# Patient Record
Sex: MALE | Race: WHITE | NOT HISPANIC OR LATINO | Employment: FULL TIME | URBAN - METROPOLITAN AREA
[De-identification: names, ages, dates, MRNs, and addresses within clinical notes are randomized per-mention and may not be internally consistent; named-entity substitution may affect disease eponyms.]

---

## 2023-11-19 ENCOUNTER — APPOINTMENT (OUTPATIENT)
Dept: ULTRASOUND IMAGING | Facility: HOSPITAL | Age: 43
DRG: 439 | End: 2023-11-19
Payer: COMMERCIAL

## 2023-11-19 ENCOUNTER — APPOINTMENT (OUTPATIENT)
Dept: CT IMAGING | Facility: HOSPITAL | Age: 43
DRG: 439 | End: 2023-11-19
Payer: COMMERCIAL

## 2023-11-19 ENCOUNTER — HOSPITAL ENCOUNTER (INPATIENT)
Facility: HOSPITAL | Age: 43
LOS: 5 days | Discharge: HOME | DRG: 439 | End: 2023-11-24
Attending: EMERGENCY MEDICINE | Admitting: INTERNAL MEDICINE
Payer: COMMERCIAL

## 2023-11-19 DIAGNOSIS — E87.20 LACTIC ACIDOSIS: ICD-10-CM

## 2023-11-19 DIAGNOSIS — K85.90 ACUTE PANCREATITIS, UNSPECIFIED COMPLICATION STATUS, UNSPECIFIED PANCREATITIS TYPE: Primary | ICD-10-CM

## 2023-11-19 DIAGNOSIS — N17.9 ACUTE KIDNEY INJURY: ICD-10-CM

## 2023-11-19 LAB
ALBUMIN SERPL-MCNC: 6.9 G/DL (ref 3.5–5.2)
ALBUMIN/GLOB SERPL: 2.1 G/DL
ALP SERPL-CCNC: 591 U/L (ref 39–117)
ALT SERPL W P-5'-P-CCNC: 591 U/L (ref 1–41)
AMPHET+METHAMPHET UR QL: NEGATIVE
ANION GAP SERPL CALCULATED.3IONS-SCNC: 23 MMOL/L (ref 5–15)
AST SERPL-CCNC: 198 U/L (ref 1–40)
BACTERIA UR QL AUTO: NORMAL /HPF
BARBITURATES UR QL SCN: NEGATIVE
BENZODIAZ UR QL SCN: NEGATIVE
BILIRUB SERPL-MCNC: 2.1 MG/DL (ref 0–1.2)
BILIRUB UR QL STRIP: NEGATIVE
BUN SERPL-MCNC: 21 MG/DL (ref 6–20)
BUN/CREAT SERPL: 8.3 (ref 7–25)
CALCIUM SPEC-SCNC: 12.4 MG/DL (ref 8.6–10.5)
CANNABINOIDS SERPL QL: NEGATIVE
CHLORIDE SERPL-SCNC: 97 MMOL/L (ref 98–107)
CLARITY UR: CLEAR
CO2 SERPL-SCNC: 24 MMOL/L (ref 22–29)
COCAINE UR QL: POSITIVE
COLOR UR: YELLOW
CREAT SERPL-MCNC: 2.53 MG/DL (ref 0.76–1.27)
D-LACTATE SERPL-SCNC: 1.4 MMOL/L (ref 0.5–2)
D-LACTATE SERPL-SCNC: 5.2 MMOL/L (ref 0.3–2)
DEPRECATED RDW RBC AUTO: 41.6 FL (ref 37–54)
EGFRCR SERPLBLD CKD-EPI 2021: 31.6 ML/MIN/1.73
ERYTHROCYTE [DISTWIDTH] IN BLOOD BY AUTOMATED COUNT: 13.4 % (ref 12.3–15.4)
ETHANOL UR QL: <0.01 %
GLOBULIN UR ELPH-MCNC: 3.3 GM/DL
GLUCOSE SERPL-MCNC: 192 MG/DL (ref 65–99)
GLUCOSE UR STRIP-MCNC: NEGATIVE MG/DL
HCT VFR BLD AUTO: 60.7 % (ref 37.5–51)
HGB BLD-MCNC: 20.4 G/DL (ref 13–17.7)
HGB UR QL STRIP.AUTO: NEGATIVE
HYALINE CASTS UR QL AUTO: NORMAL /LPF
KETONES UR QL STRIP: ABNORMAL
LARGE PLATELETS: ABNORMAL
LEUKOCYTE ESTERASE UR QL STRIP.AUTO: NEGATIVE
LIPASE SERPL-CCNC: 1786 U/L (ref 13–60)
LYMPHOCYTES # BLD MANUAL: 0.64 10*3/MM3 (ref 0.7–3.1)
LYMPHOCYTES NFR BLD MANUAL: 5 % (ref 5–12)
MCH RBC QN AUTO: 28.1 PG (ref 26.6–33)
MCHC RBC AUTO-ENTMCNC: 33.6 G/DL (ref 31.5–35.7)
MCV RBC AUTO: 83.7 FL (ref 79–97)
METHADONE UR QL SCN: NEGATIVE
MONOCYTES # BLD: 1.07 10*3/MM3 (ref 0.1–0.9)
NEUTROPHILS # BLD AUTO: 19.69 10*3/MM3 (ref 1.7–7)
NEUTROPHILS NFR BLD MANUAL: 83 % (ref 42.7–76)
NEUTS BAND NFR BLD MANUAL: 9 % (ref 0–5)
NEUTS VAC BLD QL SMEAR: ABNORMAL
NITRITE UR QL STRIP: NEGATIVE
OPIATES UR QL: POSITIVE
OXYCODONE UR QL SCN: NEGATIVE
PH UR STRIP.AUTO: <=5 [PH] (ref 5–8)
PLATELET # BLD AUTO: 415 10*3/MM3 (ref 140–450)
PMV BLD AUTO: 9.4 FL (ref 6–12)
POTASSIUM SERPL-SCNC: 4.3 MMOL/L (ref 3.5–5.2)
PROT SERPL-MCNC: 10.2 G/DL (ref 6–8.5)
PROT UR QL STRIP: ABNORMAL
RBC # BLD AUTO: 7.25 10*6/MM3 (ref 4.14–5.8)
RBC # UR STRIP: NORMAL /HPF
RBC MORPH BLD: NORMAL
REF LAB TEST METHOD: NORMAL
SCAN SLIDE: NORMAL
SODIUM SERPL-SCNC: 144 MMOL/L (ref 136–145)
SP GR UR STRIP: 1.07 (ref 1–1.03)
SQUAMOUS #/AREA URNS HPF: NORMAL /HPF
UROBILINOGEN UR QL STRIP: ABNORMAL
VARIANT LYMPHS NFR BLD MANUAL: 3 % (ref 19.6–45.3)
WBC # UR STRIP: NORMAL /HPF
WBC NRBC COR # BLD AUTO: 21.4 10*3/MM3 (ref 3.4–10.8)

## 2023-11-19 PROCEDURE — 74177 CT ABD & PELVIS W/CONTRAST: CPT

## 2023-11-19 PROCEDURE — 85025 COMPLETE CBC W/AUTO DIFF WBC: CPT | Performed by: EMERGENCY MEDICINE

## 2023-11-19 PROCEDURE — 83605 ASSAY OF LACTIC ACID: CPT

## 2023-11-19 PROCEDURE — 36415 COLL VENOUS BLD VENIPUNCTURE: CPT

## 2023-11-19 PROCEDURE — 83690 ASSAY OF LIPASE: CPT | Performed by: EMERGENCY MEDICINE

## 2023-11-19 PROCEDURE — 80307 DRUG TEST PRSMV CHEM ANLYZR: CPT | Performed by: EMERGENCY MEDICINE

## 2023-11-19 PROCEDURE — 25810000003 LACTATED RINGERS SOLUTION: Performed by: EMERGENCY MEDICINE

## 2023-11-19 PROCEDURE — 99285 EMERGENCY DEPT VISIT HI MDM: CPT

## 2023-11-19 PROCEDURE — 80053 COMPREHEN METABOLIC PANEL: CPT | Performed by: EMERGENCY MEDICINE

## 2023-11-19 PROCEDURE — 76705 ECHO EXAM OF ABDOMEN: CPT

## 2023-11-19 PROCEDURE — 25010000002 MORPHINE PER 10 MG: Performed by: EMERGENCY MEDICINE

## 2023-11-19 PROCEDURE — 25010000002 HYDROMORPHONE 1 MG/ML SOLUTION: Performed by: INTERNAL MEDICINE

## 2023-11-19 PROCEDURE — 25810000003 SODIUM CHLORIDE 0.9 % SOLUTION: Performed by: EMERGENCY MEDICINE

## 2023-11-19 PROCEDURE — 25510000001 IOPAMIDOL PER 1 ML: Performed by: EMERGENCY MEDICINE

## 2023-11-19 PROCEDURE — 82077 ASSAY SPEC XCP UR&BREATH IA: CPT | Performed by: EMERGENCY MEDICINE

## 2023-11-19 PROCEDURE — 81001 URINALYSIS AUTO W/SCOPE: CPT | Performed by: EMERGENCY MEDICINE

## 2023-11-19 PROCEDURE — 25010000002 ENOXAPARIN PER 10 MG: Performed by: INTERNAL MEDICINE

## 2023-11-19 PROCEDURE — 25810000003 SODIUM CHLORIDE 0.9 % SOLUTION: Performed by: INTERNAL MEDICINE

## 2023-11-19 PROCEDURE — 25810000003 LACTATED RINGERS PER 1000 ML: Performed by: INTERNAL MEDICINE

## 2023-11-19 PROCEDURE — 25010000002 PIPERACILLIN SOD-TAZOBACTAM PER 1 G: Performed by: EMERGENCY MEDICINE

## 2023-11-19 PROCEDURE — 25010000002 ONDANSETRON PER 1 MG: Performed by: EMERGENCY MEDICINE

## 2023-11-19 PROCEDURE — 25010000002 HYDROMORPHONE 1 MG/ML SOLUTION: Performed by: EMERGENCY MEDICINE

## 2023-11-19 PROCEDURE — 85007 BL SMEAR W/DIFF WBC COUNT: CPT | Performed by: EMERGENCY MEDICINE

## 2023-11-19 PROCEDURE — 87040 BLOOD CULTURE FOR BACTERIA: CPT | Performed by: EMERGENCY MEDICINE

## 2023-11-19 PROCEDURE — 25010000002 ONDANSETRON PER 1 MG: Performed by: INTERNAL MEDICINE

## 2023-11-19 RX ORDER — ACETAMINOPHEN 650 MG/1
650 SUPPOSITORY RECTAL EVERY 4 HOURS PRN
Status: DISCONTINUED | OUTPATIENT
Start: 2023-11-19 | End: 2023-11-24 | Stop reason: HOSPADM

## 2023-11-19 RX ORDER — POLYETHYLENE GLYCOL 3350 17 G/17G
17 POWDER, FOR SOLUTION ORAL DAILY PRN
Status: DISCONTINUED | OUTPATIENT
Start: 2023-11-19 | End: 2023-11-24 | Stop reason: HOSPADM

## 2023-11-19 RX ORDER — ONDANSETRON 2 MG/ML
4 INJECTION INTRAMUSCULAR; INTRAVENOUS ONCE
Status: COMPLETED | OUTPATIENT
Start: 2023-11-19 | End: 2023-11-19

## 2023-11-19 RX ORDER — ONDANSETRON 4 MG/1
4 TABLET, FILM COATED ORAL EVERY 6 HOURS PRN
Status: DISCONTINUED | OUTPATIENT
Start: 2023-11-19 | End: 2023-11-24 | Stop reason: HOSPADM

## 2023-11-19 RX ORDER — PANTOPRAZOLE SODIUM 40 MG/10ML
40 INJECTION, POWDER, LYOPHILIZED, FOR SOLUTION INTRAVENOUS
Status: DISCONTINUED | OUTPATIENT
Start: 2023-11-19 | End: 2023-11-22

## 2023-11-19 RX ORDER — BISACODYL 5 MG/1
5 TABLET, DELAYED RELEASE ORAL DAILY PRN
Status: DISCONTINUED | OUTPATIENT
Start: 2023-11-19 | End: 2023-11-24 | Stop reason: HOSPADM

## 2023-11-19 RX ORDER — SODIUM CHLORIDE 9 MG/ML
100 INJECTION, SOLUTION INTRAVENOUS CONTINUOUS
Status: DISCONTINUED | OUTPATIENT
Start: 2023-11-19 | End: 2023-11-19

## 2023-11-19 RX ORDER — ACETAMINOPHEN 325 MG/1
650 TABLET ORAL EVERY 4 HOURS PRN
Status: DISCONTINUED | OUTPATIENT
Start: 2023-11-19 | End: 2023-11-24 | Stop reason: HOSPADM

## 2023-11-19 RX ORDER — AMOXICILLIN 250 MG
2 CAPSULE ORAL 2 TIMES DAILY
Status: DISCONTINUED | OUTPATIENT
Start: 2023-11-19 | End: 2023-11-24 | Stop reason: HOSPADM

## 2023-11-19 RX ORDER — SODIUM CHLORIDE, SODIUM LACTATE, POTASSIUM CHLORIDE, CALCIUM CHLORIDE 600; 310; 30; 20 MG/100ML; MG/100ML; MG/100ML; MG/100ML
100 INJECTION, SOLUTION INTRAVENOUS CONTINUOUS
Status: DISCONTINUED | OUTPATIENT
Start: 2023-11-19 | End: 2023-11-24 | Stop reason: HOSPADM

## 2023-11-19 RX ORDER — SODIUM CHLORIDE 0.9 % (FLUSH) 0.9 %
10 SYRINGE (ML) INJECTION AS NEEDED
Status: DISCONTINUED | OUTPATIENT
Start: 2023-11-19 | End: 2023-11-24 | Stop reason: HOSPADM

## 2023-11-19 RX ORDER — ALUMINA, MAGNESIA, AND SIMETHICONE 2400; 2400; 240 MG/30ML; MG/30ML; MG/30ML
15 SUSPENSION ORAL EVERY 6 HOURS PRN
Status: DISCONTINUED | OUTPATIENT
Start: 2023-11-19 | End: 2023-11-24 | Stop reason: HOSPADM

## 2023-11-19 RX ORDER — ACETAMINOPHEN 160 MG/5ML
650 SOLUTION ORAL EVERY 4 HOURS PRN
Status: DISCONTINUED | OUTPATIENT
Start: 2023-11-19 | End: 2023-11-24 | Stop reason: HOSPADM

## 2023-11-19 RX ORDER — CHOLECALCIFEROL (VITAMIN D3) 125 MCG
5 CAPSULE ORAL NIGHTLY PRN
Status: DISCONTINUED | OUTPATIENT
Start: 2023-11-19 | End: 2023-11-24 | Stop reason: HOSPADM

## 2023-11-19 RX ORDER — CHLORPROMAZINE HYDROCHLORIDE 25 MG/ML
25 INJECTION INTRAMUSCULAR ONCE
Status: COMPLETED | OUTPATIENT
Start: 2023-11-19 | End: 2023-11-20

## 2023-11-19 RX ORDER — OXYCODONE HYDROCHLORIDE 5 MG/1
10 TABLET ORAL EVERY 4 HOURS PRN
Status: DISCONTINUED | OUTPATIENT
Start: 2023-11-19 | End: 2023-11-24 | Stop reason: HOSPADM

## 2023-11-19 RX ORDER — ONDANSETRON 2 MG/ML
4 INJECTION INTRAMUSCULAR; INTRAVENOUS EVERY 6 HOURS PRN
Status: DISCONTINUED | OUTPATIENT
Start: 2023-11-19 | End: 2023-11-24 | Stop reason: HOSPADM

## 2023-11-19 RX ORDER — ENOXAPARIN SODIUM 100 MG/ML
40 INJECTION SUBCUTANEOUS DAILY
Status: DISCONTINUED | OUTPATIENT
Start: 2023-11-19 | End: 2023-11-24 | Stop reason: HOSPADM

## 2023-11-19 RX ORDER — BISACODYL 10 MG
10 SUPPOSITORY, RECTAL RECTAL DAILY PRN
Status: DISCONTINUED | OUTPATIENT
Start: 2023-11-19 | End: 2023-11-24 | Stop reason: HOSPADM

## 2023-11-19 RX ADMIN — HYDROMORPHONE HYDROCHLORIDE 1 MG: 1 INJECTION, SOLUTION INTRAMUSCULAR; INTRAVENOUS; SUBCUTANEOUS at 15:55

## 2023-11-19 RX ADMIN — PIPERACILLIN AND TAZOBACTAM 3.38 G: 3; .375 INJECTION, POWDER, FOR SOLUTION INTRAVENOUS at 10:34

## 2023-11-19 RX ADMIN — MORPHINE SULFATE 4 MG: 4 INJECTION, SOLUTION INTRAMUSCULAR; INTRAVENOUS at 08:47

## 2023-11-19 RX ADMIN — SODIUM CHLORIDE 100 ML/HR: 9 INJECTION, SOLUTION INTRAVENOUS at 15:48

## 2023-11-19 RX ADMIN — HYDROMORPHONE HYDROCHLORIDE 1 MG: 1 INJECTION, SOLUTION INTRAMUSCULAR; INTRAVENOUS; SUBCUTANEOUS at 20:29

## 2023-11-19 RX ADMIN — SODIUM CHLORIDE 2040 ML: 9 INJECTION, SOLUTION INTRAVENOUS at 09:31

## 2023-11-19 RX ADMIN — ONDANSETRON 4 MG: 2 INJECTION INTRAMUSCULAR; INTRAVENOUS at 08:47

## 2023-11-19 RX ADMIN — PANTOPRAZOLE SODIUM 40 MG: 40 INJECTION, POWDER, FOR SOLUTION INTRAVENOUS at 18:39

## 2023-11-19 RX ADMIN — SODIUM CHLORIDE, POTASSIUM CHLORIDE, SODIUM LACTATE AND CALCIUM CHLORIDE 200 ML/HR: 600; 310; 30; 20 INJECTION, SOLUTION INTRAVENOUS at 21:21

## 2023-11-19 RX ADMIN — HYDROMORPHONE HYDROCHLORIDE 1 MG: 1 INJECTION, SOLUTION INTRAMUSCULAR; INTRAVENOUS; SUBCUTANEOUS at 12:36

## 2023-11-19 RX ADMIN — ACETAMINOPHEN 650 MG: 325 TABLET, FILM COATED ORAL at 20:29

## 2023-11-19 RX ADMIN — SODIUM CHLORIDE, POTASSIUM CHLORIDE, SODIUM LACTATE AND CALCIUM CHLORIDE 1000 ML: 600; 310; 30; 20 INJECTION, SOLUTION INTRAVENOUS at 08:47

## 2023-11-19 RX ADMIN — IOPAMIDOL 100 ML: 755 INJECTION, SOLUTION INTRAVENOUS at 09:43

## 2023-11-19 RX ADMIN — ENOXAPARIN SODIUM 40 MG: 100 INJECTION SUBCUTANEOUS at 18:39

## 2023-11-19 RX ADMIN — ONDANSETRON 4 MG: 2 INJECTION INTRAMUSCULAR; INTRAVENOUS at 15:48

## 2023-11-19 RX ADMIN — HYDROMORPHONE HYDROCHLORIDE 1 MG: 1 INJECTION, SOLUTION INTRAMUSCULAR; INTRAVENOUS; SUBCUTANEOUS at 09:53

## 2023-11-19 NOTE — ED PROVIDER NOTES
"Subjective   History of Present Illness  43-year-old male describes severe epigastric pain and frequent vomiting since last night.  He states he has had this problem on and off for a couple of months.  He reports no unusual ingestions and denies alcohol use.  He states he had felt feverish.  He reports no diarrhea or constipation or melena.  He reports no relieving or exacerbating factors.  He states he had not taken any medications over-the-counter or prescription.  Review of Systems    No past medical history on file.   reports no past medical history  No Known Allergies    No past surgical history on file.    No family history on file.    Social History     Socioeconomic History    Marital status: Single     Prior to Admission medications    Not on File     /87   Pulse 90   Temp 97.7 °F (36.5 °C) (Axillary)   Resp 28   Ht 182.9 cm (72\")   Wt 68 kg (150 lb)   SpO2 96%   BMI 20.34 kg/m²         Objective   Physical Exam  General: Thin male, acute distress secondary to pain, awake and alert  Eyes: sclera nonicteric  HEENT: Mucous membranes moist, no mucosal swelling  Neck: Supple, no nuchal rigidity, no JVD  Respirations: Respirations nonlabored, equal breath sounds bilaterally, clear lungs  Heart regular rate and rhythm, no murmurs rubs or gallops,   Abdomen soft, tender palpation epigastrium, no rebound or guarding, nondistended, no hepatosplenomegaly, no hernia, no mass, normal bowel sounds, no CVA tenderness  Extremities no clubbing cyanosis or edema,   Neuro cranial nerves grossly intact, no focal limb deficits  Psych oriented, pleasant affect  Skin no rash, brisk cap refill  Procedures           ED Course      Results for orders placed or performed during the hospital encounter of 11/19/23   Comprehensive Metabolic Panel    Specimen: Blood   Result Value Ref Range    Glucose 192 (H) 65 - 99 mg/dL    BUN 21 (H) 6 - 20 mg/dL    Creatinine 2.53 (H) 0.76 - 1.27 mg/dL    Sodium 144 136 - 145 mmol/L    " Potassium 4.3 3.5 - 5.2 mmol/L    Chloride 97 (L) 98 - 107 mmol/L    CO2 24.0 22.0 - 29.0 mmol/L    Calcium 12.4 (H) 8.6 - 10.5 mg/dL    Total Protein 10.2 (H) 6.0 - 8.5 g/dL    Albumin 6.9 (H) 3.5 - 5.2 g/dL    ALT (SGPT) 591 (H) 1 - 41 U/L    AST (SGOT) 198 (H) 1 - 40 U/L    Alkaline Phosphatase 591 (H) 39 - 117 U/L    Total Bilirubin 2.1 (H) 0.0 - 1.2 mg/dL    Globulin 3.3 gm/dL    A/G Ratio 2.1 g/dL    BUN/Creatinine Ratio 8.3 7.0 - 25.0    Anion Gap 23.0 (H) 5.0 - 15.0 mmol/L    eGFR 31.6 (L) >60.0 mL/min/1.73   Lipase    Specimen: Blood   Result Value Ref Range    Lipase 1,786 (H) 13 - 60 U/L   CBC Auto Differential    Specimen: Blood   Result Value Ref Range    WBC 21.40 (H) 3.40 - 10.80 10*3/mm3    RBC 7.25 (H) 4.14 - 5.80 10*6/mm3    Hemoglobin 20.4 (H) 13.0 - 17.7 g/dL    Hematocrit 60.7 (H) 37.5 - 51.0 %    MCV 83.7 79.0 - 97.0 fL    MCH 28.1 26.6 - 33.0 pg    MCHC 33.6 31.5 - 35.7 g/dL    RDW 13.4 12.3 - 15.4 %    RDW-SD 41.6 37.0 - 54.0 fl    MPV 9.4 6.0 - 12.0 fL    Platelets 415 140 - 450 10*3/mm3   Scan Slide    Specimen: Blood   Result Value Ref Range    Scan Slide     Manual Differential    Specimen: Blood   Result Value Ref Range    Neutrophil % 83.0 (H) 42.7 - 76.0 %    Lymphocyte % 3.0 (L) 19.6 - 45.3 %    Monocyte % 5.0 5.0 - 12.0 %    Bands %  9.0 (H) 0.0 - 5.0 %    Neutrophils Absolute 19.69 (H) 1.70 - 7.00 10*3/mm3    Lymphocytes Absolute 0.64 (L) 0.70 - 3.10 10*3/mm3    Monocytes Absolute 1.07 (H) 0.10 - 0.90 10*3/mm3    RBC Morphology Normal Normal    Vacuolated Neutrophils Slight/1+ None Seen    Large Platelets Slight/1+ None Seen   POC Lactate    Specimen: Blood   Result Value Ref Range    Lactate 5.2 (C) 0.3 - 2.0 mmol/L     CT Abdomen Pelvis With Contrast    Result Date: 11/19/2023  Impression: Acute uncomplicated pancreatitis. Electronically Signed: Aristeo Byers MD  11/19/2023 10:00 AM EST  Workstation ID: [id number]                                        Medical Decision  Making  Patient presented with upper abdominal pain differential diagnosis including perforation, peritonitis, pancreatitis, cholecystitis, bowel obstruction, ischemic bowel    Patient was ordered IV fluids 30 cc/kg as he does have some lactic acidosis.  Notably he is afebrile.  Blood cultures ordered and pending.  He was ordered IV Zosyn.  CT scan showing pancreatitis.  Laboratory evaluation showing elevated lipase as well as some acute kidney injury and CBC suggestive of hemoconcentration likely secondary to his dehydration.  Patient was ordered morphine and then Dilaudid for acute pain management.  He had some marginal improvement in discomfort.  He was advised of findings and agreeable plan of admission for further treatment and evaluation.  Hospitalist service was paged for admission.  Case and findings discussed with Dr. Gutierrez with the hospitalist service for admission    Problems Addressed:  Acute kidney injury: complicated acute illness or injury  Acute pancreatitis, unspecified complication status, unspecified pancreatitis type: complicated acute illness or injury  Lactic acidosis: complicated acute illness or injury    Amount and/or Complexity of Data Reviewed  Labs: ordered. Decision-making details documented in ED Course.  Radiology: ordered and independent interpretation performed.     Details: My independent interpretation of CT abdomen image inflammatory changes around the pancreas, no apparent bowel obstruction or free air    Risk  Prescription drug management.  Decision regarding hospitalization.        Final diagnoses:   Acute pancreatitis, unspecified complication status, unspecified pancreatitis type   Acute kidney injury   Lactic acidosis       ED Disposition  ED Disposition       ED Disposition   Decision to Admit    Condition   --    Comment   Level of Care: Progressive Care [20]   Admitting Physician: MONAE FERRER [[id number]]   Attending Physician: MONAE FERRER [[id number]]                 No  follow-up provider specified.       Medication List      No changes were made to your prescriptions during this visit.            Murali Belle MD  11/19/23 1031       Murali Belle MD  11/19/23 3821

## 2023-11-19 NOTE — H&P
[facility] Medicine Services  History & Physical    Patient Name: Dayron Manley  : 1980  MRN: [id number]  Primary Care Physician:  Provider, No Known  Date of admission: 2023  Date of Service: 23    Subjective      Chief Complaint: Abdominal pain    History of Present Illness: Dayron Manley is a 43 y.o. male who presented to [facility] on 2023 complaining of abdominal pain nausea and vomiting.  Patient gives history of abdominal pain starting yesterday at 6 PM.  Moderate to severe abdominal pain.  Pain in the epigastric region.  Associated with nausea and vomiting.  He was unable to keep food.  No diarrhea.  No rectal bleeding.  No melena.  No hematemesis.  Patient drinks alcohol however his last drink was approximately 2 weeks back.  Patient is history abdominal pain for many months.  No dysuria.  No flank pain.  Patient denies any significant past medical history.  He does not see a primary care physician on a regular basis.  Patient called his mother who brought him to [facility] emergency room.  Currently patient has epigastric pain.  No anterior chest pain.  No shortness of breath.  No dyspnea on exertion.  No weakness or numbness of the limbs.      ROS A 12 point review of system was done and was negative except as mentioned above    Personal History     No past medical history on file.    No past surgical history on file.    Family History: family history is not on file. Otherwise pertinent FHx was reviewed and not pertinent to current issue.    Social History:      Home Medications:  Prior to Admission Medications       None              Allergies:  No Known Allergies    Objective      Vitals:   Temp:  [97.7 °F (36.5 °C)] 97.7 °F (36.5 °C)  Heart Rate:  [] 73  Resp:  [28] 28  BP: (125-162)/() 161/83    Physical Exam  Constitutional:       Appearance: Normal appearance.   HENT:      Head: Normocephalic and atraumatic.      Nose: Nose  normal.   Cardiovascular:      Rate and Rhythm: Normal rate.      Heart sounds: Normal heart sounds.   Pulmonary:      Effort: Pulmonary effort is normal. No respiratory distress.      Breath sounds: Normal breath sounds. No stridor. No wheezing, rhonchi or rales.   Chest:      Chest wall: No tenderness.   Abdominal:      General: Abdomen is flat. There is no distension.      Palpations: There is no mass.      Tenderness: There is abdominal tenderness. There is no right CVA tenderness, left CVA tenderness, guarding or rebound.      Comments: Epigastrium and is present.  No rebound or guarding bowel sounds are present.   Musculoskeletal:      Cervical back: Normal range of motion.   Skin:     General: Skin is warm and dry.   Neurological:      General: No focal deficit present.      Mental Status: He is alert.   Psychiatric:         Mood and Affect: Mood normal.         Behavior: Behavior normal.          Result Review    Result Review:  I have personally reviewed the results from the time of this admission to 11/19/2023 13:10 EST and agree with these findings:  [x]  Laboratory  []  Microbiology  [x]  Radiology  []  EKG/Telemetry   []  Cardiology/Vascular   []  Pathology  []  Old records  []  Other:        Assessment & Plan        Active Hospital Problems:  There are no active hospital problems to display for this patient.    Plan:   Abdominal pain  Nausea and vomiting  Acute pancreatitis  Acute kidney injury  Dehydration  Erythrocytosis.    Is a 41-year-old male with no significant past medical history came with abdominal pain.  He gets intermittent episodes of abdominal pain for the last few months.  Abdominal pain became worse yesterday evening.  Was found to have elevated lipase.  CT scan report was reviewed which showed acute uncomplicated pancreatitis.  We will follow-up his electrolytes.  He has elevated creatinine.  We will continue IV fluids.  Zofran as needed.  IV analgesics.  GI to see.  We will do an  ultrasound of the gallbladder.  Abstinence from alcohol.  Patient has elevated hemoglobin.  Questionable secondary to hemoconcentration.  IV fluids.  Follow-up CBC.  Follow clinically.      CODE STATUS: Full code     Admission Status:  I believe this patient meets inpatient status    I discussed the patient's findings and my recommendations with the patient and mother.        Signature: Electronically signed by Dianne Yoo MD, 11/19/23, 13:10 EST.  [facility] Hospitalist Team

## 2023-11-19 NOTE — Clinical Note
Level of Care: Progressive Care [20]   Admitting Physician: MONAE FERRER [[id number]]   Attending Physician: MONAE FERRER [[id number]]

## 2023-11-20 ENCOUNTER — INPATIENT HOSPITAL (OUTPATIENT)
Dept: URBAN - METROPOLITAN AREA HOSPITAL 84 | Facility: HOSPITAL | Age: 43
End: 2023-11-20

## 2023-11-20 DIAGNOSIS — F10.20 ALCOHOL DEPENDENCE, UNCOMPLICATED: ICD-10-CM

## 2023-11-20 DIAGNOSIS — R89.2: ICD-10-CM

## 2023-11-20 DIAGNOSIS — K85.90 ACUTE PANCREATITIS WITHOUT NECROSIS OR INFECTION, UNSPECIFIE: ICD-10-CM

## 2023-11-20 DIAGNOSIS — R94.5 ABNORMAL RESULTS OF LIVER FUNCTION STUDIES: ICD-10-CM

## 2023-11-20 LAB
ALBUMIN SERPL-MCNC: 3.9 G/DL (ref 3.5–5.2)
ALBUMIN/GLOB SERPL: 1.6 G/DL
ALP SERPL-CCNC: 302 U/L (ref 39–117)
ALT SERPL W P-5'-P-CCNC: 268 U/L (ref 1–41)
ANION GAP SERPL CALCULATED.3IONS-SCNC: 11 MMOL/L (ref 5–15)
AST SERPL-CCNC: 72 U/L (ref 1–40)
BASOPHILS # BLD AUTO: 0.1 10*3/MM3 (ref 0–0.2)
BASOPHILS NFR BLD AUTO: 0.3 % (ref 0–1.5)
BILIRUB SERPL-MCNC: 1.3 MG/DL (ref 0–1.2)
BUN SERPL-MCNC: 18 MG/DL (ref 6–20)
BUN/CREAT SERPL: 18.2 (ref 7–25)
CALCIUM SPEC-SCNC: 9.4 MG/DL (ref 8.6–10.5)
CHLORIDE SERPL-SCNC: 109 MMOL/L (ref 98–107)
CHOLEST SERPL-MCNC: 137 MG/DL (ref 0–200)
CO2 SERPL-SCNC: 24 MMOL/L (ref 22–29)
CREAT SERPL-MCNC: 0.99 MG/DL (ref 0.76–1.27)
DEPRECATED RDW RBC AUTO: 41.6 FL (ref 37–54)
EGFRCR SERPLBLD CKD-EPI 2021: 97.5 ML/MIN/1.73
EOSINOPHIL # BLD AUTO: 0 10*3/MM3 (ref 0–0.4)
EOSINOPHIL NFR BLD AUTO: 0 % (ref 0.3–6.2)
ERYTHROCYTE [DISTWIDTH] IN BLOOD BY AUTOMATED COUNT: 13.6 % (ref 12.3–15.4)
GLOBULIN UR ELPH-MCNC: 2.4 GM/DL
GLUCOSE SERPL-MCNC: 112 MG/DL (ref 65–99)
HAV IGM SERPL QL IA: NORMAL
HBV CORE IGM SERPL QL IA: NORMAL
HBV SURFACE AG SERPL QL IA: NORMAL
HCT VFR BLD AUTO: 48.2 % (ref 37.5–51)
HCV AB SER DONR QL: NORMAL
HDLC SERPL-MCNC: 42 MG/DL (ref 40–60)
HGB BLD-MCNC: 15.5 G/DL (ref 13–17.7)
LDLC SERPL CALC-MCNC: 75 MG/DL (ref 0–100)
LDLC/HDLC SERPL: 1.73 {RATIO}
LIPASE SERPL-CCNC: 821 U/L (ref 13–60)
LYMPHOCYTES # BLD AUTO: 1 10*3/MM3 (ref 0.7–3.1)
LYMPHOCYTES NFR BLD AUTO: 5.6 % (ref 19.6–45.3)
MCH RBC QN AUTO: 27.9 PG (ref 26.6–33)
MCHC RBC AUTO-ENTMCNC: 32.2 G/DL (ref 31.5–35.7)
MCV RBC AUTO: 86.7 FL (ref 79–97)
MONOCYTES # BLD AUTO: 1 10*3/MM3 (ref 0.1–0.9)
MONOCYTES NFR BLD AUTO: 5.7 % (ref 5–12)
NEUTROPHILS NFR BLD AUTO: 15.4 10*3/MM3 (ref 1.7–7)
NEUTROPHILS NFR BLD AUTO: 88.4 % (ref 42.7–76)
NRBC BLD AUTO-RTO: 0 /100 WBC (ref 0–0.2)
PLATELET # BLD AUTO: 257 10*3/MM3 (ref 140–450)
PMV BLD AUTO: 9.1 FL (ref 6–12)
POTASSIUM SERPL-SCNC: 4.2 MMOL/L (ref 3.5–5.2)
PROT SERPL-MCNC: 6.3 G/DL (ref 6–8.5)
RBC # BLD AUTO: 5.57 10*6/MM3 (ref 4.14–5.8)
SODIUM SERPL-SCNC: 144 MMOL/L (ref 136–145)
TRIGL SERPL-MCNC: 111 MG/DL (ref 0–150)
VLDLC SERPL-MCNC: 20 MG/DL (ref 5–40)
WBC NRBC COR # BLD AUTO: 17.4 10*3/MM3 (ref 3.4–10.8)

## 2023-11-20 PROCEDURE — 86140 C-REACTIVE PROTEIN: CPT | Performed by: NURSE PRACTITIONER

## 2023-11-20 PROCEDURE — 80053 COMPREHEN METABOLIC PANEL: CPT | Performed by: INTERNAL MEDICINE

## 2023-11-20 PROCEDURE — 25810000003 LACTATED RINGERS PER 1000 ML: Performed by: INTERNAL MEDICINE

## 2023-11-20 PROCEDURE — 25010000002 ONDANSETRON PER 1 MG: Performed by: INTERNAL MEDICINE

## 2023-11-20 PROCEDURE — 83690 ASSAY OF LIPASE: CPT | Performed by: NURSE PRACTITIONER

## 2023-11-20 PROCEDURE — 85025 COMPLETE CBC W/AUTO DIFF WBC: CPT | Performed by: NURSE PRACTITIONER

## 2023-11-20 PROCEDURE — 83690 ASSAY OF LIPASE: CPT | Performed by: INTERNAL MEDICINE

## 2023-11-20 PROCEDURE — 80053 COMPREHEN METABOLIC PANEL: CPT | Performed by: NURSE PRACTITIONER

## 2023-11-20 PROCEDURE — 25010000002 CHLORPROMAZINE PER 50 MG: Performed by: HOSPITALIST

## 2023-11-20 PROCEDURE — 25010000002 HYDROMORPHONE 1 MG/ML SOLUTION: Performed by: INTERNAL MEDICINE

## 2023-11-20 PROCEDURE — 99222 1ST HOSP IP/OBS MODERATE 55: CPT | Performed by: NURSE PRACTITIONER

## 2023-11-20 PROCEDURE — 36415 COLL VENOUS BLD VENIPUNCTURE: CPT | Performed by: INTERNAL MEDICINE

## 2023-11-20 PROCEDURE — 80061 LIPID PANEL: CPT | Performed by: INTERNAL MEDICINE

## 2023-11-20 PROCEDURE — 80074 ACUTE HEPATITIS PANEL: CPT | Performed by: NURSE PRACTITIONER

## 2023-11-20 PROCEDURE — 85025 COMPLETE CBC W/AUTO DIFF WBC: CPT | Performed by: INTERNAL MEDICINE

## 2023-11-20 RX ADMIN — PANTOPRAZOLE SODIUM 40 MG: 40 INJECTION, POWDER, FOR SOLUTION INTRAVENOUS at 05:46

## 2023-11-20 RX ADMIN — ONDANSETRON 4 MG: 2 INJECTION INTRAMUSCULAR; INTRAVENOUS at 19:08

## 2023-11-20 RX ADMIN — SODIUM CHLORIDE, POTASSIUM CHLORIDE, SODIUM LACTATE AND CALCIUM CHLORIDE 200 ML/HR: 600; 310; 30; 20 INJECTION, SOLUTION INTRAVENOUS at 09:02

## 2023-11-20 RX ADMIN — HYDROMORPHONE HYDROCHLORIDE 1 MG: 1 INJECTION, SOLUTION INTRAMUSCULAR; INTRAVENOUS; SUBCUTANEOUS at 00:23

## 2023-11-20 RX ADMIN — ONDANSETRON 4 MG: 2 INJECTION INTRAMUSCULAR; INTRAVENOUS at 01:57

## 2023-11-20 RX ADMIN — CHLORPROMAZINE HYDROCHLORIDE 25 MG: 25 INJECTION INTRAMUSCULAR at 02:10

## 2023-11-20 RX ADMIN — OXYCODONE 10 MG: 5 TABLET ORAL at 08:50

## 2023-11-20 RX ADMIN — HYDROMORPHONE HYDROCHLORIDE 1 MG: 1 INJECTION, SOLUTION INTRAMUSCULAR; INTRAVENOUS; SUBCUTANEOUS at 14:56

## 2023-11-20 RX ADMIN — HYDROMORPHONE HYDROCHLORIDE 1 MG: 1 INJECTION, SOLUTION INTRAMUSCULAR; INTRAVENOUS; SUBCUTANEOUS at 05:46

## 2023-11-20 RX ADMIN — OXYCODONE 10 MG: 5 TABLET ORAL at 17:47

## 2023-11-20 RX ADMIN — SODIUM CHLORIDE, POTASSIUM CHLORIDE, SODIUM LACTATE AND CALCIUM CHLORIDE 200 ML/HR: 600; 310; 30; 20 INJECTION, SOLUTION INTRAVENOUS at 17:47

## 2023-11-20 RX ADMIN — HYDROMORPHONE HYDROCHLORIDE 1 MG: 1 INJECTION, SOLUTION INTRAMUSCULAR; INTRAVENOUS; SUBCUTANEOUS at 23:42

## 2023-11-20 RX ADMIN — HYDROMORPHONE HYDROCHLORIDE 1 MG: 1 INJECTION, SOLUTION INTRAMUSCULAR; INTRAVENOUS; SUBCUTANEOUS at 10:53

## 2023-11-20 RX ADMIN — ONDANSETRON 4 MG: 2 INJECTION INTRAMUSCULAR; INTRAVENOUS at 09:00

## 2023-11-20 RX ADMIN — OXYCODONE 10 MG: 5 TABLET ORAL at 01:54

## 2023-11-20 RX ADMIN — HYDROMORPHONE HYDROCHLORIDE 1 MG: 1 INJECTION, SOLUTION INTRAMUSCULAR; INTRAVENOUS; SUBCUTANEOUS at 19:15

## 2023-11-20 NOTE — CASE MANAGEMENT/SOCIAL WORK
Discharge Planning Assessment   [place]     Patient Name: Dayron Manley  MRN: [id number]  Today's Date: 11/20/2023    Admit Date: 11/19/2023    Plan: Anticipate routine home alone.   Discharge Needs Assessment       Row Name 11/20/23 1301       Living Environment    People in Home alone    Current Living Arrangements home    Potentially Unsafe Housing Conditions none    Primary Care Provided by self    Provides Primary Care For no one    Family Caregiver if Needed parent(s)    Family Caregiver Names Mother- Alieen    Quality of Family Relationships supportive;involved;helpful    Able to Return to Prior Arrangements yes       Resource/Environmental Concerns    Resource/Environmental Concerns none    Transportation Concerns none       Transition Planning    Patient/Family Anticipates Transition to home    Patient/Family Anticipated Services at Transition none    Transportation Anticipated family or friend will provide;car, drives self       Discharge Needs Assessment    Readmission Within the Last 30 Days no previous admission in last 30 days    Equipment Currently Used at Home none    Concerns to be Addressed denies needs/concerns at this time    Anticipated Changes Related to Illness none    Equipment Needed After Discharge none    Provided Post Acute Provider List? N/A                   Discharge Plan       Row Name 11/20/23 4505       Plan    Plan Anticipate routine home alone.    Patient/Family in Agreement with Plan yes    Plan Comments KAYLAH met with patient and motherAileen at bedside to discuss dc planning. Confirmed that patient works full-time. Reported that he does have insurance through his employer but did not have insurance card. Reported that [employer] insurance recently became effective, and he has been employed 90 days now. Reported that he lives alone and is usually independent. Reported no trouble affording medications and declined needs at this time for any DME/HH/PT services. KAYLAH updated UR RN who  notified finance of insurance type. Information added to patient’s account per [facility] [employer]. DC Barriers: NPO, IV fluids, GI following.              Demographic Summary       Row Name 11/20/23 1301       General Information    Admission Type inpatient    Referral Source admission list    Reason for Consult discharge planning    Preferred Language English       Contact Information    Permission Granted to Share Info With                    Functional Status       Row Name 11/20/23 1302       Functional Status    Usual Activity Tolerance good    Current Activity Tolerance moderate       Functional Status, IADL    Medications independent    Meal Preparation independent    Housekeeping independent    Laundry independent    Shopping independent       Employment/    Employment Status employed full-time           Rylee Welch RN     Office Phone: [phone]  Office Cell: [phone]

## 2023-11-20 NOTE — PROGRESS NOTES
[facility] Medicine Services Daily Progress Note    Patient Name: Dayron Manley  : 1980  MRN: [id number]  Primary Care Physician:  Provider, No Known  Date of admission: 2023      Subjective      Chief Complaint:  Abdominal pain     History of Present Illness: Dayron Manley is a 43 y.o. male who presented to [facility] on 2023 complaining of abdominal pain nausea and vomiting.  Patient gives history of abdominal pain starting yesterday at 6 PM.  Moderate to severe abdominal pain.  Pain in the epigastric region.  Associated with nausea and vomiting.  He was unable to keep food.  No diarrhea.  No rectal bleeding.  No melena.  No hematemesis.  Patient drinks alcohol however his last drink was approximately 2 weeks back.  Patient is history abdominal pain for many months.  No dysuria.  No flank pain.  Patient denies any significant past medical history.  He does not see a primary care physician on a regular basis.  Patient called his mother who brought him to [facility] emergency room.  Currently patient has epigastric pain.  No anterior chest pain.  No shortness of breath.  No dyspnea on exertion.  No weakness or numbness of the limbs.     23 patient seen and examined in bed no acute distress, still having significant nausea.  Still NPO.  Still having abdominal pain.  Discussed with RN.  Vital signs stable.    ROS A 12 point review of system was done and was negative except as mentioned above      Objective      Vitals:   Temp:  [97.7 °F (36.5 °C)-99.5 °F (37.5 °C)] 98.6 °F (37 °C)  Heart Rate:  [] 77  Resp:  [17-18] 18  BP: (125-162)/() 143/83    Physical Exam Constitutional:       Appearance: Normal appearance.   HENT:      Head: Normocephalic and atraumatic.      Nose: Nose normal.   Cardiovascular:      Rate and Rhythm: Normal rate.      Heart sounds: Normal heart sounds.   Pulmonary:      Effort: Pulmonary effort is normal. No  "respiratory distress.      Breath sounds: Normal breath sounds. No stridor. No wheezing, rhonchi or rales.   Chest:      Chest wall: No tenderness.   Abdominal:      General: Abdomen is flat. There is no distension.      Palpations: There is no mass.      Tenderness: There is abdominal tenderness. There is no right CVA tenderness, left CVA tenderness, guarding or rebound.      Comments: Epigastrium and is present.  No rebound or guarding bowel sounds are present.   Musculoskeletal:      Cervical back: Normal range of motion.   Skin:     General: Skin is warm and dry.   Neurological:      General: No focal deficit present.      Mental Status: He is alert.   Psychiatric:         Mood and Affect: Mood normal.         Behavior: Behavior normal.        Result Review    Result Review:  I have personally reviewed the results from the time of this admission to 11/20/2023 08:39 EST and agree with these findings:  []  Laboratory  []  Microbiology  []  Radiology  []  EKG/Telemetry   []  Cardiology/Vascular   []  Pathology  []  Old records  []  Other:  Most notable findings include:     CBC:      Lab 11/20/23  0000 11/19/23  0903   WBC 17.40* 21.40*   HEMOGLOBIN 15.5 20.4*   HEMATOCRIT 48.2 60.7*   PLATELETS 257 415   NEUTROS ABS 15.40* 19.69*   LYMPHS ABS 1.00  --    MONOS ABS 1.00*  --    EOS ABS 0.00  --    MCV 86.7 83.7     CMP:        Lab 11/20/23  0000 11/19/23  0903   SODIUM 144 144   POTASSIUM 4.2 4.3   CHLORIDE 109* 97*   CO2 24.0 24.0   ANION GAP 11.0 23.0*   BUN 18 21*   CREATININE 0.99 2.53*   EGFR 97.5 31.6*   GLUCOSE 112* 192*   CALCIUM 9.4 12.4*   TOTAL PROTEIN 6.3 10.2*   ALBUMIN 3.9 6.9*   GLOBULIN 2.4 3.3   ALT (SGPT) 268* 591*   AST (SGOT) 72* 198*   BILIRUBIN 1.3* 2.1*   ALK PHOS 302* 591*   LIPASE 821* 1,786*     No results found for: \"ACANTHNAEG\", \"AFBCX\", \"BPERTUSSISCX\", \"BLOODCX\"  No results found for: \"BCIDPCR\", \"CXREFLEX\", \"CSFCX\", \"CULTURETIS\"  No results found for: \"CULTURES\", \"HSVCX\", \"URCX\"  No " "results found for: \"EYECULTURE\", \"GCCX\", \"HSVCULTURE\", \"LABHSV\"  No results found for: \"LEGIONELLA\", \"MRSACX\", \"MUMPSCX\", \"MYCOPLASCX\"  No results found for: \"NOCARDIACX\", \"STOOLCX\"  No results found for: \"THROATCX\", \"UNSTIMCULT\", \"URINECX\", \"CULTURE\", \"VZVCULTUR\"  No results found for: \"VIRALCULTU\", \"WOUNDCX\"      Assessment & Plan      Brief Patient Summary:  Dayron Manley is a 43 y.o. male who       enoxaparin, 40 mg, Subcutaneous, Daily  pantoprazole, 40 mg, Intravenous, Q AM  senna-docusate sodium, 2 tablet, Oral, BID       lactated ringers, 200 mL/hr, Last Rate: 200 mL/hr (11/19/23 2121)         Active Hospital Problems:  Active Hospital Problems    Diagnosis     **Pancreatitis      Is a 41-year-old male with no significant past medical history came with abdominal pain.  He gets intermittent episodes of abdominal pain for the last few months.  Abdominal pain became worse yesterday evening.  Was found to have elevated lipase.  CT scan report was reviewed which showed acute uncomplicated pancreatitis.  We will follow-up his electrolytes.  He has elevated creatinine.      Abdominal pain  Nausea and vomiting  Acute pancreatitis  - continue IV fluids.    -Zofran as needed.    -IV analgesics.    -GI consult     - ultrasound of the gallbladder. Patchy edema in the pancreatic parenchyma as seen on CT.  2.No evidence of cholelithiasis or acute cholecystitis.  3.Common bile duct dilatation, unchanged from CTA earlier today and likely related to inflammatory changes in the distal common bile duct.   -Abstinence from alcohol.      Acute kidney injury  Dehydration  Erythrocytosis.  -Patient has elevated hemoglobin.  Questionable secondary to hemoconcentration.    -IV fluids.  Follow-up CBC.    Follow clinically.       DVT prophylaxis:  Medical DVT prophylaxis orders are present.    CODE STATUS:         Disposition:  I expect patient to be discharged home.    I have utilized all available, immediate resources to obtain, " update, or review the patient's current medications including all prescriptions, over-the-counter products, herbals, cannabis/cannabidiol products, and vitamin/mineral/dietary (nutritional) supplements.         Next of kin or Power of :         Admission Status:  I believe this patient meets admit status.    Hospital Medicine Quality Measures for Heart Failure:  The patient has a history of heart transplant or LVAD. YES    Electronically signed by Luis Daniel Rocha MD, 11/20/23, 08:39 EST.  [facility] Hospitalist Team

## 2023-11-20 NOTE — PLAN OF CARE
Goal Outcome Evaluation:      Pt with vomiting early in the shift treated with zofran and effective. Pt continues with abdominal pain treated with PRN medications, effective at times. Mother at bedside. NPO with fluids infusing. VSS at this time.    Problem: Adult Inpatient Plan of Care  Goal: Patient-Specific Goal (Individualized)  Outcome: Ongoing, Progressing

## 2023-11-20 NOTE — PLAN OF CARE
Problem: Adjustment to Illness (Sepsis/Septic Shock)  Goal: Optimal Coping  Intervention: Optimize Psychosocial Adjustment to Illness  Recent Flowsheet Documentation  Taken 11/19/2023 1530 by Brittany Negrete RN  Family/Support System Care:   self-care encouraged   support provided     Problem: Infection Progression (Sepsis/Septic Shock)  Goal: Absence of Infection Signs and Symptoms  Intervention: Promote Recovery  Recent Flowsheet Documentation  Taken 11/19/2023 1530 by Brittany Negrete RN  Sleep/Rest Enhancement:   awakenings minimized   consistent schedule promoted  Intervention: Promote Stabilization  Recent Flowsheet Documentation  Taken 11/19/2023 1530 by Brittany Negrete RN  Fluid/Electrolyte Management: intravenous fluid replacement initiated  Fever Reduction/Comfort Measures:   lightweight bedding   lightweight clothing     Problem: Adjustment to Illness (Sepsis/Septic Shock)  Goal: Optimal Coping  Outcome: Ongoing, Progressing  Intervention: Optimize Psychosocial Adjustment to Illness  Recent Flowsheet Documentation  Taken 11/19/2023 1530 by Brittany Negrete RN  Family/Support System Care:   self-care encouraged   support provided     Problem: Bleeding (Sepsis/Septic Shock)  Goal: Absence of Bleeding  Outcome: Ongoing, Progressing     Problem: Glycemic Control Impaired (Sepsis/Septic Shock)  Goal: Blood Glucose Level Within Desired Range  Outcome: Ongoing, Progressing     Problem: Infection Progression (Sepsis/Septic Shock)  Goal: Absence of Infection Signs and Symptoms  Outcome: Ongoing, Progressing  Intervention: Promote Recovery  Recent Flowsheet Documentation  Taken 11/19/2023 1530 by Brittany Negrete RN  Sleep/Rest Enhancement:   awakenings minimized   consistent schedule promoted  Intervention: Promote Stabilization  Recent Flowsheet Documentation  Taken 11/19/2023 1530 by Brittany Negrete RN  Fluid/Electrolyte Management: intravenous fluid replacement initiated  Fever Reduction/Comfort  Measures:   lightweight bedding   lightweight clothing     Problem: Nutrition Impaired (Sepsis/Septic Shock)  Goal: Optimal Nutrition Intake  Outcome: Ongoing, Progressing     Problem: Adult Inpatient Plan of Care  Goal: Plan of Care Review  Outcome: Ongoing, Progressing  Flowsheets (Taken 11/19/2023 1922)  Progress: improving  Plan of Care Reviewed With: patient  Goal: Patient-Specific Goal (Individualized)  Outcome: Ongoing, Progressing  Goal: Absence of Hospital-Acquired Illness or Injury  Outcome: Ongoing, Progressing  Intervention: Identify and Manage Fall Risk  Recent Flowsheet Documentation  Taken 11/19/2023 1800 by Brittany Negrete, RN  Safety Promotion/Fall Prevention: safety round/check completed  Taken 11/19/2023 1600 by Brittany Negrete RN  Safety Promotion/Fall Prevention: safety round/check completed  Taken 11/19/2023 1530 by Brittany Negrete, RN  Safety Promotion/Fall Prevention: safety round/check completed  Intervention: Prevent and Manage VTE (Venous Thromboembolism) Risk  Recent Flowsheet Documentation  Taken 11/19/2023 1530 by Brittany Negrete, RN  VTE Prevention/Management: (lovenox) other (see comments)  Goal: Optimal Comfort and Wellbeing  Outcome: Ongoing, Progressing  Intervention: Provide Person-Centered Care  Recent Flowsheet Documentation  Taken 11/19/2023 1530 by Brittany Negrete, RN  Trust Relationship/Rapport:   care explained   choices provided   emotional support provided  Goal: Readiness for Transition of Care  Outcome: Ongoing, Progressing   Goal Outcome Evaluation:

## 2023-11-20 NOTE — CONSULTS
"GI CONSULT  NOTE:    Referring Provider:  Dr. Rocha    Chief complaint: Acute pancreatitis     Subjective . \"I have had pain for months\"    History of present illness: Dayron Manley is a 43 y.o. male who has a history of alcohol abuse who presents with abdominal pain intermittently for the last 4 to 5 months.  He would have pain for a few weeks then would ease and then recur.  He reports worsening Friday night after eating [employer] and [employer].  He reports heavy alcohol use approximately 5 years ago but not as heavy more recently.  He drinks 7-8 drinks on November 3 for his birthday.  He is having generalized abdominal pain that radiates into his back with intermittent nausea and vomiting.  Inability to tolerate oral nutrition upon discharge.  He was having chills and sweats as well as tea colored urine and intermittent acholic stools.  Last bowel movement on Friday.  No history of gallbladder disease.  Denies Tylenol or NSAID use.  No recent antibiotic use.  No abdominal surgeries.  He is now having good urine output today.    Endo History:  No previous EGD or colonoscopy     Past Medical History:  Alcohol abuse    Past Surgical History:  History reviewed. No pertinent surgical history.    Social History:  Social History     Tobacco Use    Smoking status: Every Day     Packs/day: 0.50     Years: 15.00     Additional pack years: 0.00     Total pack years: 7.50     Types: Cigarettes    Smokeless tobacco: Never   Vaping Use    Vaping Use: Never used   Substance Use Topics    Alcohol use: Not Currently    Drug use: Not Currently   Alcohol abuse    Family History:  Uncles with alcohol abuse.  No family history of pancreatic disease.  Family history of gallbladder disease    Medications:  No medications prior to admission.       Scheduled Meds:enoxaparin, 40 mg, Subcutaneous, Daily  pantoprazole, 40 mg, Intravenous, Q AM  senna-docusate sodium, 2 tablet, Oral, BID      Continuous Infusions:lactated ringers, 200 " mL/hr, Last Rate: 200 mL/hr (11/20/23 0902)      PRN Meds:.  acetaminophen **OR** acetaminophen **OR** acetaminophen    aluminum-magnesium hydroxide-simethicone    senna-docusate sodium **AND** polyethylene glycol **AND** bisacodyl **AND** bisacodyl    HYDROmorphone    melatonin    ondansetron **OR** ondansetron    oxyCODONE    [COMPLETED] Insert Peripheral IV **AND** sodium chloride    sodium chloride    ALLERGIES:  Patient has no known allergies.    ROS:  The following systems were reviewed   Constitution:  No fevers, chills, no unintentional weight loss  Skin: no rash, no jaundice  Eyes:  No blurry vision, no eye pain  HENT:  No change in hearing or smell  Resp:  No dyspnea or cough  CV:  No chest pain or palpitations  :  No dysuria, hematuria  Musculoskeletal:  No leg cramps or arthralgias  Neuro:  No tremor, no numbness  Psych:  No depression or confusion    Objective ill-appearing but no acute distress, mother at bedside    Vital Signs:   Vitals:    11/20/23 0224 11/20/23 0500 11/20/23 0546 11/20/23 1043   BP: 132/79  143/83 152/87   BP Location: Right arm  Right arm Right arm   Patient Position: Lying  Lying Lying   Pulse: 63  77 67   Resp: 18  18 19   Temp: 99.5 °F (37.5 °C)  98.6 °F (37 °C) 99.1 °F (37.3 °C)   TempSrc: Axillary  Oral Oral   SpO2: 97%  99% 99%   Weight:  64.5 kg (142 lb 3.2 oz)     Height:           Physical Exam:     General Appearance:    Awake and alert, in no acute distress   Head:    Normocephalic, without obvious abnormality, atraumatic   Throat:   No oral lesions, no thrush, oral mucosa moist   Lungs:     Respirations regular, even and unlabored   Chest Wall:    No abnormalities observed   Abdomen:     Soft, upper abdominal distention, upper abdominal tenderness without rebound   Rectal:     Deferred   Extremities:   Moves all extremities, no edema, no cyanosis       Skin:   No rash, no jaundice, normal palpation       Neurologic:   Cranial nerves 2 - 12 grossly intact       Results  "Review:   I reviewed the patient's labs and imaging.  CBC    Results from last 7 days   Lab Units 11/20/23  0000 11/19/23  0903   WBC 10*3/mm3 17.40* 21.40*   HEMOGLOBIN g/dL 15.5 20.4*   PLATELETS 10*3/mm3 257 415     CMP   Results from last 7 days   Lab Units 11/20/23  0000 11/19/23  0903   SODIUM mmol/L 144 144   POTASSIUM mmol/L 4.2 4.3   CHLORIDE mmol/L 109* 97*   CO2 mmol/L 24.0 24.0   BUN mg/dL 18 21*   CREATININE mg/dL 0.99 2.53*   GLUCOSE mg/dL 112* 192*   ALBUMIN g/dL 3.9 6.9*   BILIRUBIN mg/dL 1.3* 2.1*   ALK PHOS U/L 302* 591*   AST (SGOT) U/L 72* 198*   ALT (SGPT) U/L 268* 591*   LIPASE U/L 821* 1,786*     Cr Clearance Estimated Creatinine Clearance: 88.7 mL/min (by C-G formula based on SCr of 0.99 mg/dL).  Coag     HbA1C No results found for: \"HGBA1C\"  Blood Glucose No results found for: \"POCGLU\"  Infection   Results from last 7 days   Lab Units 11/19/23  1024 11/19/23  0935   BLOODCX  No growth at 24 hours No growth at 24 hours     UA    Results from last 7 days   Lab Units 11/19/23 2034   NITRITE UA  Negative   WBC UA /HPF 0-2   BACTERIA UA /HPF None Seen   SQUAM EPITHEL UA /HPF 0-2     Radiology(recent) US Abdomen Limited    Result Date: 11/20/2023  Impression: 1.Patchy edema in the pancreatic parenchyma as seen on CT. 2.No evidence of cholelithiasis or acute cholecystitis. 3.Common bile duct dilatation, unchanged from CTA earlier today and likely related to inflammatory changes in the distal common bile duct. Electronically Signed: Lyndon Wahl MD  11/20/2023 12:12 AM EST  Workstation ID: [id number]    CT Abdomen Pelvis With Contrast    Result Date: 11/19/2023  Impression: Acute uncomplicated pancreatitis. Electronically Signed: Aristeo Byers MD  11/19/2023 10:00 AM EST  Workstation ID: [id number]        ASSESSMENT:  Acute pancreatitis -likely alcohol induced  Elevated LFTs  SINGH/dehydration -improving  Alcohol abuse  Abnormal drug screen -positive cocaine and opiates    PLAN:  Patient is a " 43-year-old male with a history of alcohol abuse who presents with 4 to 5 months of abdominal pain that worsened on Friday.  He reports a history of heavy alcohol use but not as much issue recently although had 7-8 drinks on November 3 for his birthday.  CT suggest acute uncomplicated pancreatitis.  Significant dehydration with SINGH on admission that has improved with aggressive hydration.  Continue hydration with supportive care including antiemetics and analgesics as needed.  Elevated LFTs likely from pancreatic edema without gallstones or obvious biliary obstruction on imaging.  This is improving.  Check hepatitis panel.  Will likely need eventual cholecystectomy once acute issues have resolved.  Check CRP.  Triglycerides 111.  Complete alcohol and drug cessation strongly recommended.  Continue supportive care and we will follow.    I discussed the patients findings and my recommendations with the patient.    We appreciate the referral    Electronically signed by Christelle Vogt, TARAN, 11/20/23, 10:51 AM EST.

## 2023-11-21 ENCOUNTER — INPATIENT HOSPITAL (OUTPATIENT)
Dept: URBAN - METROPOLITAN AREA HOSPITAL 84 | Facility: HOSPITAL | Age: 43
End: 2023-11-21

## 2023-11-21 DIAGNOSIS — R94.5 ABNORMAL RESULTS OF LIVER FUNCTION STUDIES: ICD-10-CM

## 2023-11-21 DIAGNOSIS — F10.10 ALCOHOL ABUSE, UNCOMPLICATED: ICD-10-CM

## 2023-11-21 DIAGNOSIS — R89.2: ICD-10-CM

## 2023-11-21 DIAGNOSIS — K85.90 ACUTE PANCREATITIS WITHOUT NECROSIS OR INFECTION, UNSPECIFIE: ICD-10-CM

## 2023-11-21 LAB
ALBUMIN SERPL-MCNC: 3 G/DL (ref 3.5–5.2)
ALBUMIN SERPL-MCNC: 3.2 G/DL (ref 3.5–5.2)
ALBUMIN/GLOB SERPL: 1.2 G/DL
ALBUMIN/GLOB SERPL: 1.4 G/DL
ALP SERPL-CCNC: 153 U/L (ref 39–117)
ALP SERPL-CCNC: 193 U/L (ref 39–117)
ALT SERPL W P-5'-P-CCNC: 135 U/L (ref 1–41)
ALT SERPL W P-5'-P-CCNC: 87 U/L (ref 1–41)
ANION GAP SERPL CALCULATED.3IONS-SCNC: 11 MMOL/L (ref 5–15)
ANION GAP SERPL CALCULATED.3IONS-SCNC: 9 MMOL/L (ref 5–15)
ANISOCYTOSIS BLD QL: ABNORMAL
AST SERPL-CCNC: 20 U/L (ref 1–40)
AST SERPL-CCNC: 27 U/L (ref 1–40)
BASOPHILS # BLD AUTO: 0 10*3/MM3 (ref 0–0.2)
BASOPHILS NFR BLD AUTO: 0.1 % (ref 0–1.5)
BILIRUB SERPL-MCNC: 0.9 MG/DL (ref 0–1.2)
BILIRUB SERPL-MCNC: 1.1 MG/DL (ref 0–1.2)
BUN SERPL-MCNC: 11 MG/DL (ref 6–20)
BUN SERPL-MCNC: 8 MG/DL (ref 6–20)
BUN/CREAT SERPL: 12.5 (ref 7–25)
BUN/CREAT SERPL: 13.1 (ref 7–25)
CALCIUM SPEC-SCNC: 8.8 MG/DL (ref 8.6–10.5)
CALCIUM SPEC-SCNC: 9.1 MG/DL (ref 8.6–10.5)
CHLORIDE SERPL-SCNC: 103 MMOL/L (ref 98–107)
CHLORIDE SERPL-SCNC: 99 MMOL/L (ref 98–107)
CO2 SERPL-SCNC: 25 MMOL/L (ref 22–29)
CO2 SERPL-SCNC: 28 MMOL/L (ref 22–29)
CREAT SERPL-MCNC: 0.64 MG/DL (ref 0.76–1.27)
CREAT SERPL-MCNC: 0.84 MG/DL (ref 0.76–1.27)
CRP SERPL-MCNC: 20.5 MG/DL (ref 0–0.5)
CRP SERPL-MCNC: 23.43 MG/DL (ref 0–0.5)
DEPRECATED RDW RBC AUTO: 37.2 FL (ref 37–54)
DEPRECATED RDW RBC AUTO: 40.7 FL (ref 37–54)
EGFRCR SERPLBLD CKD-EPI 2021: 111.7 ML/MIN/1.73
EGFRCR SERPLBLD CKD-EPI 2021: 121.2 ML/MIN/1.73
EOSINOPHIL # BLD AUTO: 0 10*3/MM3 (ref 0–0.4)
EOSINOPHIL NFR BLD AUTO: 0.2 % (ref 0.3–6.2)
ERYTHROCYTE [DISTWIDTH] IN BLOOD BY AUTOMATED COUNT: 12.7 % (ref 12.3–15.4)
ERYTHROCYTE [DISTWIDTH] IN BLOOD BY AUTOMATED COUNT: 13 % (ref 12.3–15.4)
GLOBULIN UR ELPH-MCNC: 2.3 GM/DL
GLOBULIN UR ELPH-MCNC: 2.5 GM/DL
GLUCOSE SERPL-MCNC: 101 MG/DL (ref 65–99)
GLUCOSE SERPL-MCNC: 86 MG/DL (ref 65–99)
HCT VFR BLD AUTO: 39.3 % (ref 37.5–51)
HCT VFR BLD AUTO: 40.8 % (ref 37.5–51)
HGB BLD-MCNC: 12.9 G/DL (ref 13–17.7)
HGB BLD-MCNC: 13.5 G/DL (ref 13–17.7)
LIPASE SERPL-CCNC: 133 U/L (ref 13–60)
LIPASE SERPL-CCNC: 54 U/L (ref 13–60)
LYMPHOCYTES # BLD AUTO: 0.8 10*3/MM3 (ref 0.7–3.1)
LYMPHOCYTES # BLD MANUAL: 1.3 10*3/MM3 (ref 0.7–3.1)
LYMPHOCYTES NFR BLD AUTO: 6.8 % (ref 19.6–45.3)
LYMPHOCYTES NFR BLD MANUAL: 1 % (ref 5–12)
MCH RBC QN AUTO: 27.9 PG (ref 26.6–33)
MCH RBC QN AUTO: 27.9 PG (ref 26.6–33)
MCHC RBC AUTO-ENTMCNC: 32.8 G/DL (ref 31.5–35.7)
MCHC RBC AUTO-ENTMCNC: 33 G/DL (ref 31.5–35.7)
MCV RBC AUTO: 84.6 FL (ref 79–97)
MCV RBC AUTO: 85.1 FL (ref 79–97)
METAMYELOCYTES NFR BLD MANUAL: 2 % (ref 0–0)
MICROCYTES BLD QL: ABNORMAL
MONOCYTES # BLD AUTO: 0.9 10*3/MM3 (ref 0.1–0.9)
MONOCYTES # BLD: 0.08 10*3/MM3 (ref 0.1–0.9)
MONOCYTES NFR BLD AUTO: 7.3 % (ref 5–12)
NEUTROPHILS # BLD AUTO: 6.56 10*3/MM3 (ref 1.7–7)
NEUTROPHILS NFR BLD AUTO: 10.6 10*3/MM3 (ref 1.7–7)
NEUTROPHILS NFR BLD AUTO: 85.6 % (ref 42.7–76)
NEUTROPHILS NFR BLD MANUAL: 76 % (ref 42.7–76)
NEUTS BAND NFR BLD MANUAL: 5 % (ref 0–5)
NRBC BLD AUTO-RTO: 0 /100 WBC (ref 0–0.2)
PLATELET # BLD AUTO: 185 10*3/MM3 (ref 140–450)
PLATELET # BLD AUTO: 192 10*3/MM3 (ref 140–450)
PMV BLD AUTO: 8.6 FL (ref 6–12)
PMV BLD AUTO: 9 FL (ref 6–12)
POTASSIUM SERPL-SCNC: 3.7 MMOL/L (ref 3.5–5.2)
POTASSIUM SERPL-SCNC: 4.1 MMOL/L (ref 3.5–5.2)
PROT SERPL-MCNC: 5.5 G/DL (ref 6–8.5)
PROT SERPL-MCNC: 5.5 G/DL (ref 6–8.5)
RBC # BLD AUTO: 4.62 10*6/MM3 (ref 4.14–5.8)
RBC # BLD AUTO: 4.83 10*6/MM3 (ref 4.14–5.8)
SCAN SLIDE: NORMAL
SMALL PLATELETS BLD QL SMEAR: ADEQUATE
SODIUM SERPL-SCNC: 135 MMOL/L (ref 136–145)
SODIUM SERPL-SCNC: 140 MMOL/L (ref 136–145)
VARIANT LYMPHS NFR BLD MANUAL: 12 % (ref 19.6–45.3)
VARIANT LYMPHS NFR BLD MANUAL: 4 % (ref 0–5)
WBC MORPH BLD: NORMAL
WBC NRBC COR # BLD AUTO: 12.4 10*3/MM3 (ref 3.4–10.8)
WBC NRBC COR # BLD AUTO: 8.1 10*3/MM3 (ref 3.4–10.8)

## 2023-11-21 PROCEDURE — 80053 COMPREHEN METABOLIC PANEL: CPT | Performed by: NURSE PRACTITIONER

## 2023-11-21 PROCEDURE — 85025 COMPLETE CBC W/AUTO DIFF WBC: CPT | Performed by: NURSE PRACTITIONER

## 2023-11-21 PROCEDURE — 85007 BL SMEAR W/DIFF WBC COUNT: CPT | Performed by: NURSE PRACTITIONER

## 2023-11-21 PROCEDURE — 25010000002 HYDROMORPHONE 1 MG/ML SOLUTION: Performed by: INTERNAL MEDICINE

## 2023-11-21 PROCEDURE — 25810000003 LACTATED RINGERS PER 1000 ML: Performed by: NURSE PRACTITIONER

## 2023-11-21 PROCEDURE — 86140 C-REACTIVE PROTEIN: CPT | Performed by: NURSE PRACTITIONER

## 2023-11-21 PROCEDURE — 99232 SBSQ HOSP IP/OBS MODERATE 35: CPT | Performed by: NURSE PRACTITIONER

## 2023-11-21 PROCEDURE — 83690 ASSAY OF LIPASE: CPT | Performed by: NURSE PRACTITIONER

## 2023-11-21 RX ADMIN — HYDROMORPHONE HYDROCHLORIDE 1 MG: 1 INJECTION, SOLUTION INTRAMUSCULAR; INTRAVENOUS; SUBCUTANEOUS at 04:08

## 2023-11-21 RX ADMIN — PANTOPRAZOLE SODIUM 40 MG: 40 INJECTION, POWDER, FOR SOLUTION INTRAVENOUS at 05:04

## 2023-11-21 RX ADMIN — HYDROMORPHONE HYDROCHLORIDE 1 MG: 1 INJECTION, SOLUTION INTRAMUSCULAR; INTRAVENOUS; SUBCUTANEOUS at 08:20

## 2023-11-21 RX ADMIN — HYDROMORPHONE HYDROCHLORIDE 1 MG: 1 INJECTION, SOLUTION INTRAMUSCULAR; INTRAVENOUS; SUBCUTANEOUS at 16:33

## 2023-11-21 RX ADMIN — OXYCODONE 10 MG: 5 TABLET ORAL at 18:17

## 2023-11-21 RX ADMIN — HYDROMORPHONE HYDROCHLORIDE 1 MG: 1 INJECTION, SOLUTION INTRAMUSCULAR; INTRAVENOUS; SUBCUTANEOUS at 12:25

## 2023-11-21 RX ADMIN — SODIUM CHLORIDE, POTASSIUM CHLORIDE, SODIUM LACTATE AND CALCIUM CHLORIDE 100 ML/HR: 600; 310; 30; 20 INJECTION, SOLUTION INTRAVENOUS at 18:17

## 2023-11-21 RX ADMIN — OXYCODONE 10 MG: 5 TABLET ORAL at 23:37

## 2023-11-21 RX ADMIN — HYDROMORPHONE HYDROCHLORIDE 1 MG: 1 INJECTION, SOLUTION INTRAMUSCULAR; INTRAVENOUS; SUBCUTANEOUS at 20:56

## 2023-11-21 NOTE — PLAN OF CARE
Goal Outcome Evaluation:      Pt continues with severe abdominal pain, PRN dilaudid given Q4. Explained to pt and mother at bedside that PRN oxycodone can be given in between for better pain control and pt refused. No nausea reported this shift. Urine is clearing up now yellow in color. VSS.    Problem: Adult Inpatient Plan of Care  Goal: Plan of Care Review  Outcome: Ongoing, Not Progressing

## 2023-11-21 NOTE — PLAN OF CARE
Goal Outcome Evaluation:  Plan of Care Reviewed With: patient        Progress: no change          AOX4.  Pt  on room  air.  C/O abdominal pain. Urine is  dark sarahy color.  Pt remains NPO with ice chips.     Statement Selected

## 2023-11-21 NOTE — PROGRESS NOTES
" LOS: 2 days   Patient Care Team:  Provider, No Known as PCP - General      Subjective \"I still have severe pain\"    Interval History:     Subjective: Continues to have pain that will wax and wane.  No nausea or vomiting.  Last oral intake on Friday.  Good urinary output and reports some mild swelling.    ROS:   No chest pain, shortness of breath, or cough.      Medication Review:     Current Facility-Administered Medications:     acetaminophen (TYLENOL) tablet 650 mg, 650 mg, Oral, Q4H PRN, 650 mg at 11/19/23 2029 **OR** acetaminophen (TYLENOL) 160 MG/5ML oral solution 650 mg, 650 mg, Oral, Q4H PRN **OR** acetaminophen (TYLENOL) suppository 650 mg, 650 mg, Rectal, Q4H PRN, Dianne Yoo MD    aluminum-magnesium hydroxide-simethicone (MAALOX MAX) 400-400-40 MG/5ML suspension 15 mL, 15 mL, Oral, Q6H PRN, Dianne Yoo MD    sennosides-docusate (PERICOLACE) 8.6-50 MG per tablet 2 tablet, 2 tablet, Oral, BID **AND** polyethylene glycol (MIRALAX) packet 17 g, 17 g, Oral, Daily PRN **AND** bisacodyl (DULCOLAX) EC tablet 5 mg, 5 mg, Oral, Daily PRN **AND** bisacodyl (DULCOLAX) suppository 10 mg, 10 mg, Rectal, Daily PRN, Dianne Yoo MD    Enoxaparin Sodium (LOVENOX) syringe 40 mg, 40 mg, Subcutaneous, Daily, Dianne Yoo MD, 40 mg at 11/19/23 1839    HYDROmorphone (DILAUDID) injection 1 mg, 1 mg, Intravenous, Q4H PRN, Dianne Yoo MD, 1 mg at 11/21/23 0820    lactated ringers infusion, 100 mL/hr, Intravenous, Continuous, Christelle Vogt APRN, Last Rate: 100 mL/hr at 11/21/23 1042, 100 mL/hr at 11/21/23 1042    melatonin tablet 5 mg, 5 mg, Oral, Nightly PRN, Dianne Yoo MD    ondansetron (ZOFRAN) tablet 4 mg, 4 mg, Oral, Q6H PRN **OR** ondansetron (ZOFRAN) injection 4 mg, 4 mg, Intravenous, Q6H PRN, Dianne Yoo MD, 4 mg at 11/20/23 1908    oxyCODONE (ROXICODONE) immediate release tablet 10 mg, 10 mg, Oral, Q4H PRN, Maile Purvis MD, 10 mg at 11/20/23 1747    pantoprazole (PROTONIX) injection 40 mg, 40 " mg, Intravenous, Q AM, Dianne Yoo MD, 40 mg at 11/21/23 0504    [COMPLETED] Insert Peripheral IV, , , Once **AND** sodium chloride 0.9 % flush 10 mL, 10 mL, Intravenous, PRN, Murali Belle MD    sodium chloride 0.9 % flush 10 mL, 10 mL, Intravenous, PRN, Murali Belle MD      Objective resting in bed, ill-appearing but no acute distress, family at bedside    Vital Signs  Vitals:    11/21/23 0500 11/21/23 0542 11/21/23 0600 11/21/23 0900   BP:  152/88  147/89   BP Location:  Right arm  Right arm   Patient Position:  Lying  Lying   Pulse: 63 63 61 67   Resp:  17  18   Temp:  98.9 °F (37.2 °C)  98.4 °F (36.9 °C)   TempSrc:  Oral  Oral   SpO2: 97% 99% 98% 97%   Weight:  66.6 kg (146 lb 13.2 oz)     Height:           Physical Exam:     General Appearance:    Awake and alert, in no acute distress   Head:    Normocephalic, without obvious abnormality   Eyes:          Conjunctivae normal, anicteric sclera   Throat:   No oral lesions, no thrush, oral mucosa moist   Neck:   supple, no JVD   Lungs:     respirations regular, even and unlabored       Rectal:     Deferred   Extremities:   No edema, no cyanosis   Skin:   No bruising or rash, no jaundice        Results Review:    CBC    Results from last 7 days   Lab Units 11/20/23  2346 11/20/23  0000 11/19/23  0903   WBC 10*3/mm3 12.40* 17.40* 21.40*   HEMOGLOBIN g/dL 13.5 15.5 20.4*   PLATELETS 10*3/mm3 185 257 415     CMP   Results from last 7 days   Lab Units 11/20/23  2346 11/20/23  0000 11/19/23  0903   SODIUM mmol/L 140 144 144   POTASSIUM mmol/L 4.1 4.2 4.3   CHLORIDE mmol/L 103 109* 97*   CO2 mmol/L 28.0 24.0 24.0   BUN mg/dL 11 18 21*   CREATININE mg/dL 0.84 0.99 2.53*   GLUCOSE mg/dL 101* 112* 192*   ALBUMIN g/dL 3.2* 3.9 6.9*   BILIRUBIN mg/dL 1.1 1.3* 2.1*   ALK PHOS U/L 193* 302* 591*   AST (SGOT) U/L 27 72* 198*   ALT (SGPT) U/L 135* 268* 591*   LIPASE U/L 133* 821* 1,786*     Cr Clearance Estimated Creatinine Clearance: 107.9 mL/min (by C-G formula  "based on SCr of 0.84 mg/dL).  Coag     HbA1C No results found for: \"HGBA1C\"  Blood Glucose No results found for: \"POCGLU\"  Infection   Results from last 7 days   Lab Units 11/19/23  1024 11/19/23  0935   BLOODCX  No growth at 2 days No growth at 2 days     UA    Results from last 7 days   Lab Units 11/19/23  2034   NITRITE UA  Negative   WBC UA /HPF 0-2   BACTERIA UA /HPF None Seen   SQUAM EPITHEL UA /HPF 0-2     Radiology(recent) US Abdomen Limited    Result Date: 11/20/2023  Impression: 1.Patchy edema in the pancreatic parenchyma as seen on CT. 2.No evidence of cholelithiasis or acute cholecystitis. 3.Common bile duct dilatation, unchanged from CTA earlier today and likely related to inflammatory changes in the distal common bile duct. Electronically Signed: Lyndon Wahl MD  11/20/2023 12:12 AM EST  Workstation ID: [id number]        Assessment & Plan   Acute pancreatitis -likely alcohol induced  Elevated LFTs-improving  SINGH/dehydration -resolved  Alcohol abuse  Abnormal drug screen -positive cocaine and opiates     PLAN:  Patient is a 43-year-old male with a history of alcohol abuse who presents with 4 to 5 months of abdominal pain that worsened on Friday.  He reports a history of heavy alcohol use but not as much issue recently although had 7-8 drinks on November 3 for his birthday.  CT suggest acute uncomplicated pancreatitis.  Significant dehydration with SINGH on admission that has improved with aggressive hydration.    Labs continue to improve, significantly elevated CRP with severe pancreatitis.  Okay to decrease IV fluids to 100 ml/hr. continue n.p.o. for now, may need to consider EGD with NJ placement for tube feeds if pain continues to be severe.  Hepatitis panel negative.  Continue supportive care we will follow closely.    Electronically signed by TARAN Hugo, 11/21/23, 10:58 AM EST.          "

## 2023-11-21 NOTE — PROGRESS NOTES
Pt resting soundly, chp engaged pt's mother at bedside. She noted he had been ill for some weeks. He attributes his current flare to birthday celebrations at the beginning of the month. Family supportive of one another, prayer/ relational support welcomed. Chp will continue to provide routine visits at least once weekly through admission. Additional support available as requested.

## 2023-11-21 NOTE — PROGRESS NOTES
[facility] Medicine Services Daily Progress Note    Patient Name: Dayron Manley  : 1980  MRN: [id number]  Primary Care Physician:  Provider, No Known  Date of admission: 2023      Subjective      Chief Complaint:  Abdominal pain     History of Present Illness: Dayron Manley is a 43 y.o. male who presented to [facility] on 2023 complaining of abdominal pain nausea and vomiting.  Patient gives history of abdominal pain starting yesterday at 6 PM.  Moderate to severe abdominal pain.  Pain in the epigastric region.  Associated with nausea and vomiting.  He was unable to keep food.  No diarrhea.  No rectal bleeding.  No melena.  No hematemesis.  Patient drinks alcohol however his last drink was approximately 2 weeks back.  Patient is history abdominal pain for many months.  No dysuria.  No flank pain.  Patient denies any significant past medical history.  He does not see a primary care physician on a regular basis.  Patient called his mother who brought him to [facility] emergency room.  Currently patient has epigastric pain.  No anterior chest pain.  No shortness of breath.  No dyspnea on exertion.  No weakness or numbness of the limbs.     23 patient seen and examined in bed no acute distress, still having significant nausea.  Still NPO.  Still having abdominal pain.  Discussed with RN.  Vital signs stable.  2023 patient seen and examined in bed no acute distress, says feels a little better.  Vital signs stable, still n.p.o., complaining of back pain.  Discussed with RN.    ROS A 12 point review of system was done and was negative except as mentioned above      Objective      Vitals:   Temp:  [98.8 °F (37.1 °C)-99.8 °F (37.7 °C)] 98.9 °F (37.2 °C)  Heart Rate:  [60-80] 61  Resp:  [16-19] 17  BP: (138-153)/(85-91) 152/88    Physical Exam Constitutional:       Appearance: Normal appearance.   HENT:      Head: Normocephalic and atraumatic.       Nose: Nose normal.   Cardiovascular:      Rate and Rhythm: Normal rate.      Heart sounds: Normal heart sounds.   Pulmonary:      Effort: Pulmonary effort is normal. No respiratory distress.      Breath sounds: Normal breath sounds. No stridor. No wheezing, rhonchi or rales.   Chest:      Chest wall: No tenderness.   Abdominal:      General: Abdomen is flat. There is no distension.      Palpations: There is no mass.      Tenderness: There is abdominal tenderness. There is no right CVA tenderness, left CVA tenderness, guarding or rebound.      Comments: Epigastrium and is present.  No rebound or guarding bowel sounds are present.   Musculoskeletal:      Cervical back: Normal range of motion.   Skin:     General: Skin is warm and dry.   Neurological:      General: No focal deficit present.      Mental Status: He is alert.   Psychiatric:         Mood and Affect: Mood normal.         Behavior: Behavior normal.        Result Review    Result Review:  I have personally reviewed the results from the time of this admission to 11/21/2023 08:50 EST and agree with these findings:  []  Laboratory  []  Microbiology  []  Radiology  []  EKG/Telemetry   []  Cardiology/Vascular   []  Pathology  []  Old records  []  Other:  Most notable findings include:     CBC:      Lab 11/20/23  2346 11/20/23  0000 11/19/23  0903   WBC 12.40* 17.40* 21.40*   HEMOGLOBIN 13.5 15.5 20.4*   HEMATOCRIT 40.8 48.2 60.7*   PLATELETS 185 257 415   NEUTROS ABS 10.60* 15.40* 19.69*   LYMPHS ABS 0.80 1.00  --    MONOS ABS 0.90 1.00*  --    EOS ABS 0.00 0.00  --    MCV 84.6 86.7 83.7     CMP:        Lab 11/20/23  2346 11/20/23  0000 11/19/23  0903   SODIUM 140 144 144   POTASSIUM 4.1 4.2 4.3   CHLORIDE 103 109* 97*   CO2 28.0 24.0 24.0   ANION GAP 9.0 11.0 23.0*   BUN 11 18 21*   CREATININE 0.84 0.99 2.53*   EGFR 111.7 97.5 31.6*   GLUCOSE 101* 112* 192*   CALCIUM 9.1 9.4 12.4*   TOTAL PROTEIN 5.5* 6.3 10.2*   ALBUMIN 3.2* 3.9 6.9*   GLOBULIN 2.3 2.4 3.3   ALT  "(SGPT) 135* 268* 591*   AST (SGOT) 27 72* 198*   BILIRUBIN 1.1 1.3* 2.1*   ALK PHOS 193* 302* 591*   LIPASE 133* 821* 1,786*     No results found for: \"ACANTHNAEG\", \"AFBCX\", \"BPERTUSSISCX\", \"BLOODCX\"  No results found for: \"BCIDPCR\", \"CXREFLEX\", \"CSFCX\", \"CULTURETIS\"  No results found for: \"CULTURES\", \"HSVCX\", \"URCX\"  No results found for: \"EYECULTURE\", \"GCCX\", \"HSVCULTURE\", \"LABHSV\"  No results found for: \"LEGIONELLA\", \"MRSACX\", \"MUMPSCX\", \"MYCOPLASCX\"  No results found for: \"NOCARDIACX\", \"STOOLCX\"  No results found for: \"THROATCX\", \"UNSTIMCULT\", \"URINECX\", \"CULTURE\", \"VZVCULTUR\"  No results found for: \"VIRALCULTU\", \"WOUNDCX\"      Assessment & Plan      Brief Patient Summary:  Dayron Manley is a 43 y.o. male who       enoxaparin, 40 mg, Subcutaneous, Daily  pantoprazole, 40 mg, Intravenous, Q AM  senna-docusate sodium, 2 tablet, Oral, BID       lactated ringers, 200 mL/hr, Last Rate: 200 mL/hr (11/20/23 1697)         Active Hospital Problems:  Active Hospital Problems    Diagnosis     **Pancreatitis      Is a 41-year-old male with no significant past medical history came with abdominal pain.  He gets intermittent episodes of abdominal pain for the last few months.  Abdominal pain became worse yesterday evening.  Was found to have elevated lipase.  CT scan report was reviewed which showed acute uncomplicated pancreatitis.  We will follow-up his electrolytes.  He has elevated creatinine.      Abdominal pain  Nausea and vomiting  Acute pancreatitis  - continue IV fluids.    -Zofran as needed.    -IV analgesics.    -GI consult     - ultrasound of the gallbladder. Patchy edema in the pancreatic parenchyma as seen on CT.  2.No evidence of cholelithiasis or acute cholecystitis.  3.Common bile duct dilatation, unchanged from CTA earlier today and likely related to inflammatory changes in the distal common bile duct.   -Abstinence from alcohol.      Acute kidney injury  Dehydration  Erythrocytosis.  leukocytosis.  " Improving  -Patient has elevated hemoglobin.  Questionable secondary to hemoconcentration.    -IV fluids.  Follow-up CBC.    Follow clinically.       DVT prophylaxis:  Medical DVT prophylaxis orders are present.    CODE STATUS:    Code Status (Patient has no pulse and is not breathing): CPR (Attempt to Resuscitate)  Medical Interventions (Patient has pulse or is breathing): Full Support      Disposition:  I expect patient to be discharged home.    I have utilized all available, immediate resources to obtain, update, or review the patient's current medications including all prescriptions, over-the-counter products, herbals, cannabis/cannabidiol products, and vitamin/mineral/dietary (nutritional) supplements.      Code Status (Patient has no pulse and is not breathing): CPR (Attempt to Resuscitate)  Medical Interventions (Patient has pulse or is breathing): Full Support    Next of kin or Power of :         Admission Status:  I believe this patient meets admit status.    Hospital Medicine Quality Measures for Heart Failure:  The patient has a history of heart transplant or LVAD. YES    Electronically signed by Luis Daniel Rocha MD, 11/21/23, 08:50 EST.  [facility] Hospitalist Team

## 2023-11-22 PROCEDURE — 99232 SBSQ HOSP IP/OBS MODERATE 35: CPT | Performed by: NURSE PRACTITIONER

## 2023-11-22 PROCEDURE — 25010000002 ENOXAPARIN PER 10 MG: Performed by: INTERNAL MEDICINE

## 2023-11-22 PROCEDURE — 25010000002 HYDROMORPHONE 1 MG/ML SOLUTION: Performed by: INTERNAL MEDICINE

## 2023-11-22 PROCEDURE — 25810000003 LACTATED RINGERS PER 1000 ML: Performed by: NURSE PRACTITIONER

## 2023-11-22 RX ORDER — PANTOPRAZOLE SODIUM 40 MG/1
40 TABLET, DELAYED RELEASE ORAL
Status: DISCONTINUED | OUTPATIENT
Start: 2023-11-22 | End: 2023-11-24 | Stop reason: HOSPADM

## 2023-11-22 RX ADMIN — OXYCODONE 10 MG: 5 TABLET ORAL at 13:05

## 2023-11-22 RX ADMIN — DOCUSATE SODIUM 50 MG AND SENNOSIDES 8.6 MG 2 TABLET: 8.6; 5 TABLET, FILM COATED ORAL at 21:40

## 2023-11-22 RX ADMIN — HYDROMORPHONE HYDROCHLORIDE 1 MG: 1 INJECTION, SOLUTION INTRAMUSCULAR; INTRAVENOUS; SUBCUTANEOUS at 14:45

## 2023-11-22 RX ADMIN — OXYCODONE 10 MG: 5 TABLET ORAL at 21:40

## 2023-11-22 RX ADMIN — OXYCODONE 10 MG: 5 TABLET ORAL at 08:20

## 2023-11-22 RX ADMIN — DOCUSATE SODIUM 50 MG AND SENNOSIDES 8.6 MG 2 TABLET: 8.6; 5 TABLET, FILM COATED ORAL at 08:20

## 2023-11-22 RX ADMIN — HYDROMORPHONE HYDROCHLORIDE 1 MG: 1 INJECTION, SOLUTION INTRAMUSCULAR; INTRAVENOUS; SUBCUTANEOUS at 22:48

## 2023-11-22 RX ADMIN — PANTOPRAZOLE SODIUM 40 MG: 40 INJECTION, POWDER, FOR SOLUTION INTRAVENOUS at 05:17

## 2023-11-22 RX ADMIN — HYDROMORPHONE HYDROCHLORIDE 1 MG: 1 INJECTION, SOLUTION INTRAMUSCULAR; INTRAVENOUS; SUBCUTANEOUS at 18:37

## 2023-11-22 RX ADMIN — SODIUM CHLORIDE, POTASSIUM CHLORIDE, SODIUM LACTATE AND CALCIUM CHLORIDE 100 ML/HR: 600; 310; 30; 20 INJECTION, SOLUTION INTRAVENOUS at 18:35

## 2023-11-22 RX ADMIN — OXYCODONE 10 MG: 5 TABLET ORAL at 04:01

## 2023-11-22 RX ADMIN — HYDROMORPHONE HYDROCHLORIDE 1 MG: 1 INJECTION, SOLUTION INTRAMUSCULAR; INTRAVENOUS; SUBCUTANEOUS at 01:06

## 2023-11-22 RX ADMIN — PANTOPRAZOLE SODIUM 40 MG: 40 TABLET, DELAYED RELEASE ORAL at 09:16

## 2023-11-22 RX ADMIN — OXYCODONE 10 MG: 5 TABLET ORAL at 17:40

## 2023-11-22 RX ADMIN — HYDROMORPHONE HYDROCHLORIDE 1 MG: 1 INJECTION, SOLUTION INTRAMUSCULAR; INTRAVENOUS; SUBCUTANEOUS at 09:16

## 2023-11-22 RX ADMIN — SODIUM CHLORIDE, POTASSIUM CHLORIDE, SODIUM LACTATE AND CALCIUM CHLORIDE 100 ML/HR: 600; 310; 30; 20 INJECTION, SOLUTION INTRAVENOUS at 08:58

## 2023-11-22 RX ADMIN — ENOXAPARIN SODIUM 40 MG: 100 INJECTION SUBCUTANEOUS at 15:33

## 2023-11-22 RX ADMIN — HYDROMORPHONE HYDROCHLORIDE 1 MG: 1 INJECTION, SOLUTION INTRAMUSCULAR; INTRAVENOUS; SUBCUTANEOUS at 05:17

## 2023-11-22 NOTE — PLAN OF CARE
Goal Outcome Evaluation:  Plan of Care Reviewed With: patient        Progress: no change  Outcome Evaluation: Pt with pain tonight, managed with po and IV meds. Uses the urinal, states he is too painful to get out of bed. Has LR running at 100. NPO, sips with meds. Has not had a BM since admission. VSS and call light within reach. Plan ongoing.

## 2023-11-22 NOTE — PROGRESS NOTES
[facility] Medicine Services Daily Progress Note    Patient Name: Dayron Manley  : 1980  MRN: [id number]  Primary Care Physician:  Provider, No Known  Date of admission: 2023      Subjective      Chief Complaint:  Abdominal pain     History of Present Illness: Dayron Manley is a 43 y.o. male who presented to [facility] on 2023 complaining of abdominal pain nausea and vomiting.  Patient gives history of abdominal pain starting yesterday at 6 PM.  Moderate to severe abdominal pain.  Pain in the epigastric region.  Associated with nausea and vomiting.  He was unable to keep food.  No diarrhea.  No rectal bleeding.  No melena.  No hematemesis.  Patient drinks alcohol however his last drink was approximately 2 weeks back.  Patient is history abdominal pain for many months.  No dysuria.  No flank pain.  Patient denies any significant past medical history.  He does not see a primary care physician on a regular basis.  Patient called his mother who brought him to [facility] emergency room.  Currently patient has epigastric pain.  No anterior chest pain.  No shortness of breath.  No dyspnea on exertion.  No weakness or numbness of the limbs.     23 patient seen and examined in bed no acute distress, still having significant nausea.  Still NPO.  Still having abdominal pain.  Discussed with RN.  Vital signs stable.  2023 patient seen and examined in bed no acute distress, says feels a little better.  Vital signs stable, still n.p.o., complaining of back pain.  Discussed with RN.  23 patient seen and examined no distress, feels better today,  tolerating sips of clear liquids.  on IV fluids and advance to full liquid diet with Ensure for nutritional support.  Per GI if unable to tolerate Ensure/full liquids, may need to consider EGD with [place] placement for temporary tube feeds.  Continue supportive care we will follow.     ROS A 12 point review of  system was done and was negative except as mentioned above      Objective      Vitals:   Temp:  [98 °F (36.7 °C)-99 °F (37.2 °C)] 98.4 °F (36.9 °C)  Heart Rate:  [64-76] 69  Resp:  [14-24] 14  BP: (117-139)/(81-83) 126/81    Physical Exam Constitutional:       Appearance: Normal appearance.   HENT:      Head: Normocephalic and atraumatic.      Nose: Nose normal.   Cardiovascular:      Rate and Rhythm: Normal rate.      Heart sounds: Normal heart sounds.   Pulmonary:      Effort: Pulmonary effort is normal. No respiratory distress.      Breath sounds: Normal breath sounds. No stridor. No wheezing, rhonchi or rales.   Chest:      Chest wall: No tenderness.   Abdominal:      General: Abdomen is flat. There is no distension.      Palpations: There is no mass.      Tenderness: There is abdominal tenderness. There is no right CVA tenderness, left CVA tenderness, guarding or rebound.      Comments: Epigastrium and is present.  No rebound or guarding bowel sounds are present.   Musculoskeletal:      Cervical back: Normal range of motion.   Skin:     General: Skin is warm and dry.   Neurological:      General: No focal deficit present.      Mental Status: He is alert.   Psychiatric:         Mood and Affect: Mood normal.         Behavior: Behavior normal.        Result Review    Result Review:  I have personally reviewed the results from the time of this admission to 11/22/2023 12:22 EST and agree with these findings:  []  Laboratory  []  Microbiology  []  Radiology  []  EKG/Telemetry   []  Cardiology/Vascular   []  Pathology  []  Old records  []  Other:  Most notable findings include:     CBC:      Lab 11/21/23  2300 11/20/23  2346 11/20/23  0000 11/19/23  0903   WBC 8.10 12.40* 17.40* 21.40*   HEMOGLOBIN 12.9* 13.5 15.5 20.4*   HEMATOCRIT 39.3 40.8 48.2 60.7*   PLATELETS 192 185 257 415   NEUTROS ABS 6.56 10.60* 15.40* 19.69*   LYMPHS ABS  --  0.80 1.00  --    MONOS ABS  --  0.90 1.00*  --    EOS ABS  --  0.00 0.00  --   "  MCV 85.1 84.6 86.7 83.7     CMP:        Lab 11/21/23  2300 11/20/23  2346 11/20/23  0000 11/19/23  0903   SODIUM 135* 140 144 144   POTASSIUM 3.7 4.1 4.2 4.3   CHLORIDE 99 103 109* 97*   CO2 25.0 28.0 24.0 24.0   ANION GAP 11.0 9.0 11.0 23.0*   BUN 8 11 18 21*   CREATININE 0.64* 0.84 0.99 2.53*   EGFR 121.2 111.7 97.5 31.6*   GLUCOSE 86 101* 112* 192*   CALCIUM 8.8 9.1 9.4 12.4*   TOTAL PROTEIN 5.5* 5.5* 6.3 10.2*   ALBUMIN 3.0* 3.2* 3.9 6.9*   GLOBULIN 2.5 2.3 2.4 3.3   ALT (SGPT) 87* 135* 268* 591*   AST (SGOT) 20 27 72* 198*   BILIRUBIN 0.9 1.1 1.3* 2.1*   ALK PHOS 153* 193* 302* 591*   LIPASE 54 133* 821* 1,786*     No results found for: \"ACANTHNAEG\", \"AFBCX\", \"BPERTUSSISCX\", \"BLOODCX\"  No results found for: \"BCIDPCR\", \"CXREFLEX\", \"CSFCX\", \"CULTURETIS\"  No results found for: \"CULTURES\", \"HSVCX\", \"URCX\"  No results found for: \"EYECULTURE\", \"GCCX\", \"HSVCULTURE\", \"LABHSV\"  No results found for: \"LEGIONELLA\", \"MRSACX\", \"MUMPSCX\", \"MYCOPLASCX\"  No results found for: \"NOCARDIACX\", \"STOOLCX\"  No results found for: \"THROATCX\", \"UNSTIMCULT\", \"URINECX\", \"CULTURE\", \"VZVCULTUR\"  No results found for: \"VIRALCULTU\", \"WOUNDCX\"      Assessment & Plan      Brief Patient Summary:  Dayron Manley is a 43 y.o. male who       enoxaparin, 40 mg, Subcutaneous, Daily  pantoprazole, 40 mg, Oral, Q AM  senna-docusate sodium, 2 tablet, Oral, BID       lactated ringers, 100 mL/hr, Last Rate: 100 mL/hr (11/22/23 0458)         Active Hospital Problems:  Active Hospital Problems    Diagnosis     **Pancreatitis      Is a 41-year-old male with no significant past medical history came with abdominal pain.  He gets intermittent episodes of abdominal pain for the last few months.  Abdominal pain became worse yesterday evening.  Was found to have elevated lipase.  CT scan report was reviewed which showed acute uncomplicated pancreatitis.  We will follow-up his electrolytes.  He has elevated creatinine.      Abdominal pain  Nausea and " vomiting  Acute pancreatitis  - continue IV fluids.    -Zofran as needed.    -IV analgesics.    -GI consult    tolerating sips of clear liquids.  Continue IV fluids and advance to full liquid diet with Ensure for nutritional support.  If unable to tolerate Ensure/full liquids, may need to consider EGD with [place] placement for temporary tube feeds.  Continue supportive care we will follow.   - ultrasound of the gallbladder. Patchy edema in the pancreatic parenchyma as seen on CT.  2.No evidence of cholelithiasis or acute cholecystitis.  3.Common bile duct dilatation, unchanged from CTA earlier today and likely related to inflammatory changes in the distal common bile duct.   -Abstinence from alcohol.      Acute kidney injury  Dehydration  Erythrocytosis.  leukocytosis.  Improving  -Patient has elevated hemoglobin.  Questionable secondary to hemoconcentration.    -IV fluids.  Follow-up CBC.    Follow clinically.       DVT prophylaxis:  Medical DVT prophylaxis orders are present.    CODE STATUS:    Code Status (Patient has no pulse and is not breathing): CPR (Attempt to Resuscitate)  Medical Interventions (Patient has pulse or is breathing): Full Support      Disposition:  I expect patient to be discharged home.    I have utilized all available, immediate resources to obtain, update, or review the patient's current medications including all prescriptions, over-the-counter products, herbals, cannabis/cannabidiol products, and vitamin/mineral/dietary (nutritional) supplements.      Code Status (Patient has no pulse and is not breathing): CPR (Attempt to Resuscitate)  Medical Interventions (Patient has pulse or is breathing): Full Support    Next of kin or Power of :         Admission Status:  I believe this patient meets admit status.    Hospital Medicine Quality Measures for Heart Failure:  The patient has a history of heart transplant or LVAD. YES    Electronically signed by Luis Daniel Rocha MD, 11/22/23, 12:22  EST.  [facility] Hospitalist Team

## 2023-11-22 NOTE — PROGRESS NOTES
" LOS: 3 days   Patient Care Team:  Provider, No Known as PCP - General      Subjective \" doing okay\"    Interval History:     Subjective: Tolerating sips of clear liquids without vomiting.  No bowel movement overnight.  Continues to have good urinary output.  Abdominal pain persists.    ROS:   No chest pain, shortness of breath, or cough.      Medication Review:     Current Facility-Administered Medications:     acetaminophen (TYLENOL) tablet 650 mg, 650 mg, Oral, Q4H PRN, 650 mg at 11/19/23 2029 **OR** acetaminophen (TYLENOL) 160 MG/5ML oral solution 650 mg, 650 mg, Oral, Q4H PRN **OR** acetaminophen (TYLENOL) suppository 650 mg, 650 mg, Rectal, Q4H PRN, Dianne Yoo MD    aluminum-magnesium hydroxide-simethicone (MAALOX MAX) 400-400-40 MG/5ML suspension 15 mL, 15 mL, Oral, Q6H PRN, Dianne Yoo MD    sennosides-docusate (PERICOLACE) 8.6-50 MG per tablet 2 tablet, 2 tablet, Oral, BID, 2 tablet at 11/22/23 0820 **AND** polyethylene glycol (MIRALAX) packet 17 g, 17 g, Oral, Daily PRN **AND** bisacodyl (DULCOLAX) EC tablet 5 mg, 5 mg, Oral, Daily PRN **AND** bisacodyl (DULCOLAX) suppository 10 mg, 10 mg, Rectal, Daily PRN, Dianne Yoo MD    Enoxaparin Sodium (LOVENOX) syringe 40 mg, 40 mg, Subcutaneous, Daily, Dianne Yoo MD, 40 mg at 11/19/23 1839    HYDROmorphone (DILAUDID) injection 1 mg, 1 mg, Intravenous, Q4H PRN, Dianne Yoo MD, 1 mg at 11/22/23 0916    lactated ringers infusion, 100 mL/hr, Intravenous, Continuous, Christelle Vogt APRN, Last Rate: 100 mL/hr at 11/22/23 0858, 100 mL/hr at 11/22/23 0858    melatonin tablet 5 mg, 5 mg, Oral, Nightly PRN, Dianne Yoo MD    ondansetron (ZOFRAN) tablet 4 mg, 4 mg, Oral, Q6H PRN **OR** ondansetron (ZOFRAN) injection 4 mg, 4 mg, Intravenous, Q6H PRN, Dianne Yoo MD, 4 mg at 11/20/23 1908    oxyCODONE (ROXICODONE) immediate release tablet 10 mg, 10 mg, Oral, Q4H PRN, Maile Purvis MD, 10 mg at 11/22/23 0820    pantoprazole (PROTONIX) EC tablet 40 " "mg, 40 mg, Oral, Q AM, Christelle Vogt, APRN, 40 mg at 11/22/23 0916    [COMPLETED] Insert Peripheral IV, , , Once **AND** sodium chloride 0.9 % flush 10 mL, 10 mL, Intravenous, PRN, Murali Belle MD    sodium chloride 0.9 % flush 10 mL, 10 mL, Intravenous, PRN, Murali Belle MD      Objective ill-appearing but no acute distress, no family present    Vital Signs  Vitals:    11/21/23 2134 11/21/23 2311 11/22/23 0441 11/22/23 0500   BP: 117/82 124/82 126/81    BP Location: Right arm Right arm Right arm    Patient Position: Lying Lying Lying    Pulse: 69      Resp: 18 16 14    Temp: 99 °F (37.2 °C) 99 °F (37.2 °C) 98.4 °F (36.9 °C)    TempSrc: Oral Oral Oral    SpO2: 97% 92%     Weight:  68.5 kg (151 lb 0.2 oz)  68.5 kg (151 lb 0.2 oz)   Height:  182.9 cm (72\")         Physical Exam:     General Appearance:    Awake and alert, in no acute distress   Head:    Normocephalic, without obvious abnormality   Eyes:          Conjunctivae normal, anicteric sclera   Throat:   No oral lesions, no thrush, oral mucosa moist   Neck:    supple, no JVD   Lungs:     respirations regular, even and unlabored       Rectal:     Deferred   Extremities:   No edema, no cyanosis   Skin:   No bruising or rash, no jaundice        Results Review:    CBC    Results from last 7 days   Lab Units 11/21/23 2300 11/20/23 2346 11/20/23  0000 11/19/23  0903   WBC 10*3/mm3 8.10 12.40* 17.40* 21.40*   HEMOGLOBIN g/dL 12.9* 13.5 15.5 20.4*   PLATELETS 10*3/mm3 192 185 257 415     CMP   Results from last 7 days   Lab Units 11/21/23 2300 11/20/23 2346 11/20/23  0000 11/19/23  0903   SODIUM mmol/L 135* 140 144 144   POTASSIUM mmol/L 3.7 4.1 4.2 4.3   CHLORIDE mmol/L 99 103 109* 97*   CO2 mmol/L 25.0 28.0 24.0 24.0   BUN mg/dL 8 11 18 21*   CREATININE mg/dL 0.64* 0.84 0.99 2.53*   GLUCOSE mg/dL 86 101* 112* 192*   ALBUMIN g/dL 3.0* 3.2* 3.9 6.9*   BILIRUBIN mg/dL 0.9 1.1 1.3* 2.1*   ALK PHOS U/L 153* 193* 302* 591*   AST (SGOT) U/L 20 27 72* 198* " "  ALT (SGPT) U/L 87* 135* 268* 591*   LIPASE U/L 54 133* 821* 1,786*     Cr Clearance Estimated Creatinine Clearance: 145.7 mL/min (A) (by C-G formula based on SCr of 0.64 mg/dL (L)).  Coag     HbA1C No results found for: \"HGBA1C\"  Blood Glucose No results found for: \"POCGLU\"  Infection   Results from last 7 days   Lab Units 11/19/23  1024 11/19/23  0935   BLOODCX  No growth at 2 days No growth at 2 days     UA    Results from last 7 days   Lab Units 11/19/23  2034   NITRITE UA  Negative   WBC UA /HPF 0-2   BACTERIA UA /HPF None Seen   SQUAM EPITHEL UA /HPF 0-2     Radiology(recent) No radiology results for the last day       Assessment & Plan   Acute pancreatitis -likely alcohol induced  Elevated LFTs-improving  SINGH/dehydration -resolved  Alcohol abuse  Abnormal drug screen -positive cocaine and opiates     PLAN:  Patient is a 43-year-old male with a history of alcohol abuse who presents with 4 to 5 months of abdominal pain that worsened on Friday.  He reports a history of heavy alcohol use but not as much issue recently although had 7-8 drinks on November [birth year] for his birthday.  CT suggest acute uncomplicated pancreatitis.  Significant dehydration with SINGH on admission.  Labs continue to improve with normalization of creatinine and improved LFTs.  He is tolerating sips of clear liquids.  Continue IV fluids and advance to full liquid diet with Ensure for nutritional support.  If unable to tolerate Ensure/full liquids, may need to consider EGD with [place] placement for temporary tube feeds.  Continue supportive care we will follow.    Electronically signed by TARAN Hugo, 11/22/23, 9:37 AM EST.          "

## 2023-11-22 NOTE — CASE MANAGEMENT/SOCIAL WORK
Continued Stay Note  JANE Barger     Patient Name: Dayron Manley  MRN: [id number]  Today's Date: 11/22/2023    Admit Date: 11/19/2023    Plan: Home. Family can transport at discharge.   Discharge Plan       Row Name 11/22/23 1445       Plan    Plan Home. Family can transport at discharge.    Plan Comments DC barriers: IV fluid,  Advancing diet to full liquids   if unable to tolerate full liquids/ensure, Per GI may need EDG with [place] tube placement for tube feeds                 Expected Discharge Date and Time       Expected Discharge Date Expected Discharge Time    Nov 23, 2023               Tiesha Cramer RN

## 2023-11-23 ENCOUNTER — INPATIENT HOSPITAL (OUTPATIENT)
Dept: URBAN - METROPOLITAN AREA HOSPITAL 84 | Facility: HOSPITAL | Age: 43
End: 2023-11-23

## 2023-11-23 DIAGNOSIS — R89.2: ICD-10-CM

## 2023-11-23 DIAGNOSIS — F10.10 ALCOHOL ABUSE, UNCOMPLICATED: ICD-10-CM

## 2023-11-23 DIAGNOSIS — K85.90 ACUTE PANCREATITIS WITHOUT NECROSIS OR INFECTION, UNSPECIFIE: ICD-10-CM

## 2023-11-23 DIAGNOSIS — R94.5 ABNORMAL RESULTS OF LIVER FUNCTION STUDIES: ICD-10-CM

## 2023-11-23 LAB
ALBUMIN SERPL-MCNC: 2.6 G/DL (ref 3.5–5.2)
ALBUMIN SERPL-MCNC: 2.9 G/DL (ref 3.5–5.2)
ALBUMIN/GLOB SERPL: 1 G/DL
ALBUMIN/GLOB SERPL: 1.2 G/DL
ALP SERPL-CCNC: 126 U/L (ref 39–117)
ALP SERPL-CCNC: 141 U/L (ref 39–117)
ALT SERPL W P-5'-P-CCNC: 57 U/L (ref 1–41)
ALT SERPL W P-5'-P-CCNC: 58 U/L (ref 1–41)
ANION GAP SERPL CALCULATED.3IONS-SCNC: 6 MMOL/L (ref 5–15)
ANION GAP SERPL CALCULATED.3IONS-SCNC: 6 MMOL/L (ref 5–15)
AST SERPL-CCNC: 19 U/L (ref 1–40)
AST SERPL-CCNC: 20 U/L (ref 1–40)
BILIRUB SERPL-MCNC: 0.6 MG/DL (ref 0–1.2)
BILIRUB SERPL-MCNC: 0.6 MG/DL (ref 0–1.2)
BUN SERPL-MCNC: 7 MG/DL (ref 6–20)
BUN SERPL-MCNC: 7 MG/DL (ref 6–20)
BUN/CREAT SERPL: 9.6 (ref 7–25)
BUN/CREAT SERPL: 9.7 (ref 7–25)
CALCIUM SPEC-SCNC: 8.8 MG/DL (ref 8.6–10.5)
CALCIUM SPEC-SCNC: 8.9 MG/DL (ref 8.6–10.5)
CHLORIDE SERPL-SCNC: 101 MMOL/L (ref 98–107)
CHLORIDE SERPL-SCNC: 101 MMOL/L (ref 98–107)
CO2 SERPL-SCNC: 28 MMOL/L (ref 22–29)
CO2 SERPL-SCNC: 31 MMOL/L (ref 22–29)
CREAT SERPL-MCNC: 0.72 MG/DL (ref 0.76–1.27)
CREAT SERPL-MCNC: 0.73 MG/DL (ref 0.76–1.27)
CRP SERPL-MCNC: 10.74 MG/DL (ref 0–0.5)
CRP SERPL-MCNC: 12.36 MG/DL (ref 0–0.5)
DEPRECATED RDW RBC AUTO: 38.5 FL (ref 37–54)
DEPRECATED RDW RBC AUTO: 39.8 FL (ref 37–54)
EGFRCR SERPLBLD CKD-EPI 2021: 116.5 ML/MIN/1.73
EGFRCR SERPLBLD CKD-EPI 2021: 117 ML/MIN/1.73
ERYTHROCYTE [DISTWIDTH] IN BLOOD BY AUTOMATED COUNT: 12.6 % (ref 12.3–15.4)
ERYTHROCYTE [DISTWIDTH] IN BLOOD BY AUTOMATED COUNT: 12.7 % (ref 12.3–15.4)
GLOBULIN UR ELPH-MCNC: 2.4 GM/DL
GLOBULIN UR ELPH-MCNC: 2.6 GM/DL
GLUCOSE SERPL-MCNC: 103 MG/DL (ref 65–99)
GLUCOSE SERPL-MCNC: 120 MG/DL (ref 65–99)
HCT VFR BLD AUTO: 35.7 % (ref 37.5–51)
HCT VFR BLD AUTO: 36.6 % (ref 37.5–51)
HGB BLD-MCNC: 12 G/DL (ref 13–17.7)
HGB BLD-MCNC: 12.3 G/DL (ref 13–17.7)
LIPASE SERPL-CCNC: 47 U/L (ref 13–60)
LIPASE SERPL-CCNC: 50 U/L (ref 13–60)
MCH RBC QN AUTO: 28 PG (ref 26.6–33)
MCH RBC QN AUTO: 28.1 PG (ref 26.6–33)
MCHC RBC AUTO-ENTMCNC: 33.6 G/DL (ref 31.5–35.7)
MCHC RBC AUTO-ENTMCNC: 33.6 G/DL (ref 31.5–35.7)
MCV RBC AUTO: 83.2 FL (ref 79–97)
MCV RBC AUTO: 83.6 FL (ref 79–97)
PLATELET # BLD AUTO: 230 10*3/MM3 (ref 140–450)
PLATELET # BLD AUTO: 262 10*3/MM3 (ref 140–450)
PMV BLD AUTO: 8.2 FL (ref 6–12)
PMV BLD AUTO: 8.3 FL (ref 6–12)
POTASSIUM SERPL-SCNC: 3.4 MMOL/L (ref 3.5–5.2)
POTASSIUM SERPL-SCNC: 3.9 MMOL/L (ref 3.5–5.2)
PROT SERPL-MCNC: 5.2 G/DL (ref 6–8.5)
PROT SERPL-MCNC: 5.3 G/DL (ref 6–8.5)
RBC # BLD AUTO: 4.29 10*6/MM3 (ref 4.14–5.8)
RBC # BLD AUTO: 4.38 10*6/MM3 (ref 4.14–5.8)
SODIUM SERPL-SCNC: 135 MMOL/L (ref 136–145)
SODIUM SERPL-SCNC: 138 MMOL/L (ref 136–145)
WBC NRBC COR # BLD AUTO: 5.3 10*3/MM3 (ref 3.4–10.8)
WBC NRBC COR # BLD AUTO: 5.8 10*3/MM3 (ref 3.4–10.8)

## 2023-11-23 PROCEDURE — 25010000002 ENOXAPARIN PER 10 MG: Performed by: INTERNAL MEDICINE

## 2023-11-23 PROCEDURE — 99232 SBSQ HOSP IP/OBS MODERATE 35: CPT | Performed by: NURSE PRACTITIONER

## 2023-11-23 PROCEDURE — 83690 ASSAY OF LIPASE: CPT | Performed by: NURSE PRACTITIONER

## 2023-11-23 PROCEDURE — 80053 COMPREHEN METABOLIC PANEL: CPT | Performed by: NURSE PRACTITIONER

## 2023-11-23 PROCEDURE — 85027 COMPLETE CBC AUTOMATED: CPT | Performed by: NURSE PRACTITIONER

## 2023-11-23 PROCEDURE — 86140 C-REACTIVE PROTEIN: CPT | Performed by: NURSE PRACTITIONER

## 2023-11-23 PROCEDURE — 25010000002 HYDROMORPHONE 1 MG/ML SOLUTION: Performed by: INTERNAL MEDICINE

## 2023-11-23 RX ORDER — ONDANSETRON 4 MG/1
4 TABLET, FILM COATED ORAL EVERY 6 HOURS PRN
Qty: 60 TABLET | Refills: 0 | Status: ON HOLD | OUTPATIENT
Start: 2023-11-23

## 2023-11-23 RX ORDER — POLYETHYLENE GLYCOL 3350 17 G/17G
17 POWDER, FOR SOLUTION ORAL 2 TIMES DAILY
Status: DISCONTINUED | OUTPATIENT
Start: 2023-11-23 | End: 2023-11-24 | Stop reason: HOSPADM

## 2023-11-23 RX ORDER — OXYCODONE HYDROCHLORIDE AND ACETAMINOPHEN 5; 325 MG/1; MG/1
1 TABLET ORAL EVERY 6 HOURS PRN
Qty: 14 TABLET | Refills: 0 | Status: ON HOLD | OUTPATIENT
Start: 2023-11-23

## 2023-11-23 RX ORDER — POTASSIUM CHLORIDE 20 MEQ/1
40 TABLET, EXTENDED RELEASE ORAL EVERY 4 HOURS
Qty: 4 TABLET | Refills: 0 | Status: COMPLETED | OUTPATIENT
Start: 2023-11-23 | End: 2023-11-23

## 2023-11-23 RX ORDER — OXYCODONE HCL 10 MG/1
10 TABLET, FILM COATED, EXTENDED RELEASE ORAL EVERY 12 HOURS SCHEDULED
Status: DISCONTINUED | OUTPATIENT
Start: 2023-11-23 | End: 2023-11-24 | Stop reason: HOSPADM

## 2023-11-23 RX ORDER — PANTOPRAZOLE SODIUM 40 MG/1
40 TABLET, DELAYED RELEASE ORAL
Qty: 30 TABLET | Refills: 0 | Status: ON HOLD | OUTPATIENT
Start: 2023-11-24

## 2023-11-23 RX ORDER — POLYETHYLENE GLYCOL 3350 17 G/17G
17 POWDER, FOR SOLUTION ORAL DAILY PRN
Qty: 30 EACH | Refills: 0 | Status: ON HOLD | OUTPATIENT
Start: 2023-11-23

## 2023-11-23 RX ORDER — AMOXICILLIN 250 MG
2 CAPSULE ORAL 2 TIMES DAILY
Qty: 30 TABLET | Refills: 0 | Status: ON HOLD | OUTPATIENT
Start: 2023-11-23

## 2023-11-23 RX ADMIN — OXYCODONE HYDROCHLORIDE 10 MG: 10 TABLET, FILM COATED, EXTENDED RELEASE ORAL at 12:07

## 2023-11-23 RX ADMIN — POTASSIUM CHLORIDE 40 MEQ: 1500 TABLET, EXTENDED RELEASE ORAL at 18:29

## 2023-11-23 RX ADMIN — OXYCODONE 10 MG: 5 TABLET ORAL at 18:29

## 2023-11-23 RX ADMIN — HYDROMORPHONE HYDROCHLORIDE 1 MG: 1 INJECTION, SOLUTION INTRAMUSCULAR; INTRAVENOUS; SUBCUTANEOUS at 04:00

## 2023-11-23 RX ADMIN — POTASSIUM CHLORIDE 40 MEQ: 1500 TABLET, EXTENDED RELEASE ORAL at 14:02

## 2023-11-23 RX ADMIN — OXYCODONE 10 MG: 5 TABLET ORAL at 14:02

## 2023-11-23 RX ADMIN — ENOXAPARIN SODIUM 40 MG: 100 INJECTION SUBCUTANEOUS at 16:33

## 2023-11-23 RX ADMIN — DOCUSATE SODIUM 50 MG AND SENNOSIDES 8.6 MG 2 TABLET: 8.6; 5 TABLET, FILM COATED ORAL at 08:25

## 2023-11-23 RX ADMIN — DOCUSATE SODIUM 50 MG AND SENNOSIDES 8.6 MG 2 TABLET: 8.6; 5 TABLET, FILM COATED ORAL at 20:45

## 2023-11-23 RX ADMIN — OXYCODONE HYDROCHLORIDE 10 MG: 10 TABLET, FILM COATED, EXTENDED RELEASE ORAL at 20:50

## 2023-11-23 RX ADMIN — PANTOPRAZOLE SODIUM 40 MG: 40 TABLET, DELAYED RELEASE ORAL at 05:03

## 2023-11-23 RX ADMIN — HYDROMORPHONE HYDROCHLORIDE 1 MG: 1 INJECTION, SOLUTION INTRAMUSCULAR; INTRAVENOUS; SUBCUTANEOUS at 08:25

## 2023-11-23 RX ADMIN — POLYETHYLENE GLYCOL 3350 17 G: 17 POWDER, FOR SOLUTION ORAL at 14:03

## 2023-11-23 RX ADMIN — OXYCODONE 10 MG: 5 TABLET ORAL at 09:47

## 2023-11-23 RX ADMIN — OXYCODONE 10 MG: 5 TABLET ORAL at 05:10

## 2023-11-23 RX ADMIN — POLYETHYLENE GLYCOL 3350 17 G: 17 POWDER, FOR SOLUTION ORAL at 20:45

## 2023-11-23 NOTE — PROGRESS NOTES
" LOS: 4 days   Patient Care Team:  Provider, No Known as PCP - General      Subjective   \"When can I have  my IV pain medication?\"    Interval History:   Morning labs ordered but not collected.  Routinely taking IV pain medication every 2 hours.  Plan was for discharge today but not ready.    ROS:   Abdominal pain.  No chest pain, shortness of breath, or cough.      Medication Review:     Current Facility-Administered Medications:     acetaminophen (TYLENOL) tablet 650 mg, 650 mg, Oral, Q4H PRN, 650 mg at 11/19/23 2029 **OR** acetaminophen (TYLENOL) 160 MG/5ML oral solution 650 mg, 650 mg, Oral, Q4H PRN **OR** acetaminophen (TYLENOL) suppository 650 mg, 650 mg, Rectal, Q4H PRN, Dianne Yoo MD    aluminum-magnesium hydroxide-simethicone (MAALOX MAX) 400-400-40 MG/5ML suspension 15 mL, 15 mL, Oral, Q6H PRN, Dianne Yoo MD    sennosides-docusate (PERICOLACE) 8.6-50 MG per tablet 2 tablet, 2 tablet, Oral, BID, 2 tablet at 11/23/23 0825 **AND** polyethylene glycol (MIRALAX) packet 17 g, 17 g, Oral, Daily PRN **AND** bisacodyl (DULCOLAX) EC tablet 5 mg, 5 mg, Oral, Daily PRN **AND** bisacodyl (DULCOLAX) suppository 10 mg, 10 mg, Rectal, Daily PRN, Dianne Yoo MD    Enoxaparin Sodium (LOVENOX) syringe 40 mg, 40 mg, Subcutaneous, Daily, Dianne Yoo MD, 40 mg at 11/22/23 1533    HYDROmorphone (DILAUDID) injection 1 mg, 1 mg, Intravenous, Q4H PRN, Luis Daniel Rocha MD, 1 mg at 11/23/23 0825    lactated ringers infusion, 100 mL/hr, Intravenous, Continuous, Christelle Vogt APRN, Last Rate: 100 mL/hr at 11/22/23 1835, 100 mL/hr at 11/22/23 1835    melatonin tablet 5 mg, 5 mg, Oral, Nightly PRN, Dianne Yoo MD    ondansetron (ZOFRAN) tablet 4 mg, 4 mg, Oral, Q6H PRN **OR** ondansetron (ZOFRAN) injection 4 mg, 4 mg, Intravenous, Q6H PRN, Dianne Yoo MD, 4 mg at 11/20/23 1908    oxyCODONE (ROXICODONE) immediate release tablet 10 mg, 10 mg, Oral, Q4H PRN, Maile Purvis MD, 10 mg at 11/23/23 0510    " pantoprazole (PROTONIX) EC tablet 40 mg, 40 mg, Oral, Q AM, Christelle Vogt M, APRN, 40 mg at 11/23/23 0503    [COMPLETED] Insert Peripheral IV, , , Once **AND** sodium chloride 0.9 % flush 10 mL, 10 mL, Intravenous, PRN, Murali Belle MD    sodium chloride 0.9 % flush 10 mL, 10 mL, Intravenous, PRN, Murali Belle MD      Objective   resting in bed.  Family at bedside.  NAD.    Vital Signs  Vitals:    11/22/23 2100 11/23/23 0407 11/23/23 0505 11/23/23 0543   BP: 144/93 136/88     BP Location: Right arm Right arm     Patient Position:  Lying     Pulse: 78 69     Resp: 14 18     Temp: 98.2 °F (36.8 °C) 100.5 °F (38.1 °C) 97.9 °F (36.6 °C)    TempSrc: Oral Oral Oral    SpO2: 98% 99%     Weight:    68.9 kg (151 lb 14.4 oz)   Height:           Physical Exam:   General Appearance:    Awake and alert, in no acute distress   Head:    Normocephalic, without obvious abnormality   Eyes:          Conjunctivae normal, anicteric sclera   Throat:   No oral lesions, no thrush, oral mucosa moist   Neck:    supple, no JVD   Lungs:     respirations regular, even and unlabored       Rectal:     Deferred   Extremities:   No edema, no cyanosis   Skin:   No bruising or rash, no jaundice        Results Review:    CBC    Results from last 7 days   Lab Units 11/21/23 2300 11/20/23 2346 11/20/23  0000 11/19/23  0903   WBC 10*3/mm3 8.10 12.40* 17.40* 21.40*   HEMOGLOBIN g/dL 12.9* 13.5 15.5 20.4*   PLATELETS 10*3/mm3 192 185 257 415     CMP   Results from last 7 days   Lab Units 11/21/23 2300 11/20/23 2346 11/20/23  0000 11/19/23  0903   SODIUM mmol/L 135* 140 144 144   POTASSIUM mmol/L 3.7 4.1 4.2 4.3   CHLORIDE mmol/L 99 103 109* 97*   CO2 mmol/L 25.0 28.0 24.0 24.0   BUN mg/dL 8 11 18 21*   CREATININE mg/dL 0.64* 0.84 0.99 2.53*   GLUCOSE mg/dL 86 101* 112* 192*   ALBUMIN g/dL 3.0* 3.2* 3.9 6.9*   BILIRUBIN mg/dL 0.9 1.1 1.3* 2.1*   ALK PHOS U/L 153* 193* 302* 591*   AST (SGOT) U/L 20 27 72* 198*   ALT (SGPT) U/L 87* 135* 268*  "591*   LIPASE U/L 54 133* 821* 1,786*     Cr Clearance Estimated Creatinine Clearance: 146.5 mL/min (A) (by C-G formula based on SCr of 0.64 mg/dL (L)).  Coag     HbA1C No results found for: \"HGBA1C\"  Blood Glucose No results found for: \"POCGLU\"  Infection   Results from last 7 days   Lab Units 11/19/23  1024 11/19/23  0935   BLOODCX  No growth at 3 days No growth at 3 days     UA    Results from last 7 days   Lab Units 11/19/23  2034   NITRITE UA  Negative   WBC UA /HPF 0-2   BACTERIA UA /HPF None Seen   SQUAM EPITHEL UA /HPF 0-2     Radiology(recent) No radiology results for the last day       Assessment & Plan   Acute pancreatitis -likely alcohol induced  Elevated LFTs-improving  SINGH/dehydration -resolved  Alcohol abuse  Abnormal drug screen -positive cocaine and opiates     PLAN:  Patient is a 43-year-old male with a history of alcohol abuse who presents with 4 to 5 months of abdominal pain that worsened on Friday.  He reports a history of heavy alcohol use but not as much issue recently although had 7-8 drinks on November 3 for his birthday.  CT suggest acute uncomplicated pancreatitis.  Significant dehydration with SINGH on admission.    LFTs continue to improve.    Kidney function is now normal.    CRP is down to 12.36 from 20.    Patient is tolerating clear liquids.  We will try low-fat diet today.    Continue IV fluids for now.    Recommend oral narcotic pain medication and reserve IV pain medication as last resort for pain.    Discussed parameters with patient and family of discharge.   Patient needs to have his pain controlled by p.o. pain medications and be able to tolerate diet enough to keep hydrated so that he does not need to return promptly.    Recommend that he stay overnight if at all possible to make sure he is going to be able to do this.    Electronically signed by TARAN Conklin, 11/23/23, 8:52 AM EST.           "

## 2023-11-23 NOTE — PLAN OF CARE
Goal Outcome Evaluation:  Plan of Care Reviewed With: patient        Progress: improving  Outcome Evaluation: Pt with pain, routinely asks for pain medication. Given nutritional drink tonight. Diet is full liquid, ate a few bites of dinner. VSS and call light within reach. Plan ongoing.

## 2023-11-23 NOTE — PLAN OF CARE
Goal Outcome Evaluation:                      Pt's pain improving, has gone as low as a 6. IV dilaudid d/c'd, plans for discharge tomorrow if no IV paint medications and tolerates diet.

## 2023-11-23 NOTE — PROGRESS NOTES
[facility] Medicine Services Daily Progress Note    Patient Name: Dayron Manley  : 1980  MRN: [id number]  Primary Care Physician:  Provider, No Known  Date of admission: 2023      Subjective      Chief Complaint:  Abdominal pain     History of Present Illness: Dayron Manley is a 43 y.o. male who presented to [facility] on 2023 complaining of abdominal pain nausea and vomiting.  Patient gives history of abdominal pain starting yesterday at 6 PM.  Moderate to severe abdominal pain.  Pain in the epigastric region.  Associated with nausea and vomiting.  He was unable to keep food.  No diarrhea.  No rectal bleeding.  No melena.  No hematemesis.  Patient drinks alcohol however his last drink was approximately 2 weeks back.  Patient is history abdominal pain for many months.  No dysuria.  No flank pain.  Patient denies any significant past medical history.  He does not see a primary care physician on a regular basis.  Patient called his mother who brought him to [facility] emergency room.  Currently patient has epigastric pain.  No anterior chest pain.  No shortness of breath.  No dyspnea on exertion.  No weakness or numbness of the limbs.     23 patient seen and examined in bed no acute distress, still having significant nausea.  Still NPO.  Still having abdominal pain.  Discussed with RN.  Vital signs stable.  2023 patient seen and examined in bed no acute distress, says feels a little better.  Vital signs stable, still n.p.o., complaining of back pain.  Discussed with RN.  23 patient seen and examined no distress, feels better today,  tolerating sips of clear liquids.  on IV fluids and advance to full liquid diet with Ensure for nutritional support.  Per GI if unable to tolerate Ensure/full liquids, may need to consider EGD with NJ placement for temporary tube feeds.  Continue supportive care we will follow.   2023 patient seen and  examined in bed no acute distress, vital signs stable, still complaining of pain, per nursing.  Asking for Dilaudid IV.  Will DC Dilaudid.  Give oxy p.o.  Discussed with RNGissel MURRY A 12 point review of system was done and was negative except as mentioned above      Objective      Vitals:   Temp:  [97.3 °F (36.3 °C)-100.5 °F (38.1 °C)] 97.9 °F (36.6 °C)  Heart Rate:  [62-78] 67  Resp:  [14-18] 14  BP: (130-144)/(86-93) 130/89    Physical Exam Constitutional:       Appearance: Normal appearance.   HENT:      Head: Normocephalic and atraumatic.      Nose: Nose normal.   Cardiovascular:      Rate and Rhythm: Normal rate.      Heart sounds: Normal heart sounds.   Pulmonary:      Effort: Pulmonary effort is normal. No respiratory distress.      Breath sounds: Normal breath sounds. No stridor. No wheezing, rhonchi or rales.   Chest:      Chest wall: No tenderness.   Abdominal:      General: Abdomen is flat. There is no distension.      Palpations: There is no mass.      Tenderness: There is abdominal tenderness. There is no right CVA tenderness, left CVA tenderness, guarding or rebound.      Comments: Epigastrium and is present.  No rebound or guarding bowel sounds are present.   Musculoskeletal:      Cervical back: Normal range of motion.   Skin:     General: Skin is warm and dry.   Neurological:      General: No focal deficit present.      Mental Status: He is alert.   Psychiatric:         Mood and Affect: Mood normal.         Behavior: Behavior normal.        Result Review    Result Review:  I have personally reviewed the results from the time of this admission to 11/23/2023 11:51 EST and agree with these findings:  []  Laboratory  []  Microbiology  []  Radiology  []  EKG/Telemetry   []  Cardiology/Vascular   []  Pathology  []  Old records  []  Other:  Most notable findings include:     CBC:      Lab 11/21/23  2300 11/20/23  2346 11/20/23  0000 11/19/23  0903   WBC 8.10 12.40* 17.40* 21.40*   HEMOGLOBIN 12.9* 13.5 15.5  "20.4*   HEMATOCRIT 39.3 40.8 48.2 60.7*   PLATELETS 192 185 257 415   NEUTROS ABS 6.56 10.60* 15.40* 19.69*   LYMPHS ABS  --  0.80 1.00  --    MONOS ABS  --  0.90 1.00*  --    EOS ABS  --  0.00 0.00  --    MCV 85.1 84.6 86.7 83.7     CMP:        Lab 11/21/23  2300 11/20/23  2346 11/20/23  0000 11/19/23  0903   SODIUM 135* 140 144 144   POTASSIUM 3.7 4.1 4.2 4.3   CHLORIDE 99 103 109* 97*   CO2 25.0 28.0 24.0 24.0   ANION GAP 11.0 9.0 11.0 23.0*   BUN 8 11 18 21*   CREATININE 0.64* 0.84 0.99 2.53*   EGFR 121.2 111.7 97.5 31.6*   GLUCOSE 86 101* 112* 192*   CALCIUM 8.8 9.1 9.4 12.4*   TOTAL PROTEIN 5.5* 5.5* 6.3 10.2*   ALBUMIN 3.0* 3.2* 3.9 6.9*   GLOBULIN 2.5 2.3 2.4 3.3   ALT (SGPT) 87* 135* 268* 591*   AST (SGOT) 20 27 72* 198*   BILIRUBIN 0.9 1.1 1.3* 2.1*   ALK PHOS 153* 193* 302* 591*   LIPASE 54 133* 821* 1,786*     No results found for: \"ACANTHNAEG\", \"AFBCX\", \"BPERTUSSISCX\", \"BLOODCX\"  No results found for: \"BCIDPCR\", \"CXREFLEX\", \"CSFCX\", \"CULTURETIS\"  No results found for: \"CULTURES\", \"HSVCX\", \"URCX\"  No results found for: \"EYECULTURE\", \"GCCX\", \"HSVCULTURE\", \"LABHSV\"  No results found for: \"LEGIONELLA\", \"MRSACX\", \"MUMPSCX\", \"MYCOPLASCX\"  No results found for: \"NOCARDIACX\", \"STOOLCX\"  No results found for: \"THROATCX\", \"UNSTIMCULT\", \"URINECX\", \"CULTURE\", \"VZVCULTUR\"  No results found for: \"VIRALCULTU\", \"WOUNDCX\"      Assessment & Plan      Brief Patient Summary:  Dayron Manley is a 43 y.o. male who       enoxaparin, 40 mg, Subcutaneous, Daily  oxyCODONE, 10 mg, Oral, Q12H  pantoprazole, 40 mg, Oral, Q AM  senna-docusate sodium, 2 tablet, Oral, BID       lactated ringers, 100 mL/hr, Last Rate: 100 mL/hr (11/22/23 6565)         Active Hospital Problems:  Active Hospital Problems    Diagnosis     **Pancreatitis      Is a 41-year-old male with no significant past medical history came with abdominal pain.  He gets intermittent episodes of abdominal pain for the last few months.  Abdominal pain became worse " yesterday evening.  Was found to have elevated lipase.  CT scan report was reviewed which showed acute uncomplicated pancreatitis.  We will follow-up his electrolytes.  He has elevated creatinine.      Abdominal pain  Nausea and vomiting  Acute pancreatitis  - continue IV fluids.    -Zofran as needed.    -IV analgesics.  DC  -GI consult    tolerating sips of clear liquids.  Continue IV fluids and advance to full liquid diet with Ensure for nutritional support.  If unable to tolerate Ensure/full liquids, may need to consider EGD with NJ placement for temporary tube feeds.  Continue supportive care we will follow.   - ultrasound of the gallbladder. Patchy edema in the pancreatic parenchyma as seen on CT.  2.No evidence of cholelithiasis or acute cholecystitis.  3.Common bile duct dilatation, unchanged from CTA earlier today and likely related to inflammatory changes in the distal common bile duct.   -Abstinence from alcohol.      Acute kidney injury  Dehydration  Erythrocytosis.  leukocytosis.  Improving  -Patient has elevated hemoglobin.  Questionable secondary to hemoconcentration.    -IV fluids.  Follow-up CBC.    Follow clinically.       DVT prophylaxis:  Medical DVT prophylaxis orders are present.    CODE STATUS:    Code Status (Patient has no pulse and is not breathing): CPR (Attempt to Resuscitate)  Medical Interventions (Patient has pulse or is breathing): Full Support      Disposition:  I expect patient to be discharged home.    I have utilized all available, immediate resources to obtain, update, or review the patient's current medications including all prescriptions, over-the-counter products, herbals, cannabis/cannabidiol products, and vitamin/mineral/dietary (nutritional) supplements.      Code Status (Patient has no pulse and is not breathing): CPR (Attempt to Resuscitate)  Medical Interventions (Patient has pulse or is breathing): Full Support    Next of kin or Power of :         Admission  Status:  I believe this patient meets admit status.    Hospital Medicine Quality Measures for Heart Failure:  The patient has a history of heart transplant or LVAD. YES    Electronically signed by Luis Daniel Rocha MD, 11/23/23, 11:51 EST.  [facility] Hospitalist Team

## 2023-11-23 NOTE — CASE MANAGEMENT/SOCIAL WORK
Social Work Assessment  [facility]     Patient Name: Dayron Manley  MRN: [id number]  Today's Date: 11/23/2023    Admit Date: 11/19/2023     Substance Abuse       Row Name 11/23/23 1004       Substance Use    Substance Use Status current street drug/inhalant/medication abuse;current tobacco use    Readiness to Change Street Use precontemplation    Previous Substance Use Treatment inpatient rehab    Substance Use Comment SW was consulted re: UDS+ for cocaine and opiates. SW reviewed chart and noted Dilaudid was administered in ED prior to UDS collection. SW met with pt and his mother in room 4109 to complete MH and CD screen. Pt declined offer for his mother to step out of the room and gave verbal permission to converse with her present. Pt denies any MH diagnoses/concerns. He reports smoking about 8 cig/day, having begun smoking at 15-16 y.o. He is not interested in NRT and endorses quit attempt on his own. Pt reports last etoh use was on his birthday about 2 weeks ago in which he had 8 etoh drinks. Pt reports the last etoh prior to that was a year ago, as he doesn’t usually drink. Pt reports CD history, having used heroin, cocaine, methamphetamines, acid, and pain pills but reports last use was 5 years ago when he went to treatment x4.5 months at [facility]. He denies IVDU ever. He denies recent drug use despite +UDS, likely 2/2 his mother being present during conversation. Pt reports treatment was helpful in the past and has contacts if he would like to return.             ROZINA Reilly, [facility] [place]  Medical Social Worker  Ph [phone]  Fax [phone]  [email or web address]

## 2023-11-24 ENCOUNTER — INPATIENT HOSPITAL (OUTPATIENT)
Dept: URBAN - METROPOLITAN AREA HOSPITAL 84 | Facility: HOSPITAL | Age: 43
End: 2023-11-24

## 2023-11-24 VITALS
TEMPERATURE: 98.1 F | SYSTOLIC BLOOD PRESSURE: 119 MMHG | DIASTOLIC BLOOD PRESSURE: 84 MMHG | HEART RATE: 71 BPM | HEIGHT: 72 IN | OXYGEN SATURATION: 99 % | BODY MASS INDEX: 20.57 KG/M2 | RESPIRATION RATE: 13 BRPM | WEIGHT: 151.9 LBS

## 2023-11-24 DIAGNOSIS — K85.90 ACUTE PANCREATITIS WITHOUT NECROSIS OR INFECTION, UNSPECIFIE: ICD-10-CM

## 2023-11-24 DIAGNOSIS — R89.2: ICD-10-CM

## 2023-11-24 DIAGNOSIS — R94.5 ABNORMAL RESULTS OF LIVER FUNCTION STUDIES: ICD-10-CM

## 2023-11-24 DIAGNOSIS — F10.10 ALCOHOL ABUSE, UNCOMPLICATED: ICD-10-CM

## 2023-11-24 LAB
BACTERIA SPEC AEROBE CULT: NORMAL
BACTERIA SPEC AEROBE CULT: NORMAL

## 2023-11-24 PROCEDURE — 99232 SBSQ HOSP IP/OBS MODERATE 35: CPT | Performed by: NURSE PRACTITIONER

## 2023-11-24 RX ADMIN — POLYETHYLENE GLYCOL 3350 17 G: 17 POWDER, FOR SOLUTION ORAL at 08:37

## 2023-11-24 RX ADMIN — DOCUSATE SODIUM 50 MG AND SENNOSIDES 8.6 MG 2 TABLET: 8.6; 5 TABLET, FILM COATED ORAL at 08:37

## 2023-11-24 RX ADMIN — PANTOPRAZOLE SODIUM 40 MG: 40 TABLET, DELAYED RELEASE ORAL at 05:18

## 2023-11-24 RX ADMIN — BISACODYL 5 MG: 5 TABLET, COATED ORAL at 10:37

## 2023-11-24 RX ADMIN — OXYCODONE HYDROCHLORIDE 10 MG: 10 TABLET, FILM COATED, EXTENDED RELEASE ORAL at 08:37

## 2023-11-24 NOTE — CASE MANAGEMENT/SOCIAL WORK
Case Management Discharge Note      Final Note: HOME    Provided Post Acute Provider List?: N/A    Selected Continued Care - Discharged on 11/24/2023 Admission date: 11/19/2023 - Discharge disposition: Home or Self Care            Transportation Services  Private: Car    Final Discharge Disposition Code: 01 - home or self-care

## 2023-11-24 NOTE — PROGRESS NOTES
" LOS: 5 days   Patient Care Team:  Provider, No Known as PCP - General      Subjective    \" Doing okay I guess\"    Interval History:    Took Oxycodone & MS contin yesterday for pain. No IV pain meds.   Tolerated low fat diet.   Labs: Sodium 135, potassium 3.9, creatinine 0.72.  TB 0.6, alk phos down to 126, AST 20 and ALT 57.  CRP down to 10.74 from 12.36 yesterday.  Lipase 50.  WBCs 5.8, hemoglobin 12, platelets 262.    ROS:   Abdominal pain.  No chest pain, shortness of breath, or cough.      Medication Review:     Current Facility-Administered Medications:     acetaminophen (TYLENOL) tablet 650 mg, 650 mg, Oral, Q4H PRN, 650 mg at 11/19/23 2029 **OR** acetaminophen (TYLENOL) 160 MG/5ML oral solution 650 mg, 650 mg, Oral, Q4H PRN **OR** acetaminophen (TYLENOL) suppository 650 mg, 650 mg, Rectal, Q4H PRN, Dianne Yoo MD    aluminum-magnesium hydroxide-simethicone (MAALOX MAX) 400-400-40 MG/5ML suspension 15 mL, 15 mL, Oral, Q6H PRN, Dianne Yoo MD    sennosides-docusate (PERICOLACE) 8.6-50 MG per tablet 2 tablet, 2 tablet, Oral, BID, 2 tablet at 11/24/23 0837 **AND** polyethylene glycol (MIRALAX) packet 17 g, 17 g, Oral, Daily PRN **AND** bisacodyl (DULCOLAX) EC tablet 5 mg, 5 mg, Oral, Daily PRN, 5 mg at 11/24/23 1037 **AND** bisacodyl (DULCOLAX) suppository 10 mg, 10 mg, Rectal, Daily PRN, Dianne Yoo MD    Calcium Replacement - Follow Nurse / BPA Driven Protocol, , Does not apply, PRN, Luis Daniel Rocha MD    Enoxaparin Sodium (LOVENOX) syringe 40 mg, 40 mg, Subcutaneous, Daily, Dianne Yoo MD, 40 mg at 11/23/23 1633    lactated ringers infusion, 100 mL/hr, Intravenous, Continuous, Christelle Vogt, APRN, Last Rate: 100 mL/hr at 11/22/23 1835, 100 mL/hr at 11/22/23 1835    Magnesium Standard Dose Replacement - Follow Nurse / BPA Driven Protocol, , Does not apply, PRN, Luis Daniel Rocha MD    melatonin tablet 5 mg, 5 mg, Oral, Nightly PRN, Dianne Yoo MD    ondansetron (ZOFRAN) tablet 4 mg, 4 " mg, Oral, Q6H PRN **OR** ondansetron (ZOFRAN) injection 4 mg, 4 mg, Intravenous, Q6H PRN, Dianne Yoo MD, 4 mg at 11/20/23 1908    oxyCODONE (oxyCONTIN) 12 hr tablet 10 mg, 10 mg, Oral, Q12H, Luis Daniel Rocha MD, 10 mg at 11/24/23 0837    oxyCODONE (ROXICODONE) immediate release tablet 10 mg, 10 mg, Oral, Q4H PRN, Maile Purvis MD, 10 mg at 11/23/23 1829    pantoprazole (PROTONIX) EC tablet 40 mg, 40 mg, Oral, Q AM, Christelle Vogt APRN, 40 mg at 11/24/23 0518    Phosphorus Replacement - Follow Nurse / BPA Driven Protocol, , Does not apply, Josefina CAPPS Federico N, MD    polyethylene glycol (MIRALAX) packet 17 g, 17 g, Oral, BID, Sol Nelson APRN, 17 g at 11/24/23 0837    Potassium Replacement - Follow Nurse / BPA Driven Protocol, , Does not apply, Josefina CAPPS Federico N, MD    [COMPLETED] Insert Peripheral IV, , , Once **AND** sodium chloride 0.9 % flush 10 mL, 10 mL, Intravenous, PRN, Murali Belle MD    sodium chloride 0.9 % flush 10 mL, 10 mL, Intravenous, PRNYoshi Stanley R, MD      Objective resting on side of the bed.  NAD.    Vital Signs  Vitals:    11/23/23 0543 11/23/23 1115 11/23/23 2051 11/24/23 0430   BP:  130/89 119/84    BP Location:  Right arm Right arm    Patient Position:  Lying Lying    Pulse:  67 71    Resp:  14 13 13   Temp:  97.9 °F (36.6 °C) 98.3 °F (36.8 °C) 98.1 °F (36.7 °C)   TempSrc:  Oral Oral Oral   SpO2:  99% 99%    Weight: 68.9 kg (151 lb 14.4 oz)      Height:           Physical Exam:   General Appearance:    Awake and alert, in no acute distress   Head:    Normocephalic, without obvious abnormality   Eyes:          Conjunctivae normal, anicteric sclera   Throat:   No oral lesions, no thrush, oral mucosa moist   Neck:    supple, no JVD   Lungs:     respirations regular, even and unlabored       Rectal:     Deferred   Extremities:   No edema, no cyanosis   Skin:   No bruising or rash, no jaundice        Results Review:    CBC    Results from last 7 days  "  Lab Units 11/23/23  2241 11/23/23  1234 11/21/23  2300 11/20/23  2346 11/20/23  0000 11/19/23  0903   WBC 10*3/mm3 5.80 5.30 8.10 12.40* 17.40* 21.40*   HEMOGLOBIN g/dL 12.0* 12.3* 12.9* 13.5 15.5 20.4*   PLATELETS 10*3/mm3 262 230 192 185 257 415     CMP   Results from last 7 days   Lab Units 11/23/23  2241 11/23/23  1234 11/21/23  2300 11/20/23  2346 11/20/23  0000 11/19/23  0903   SODIUM mmol/L 135* 138 135* 140 144 144   POTASSIUM mmol/L 3.9 3.4* 3.7 4.1 4.2 4.3   CHLORIDE mmol/L 101 101 99 103 109* 97*   CO2 mmol/L 28.0 31.0* 25.0 28.0 24.0 24.0   BUN mg/dL 7 7 8 11 18 21*   CREATININE mg/dL 0.72* 0.73* 0.64* 0.84 0.99 2.53*   GLUCOSE mg/dL 103* 120* 86 101* 112* 192*   ALBUMIN g/dL 2.9* 2.6* 3.0* 3.2* 3.9 6.9*   BILIRUBIN mg/dL 0.6 0.6 0.9 1.1 1.3* 2.1*   ALK PHOS U/L 126* 141* 153* 193* 302* 591*   AST (SGOT) U/L 20 19 20 27 72* 198*   ALT (SGPT) U/L 57* 58* 87* 135* 268* 591*   LIPASE U/L 50 47 54 133* 821* 1,786*     Cr Clearance Estimated Creatinine Clearance: 130.3 mL/min (A) (by C-G formula based on SCr of 0.72 mg/dL (L)).  Coag     HbA1C No results found for: \"HGBA1C\"  Blood Glucose No results found for: \"POCGLU\"  Infection   Results from last 7 days   Lab Units 11/19/23  1024 11/19/23  0935   BLOODCX  No growth at 5 days No growth at 5 days     UA    Results from last 7 days   Lab Units 11/19/23 2034   NITRITE UA  Negative   WBC UA /HPF 0-2   BACTERIA UA /HPF None Seen   SQUAM EPITHEL UA /HPF 0-2     Radiology(recent) No radiology results for the last day       Assessment & Plan   Acute pancreatitis -likely alcohol induced  Elevated LFTs-improving  SINGH/dehydration -resolved  Alcohol abuse  Abnormal drug screen -positive cocaine and opiates     PLAN:  Patient is a 43-year-old male with a history of alcohol abuse who presents with 4 to 5 months of abdominal pain that worsened on Friday.  He reports a history of heavy alcohol use but not as much issue recently although had 7-8 drinks on November 3 for " his birthday.  CT suggest acute uncomplicated pancreatitis.  Significant dehydration with SINGH on admission.    No IV opiates in the last 24 hours.  Has been taking MS Contin and oxycodone.  LFTs continue to improve.    Kidney function is now normal.    CRP is down to 10.74.  Tolerated low-fat diet yesterday.      Okay for discharge from GI standpoint.      Electronically signed by TARAN Conklin, 11/24/23, 11:47 AM EST.

## 2023-11-24 NOTE — PLAN OF CARE
Goal Outcome Evaluation:      Patient is doing well. Pain managed with scheduled MAR so far. Has been up ad sina with no issues. Care continuing.

## 2023-11-24 NOTE — DISCHARGE SUMMARY
[facility] Medicine Services  DISCHARGE SUMMARY    Patient Name: Dayron Manley  : 1980  MRN: [id number]    Date of Admission: 2023  Discharge Diagnosis: Acute pancreatitis  Date of Discharge:  23  Primary Care Physician: Provider, No Known      Presenting Problem:   Lactic acidosis [E87.20]  Pancreatitis [K85.90]  Acute kidney injury [N17.9]  Acute pancreatitis, unspecified complication status, unspecified pancreatitis type [K85.90]    Active and Resolved Hospital Problems:  Active Hospital Problems    Diagnosis POA    **Pancreatitis [K85.90] Yes      Resolved Hospital Problems   No resolved problems to display.     Abdominal pain  Nausea and vomiting  Acute pancreatitis  -Zofran as needed.    -po analgesics.   -GI consulted    tolerating sips of clear liquids.  Continue IV fluids and advance to full liquid diet with Ensure for nutritional support.  If unable to tolerate Ensure/full liquids, may need to consider EGD with NJ placement for temporary tube feeds.  Continue supportive care we will follow.   - ultrasound of the gallbladder. Patchy edema in the pancreatic parenchyma as seen on CT.  2.No evidence of cholelithiasis or acute cholecystitis.  3.Common bile duct dilatation, unchanged from CTA earlier today and likely related to inflammatory changes in the distal common bile duct.   -Abstinence from alcohol.       Acute kidney injury  Dehydration  Erythrocytosis.  leukocytosis.  Improving  -Patient has elevated hemoglobin.  Questionable secondary to hemoconcentration.    -IV fluids.  Follow-up CBC.    Follow clinically.       Hospital Course     History of Present Illness: Dayron Manley is a 43 y.o. male who presented to [facility] on 2023 complaining of abdominal pain nausea and vomiting.  Patient gives history of abdominal pain starting yesterday at 6 PM.  Moderate to severe abdominal pain.  Pain in the epigastric region.  Associated  with nausea and vomiting.  He was unable to keep food.  No diarrhea.  No rectal bleeding.  No melena.  No hematemesis.  Patient drinks alcohol however his last drink was approximately 2 weeks back.  Patient is history abdominal pain for many months.  No dysuria.  No flank pain.  Patient denies any significant past medical history.  He does not see a primary care physician on a regular basis.  Patient called his mother who brought him to [facility] [place] emergency room.  Currently patient has epigastric pain.  No anterior chest pain.  No shortness of breath.  No dyspnea on exertion.  No weakness or numbness of the limbs.     11/20/23 patient seen and examined in bed no acute distress, still having significant nausea.  Still NPO.  Still having abdominal pain.  Discussed with RN.  Vital signs stable.  11/21/2023 patient seen and examined in bed no acute distress, says feels a little better.  Vital signs stable, still n.p.o., complaining of back pain.  Discussed with RN.  11/22/23 patient seen and examined no distress, feels better today,  tolerating sips of clear liquids.  on IV fluids and advance to full liquid diet with Ensure for nutritional support.  Per GI if unable to tolerate Ensure/full liquids, may need to consider EGD with NJ placement for temporary tube feeds.  Continue supportive care we will follow.   11/23/2023 patient seen and examined in bed no acute distress, vital signs stable, still complaining of pain, per nursing.  Asking for Dilaudid IV.  Will DC Dilaudid.  Give oxy p.o.  Discussed with RN.   11/24/23 patient seen and examined in bed no acute distress, doing better today, will discharge patient home.  Condition at discharge stable.  Discussed with RN.    DISCHARGE Follow Up Recommendations for labs and diagnostics: Follow with PCP in a week.      Reasons For Change In Medications and Indications for New Medications:  START taking:  ondansetron (ZOFRAN)   oxyCODONE-acetaminophen (Percocet)    pantoprazole (PROTONIX)   polyethylene glycol (MIRALAX)   sennosides-docusate (PERICOLACE)    Day of Discharge     Vital Signs:  Temp:  [97.9 °F (36.6 °C)-98.3 °F (36.8 °C)] 98.1 °F (36.7 °C)  Heart Rate:  [67-71] 71  Resp:  [13-14] 13  BP: (119-130)/(84-89) 119/84    Physical Exam Constitutional:       Appearance: Normal appearance.   HENT:      Head: Normocephalic and atraumatic.      Nose: Nose normal.   Cardiovascular:      Rate and Rhythm: Normal rate.      Heart sounds: Normal heart sounds.   Pulmonary:      Effort: Pulmonary effort is normal. No respiratory distress.      Breath sounds: Normal breath sounds. No stridor. No wheezing, rhonchi or rales.   Chest:      Chest wall: No tenderness.   Abdominal:      General: Abdomen is flat. There is no distension.      Palpations: There is no mass.      Tenderness: There is abdominal tenderness. There is no right CVA tenderness, left CVA tenderness, guarding or rebound.      Comments: Epigastrium and is present.  No rebound or guarding bowel sounds are present.   Musculoskeletal:      Cervical back: Normal range of motion.   Skin:     General: Skin is warm and dry.   Neurological:      General: No focal deficit present.      Mental Status: He is alert.   Psychiatric:         Mood and Affect: Mood normal.         Behavior: Behavior normal.       Pertinent  and/or Most Recent Results     LAB RESULTS:      Lab 11/23/23  2241 11/23/23  1234 11/21/23  2300 11/20/23  2346 11/20/23  0000 11/19/23  1207 11/19/23  0909 11/19/23  0903   WBC 5.80 5.30 8.10 12.40* 17.40*  --   --  21.40*   HEMOGLOBIN 12.0* 12.3* 12.9* 13.5 15.5  --   --  20.4*   HEMATOCRIT 35.7* 36.6* 39.3 40.8 48.2  --   --  60.7*   PLATELETS 262 230 192 185 257  --   --  415   NEUTROS ABS  --   --  6.56 10.60* 15.40*  --   --  19.69*   LYMPHS ABS  --   --   --  0.80 1.00  --   --   --    MONOS ABS  --   --   --  0.90 1.00*  --   --   --    EOS ABS  --   --   --  0.00 0.00  --   --   --    MCV 83.2 83.6 85.1  84.6 86.7  --   --  83.7   CRP 10.74* 12.36* 20.50* 23.43*  --   --   --   --    LACTATE  --   --   --   --   --  1.4 5.2*  --          Lab 11/23/23 2241 11/23/23  1234 11/21/23  2300 11/20/23  2346 11/20/23  0000   SODIUM 135* 138 135* 140 144   POTASSIUM 3.9 3.4* 3.7 4.1 4.2   CHLORIDE 101 101 99 103 109*   CO2 28.0 31.0* 25.0 28.0 24.0   ANION GAP 6.0 6.0 11.0 9.0 11.0   BUN 7 7 8 11 18   CREATININE 0.72* 0.73* 0.64* 0.84 0.99   EGFR 117.0 116.5 121.2 111.7 97.5   GLUCOSE 103* 120* 86 101* 112*   CALCIUM 8.9 8.8 8.8 9.1 9.4         Lab 11/23/23 2241 11/23/23 1234 11/21/23  2300 11/20/23  2346 11/20/23  0000   TOTAL PROTEIN 5.3* 5.2* 5.5* 5.5* 6.3   ALBUMIN 2.9* 2.6* 3.0* 3.2* 3.9   GLOBULIN 2.4 2.6 2.5 2.3 2.4   ALT (SGPT) 57* 58* 87* 135* 268*   AST (SGOT) 20 19 20 27 72*   BILIRUBIN 0.6 0.6 0.9 1.1 1.3*   ALK PHOS 126* 141* 153* 193* 302*   LIPASE 50 47 54 133* 821*             Lab 11/20/23  0000   CHOLESTEROL 137   LDL CHOL 75   HDL CHOL 42   TRIGLYCERIDES 111             Brief Urine Lab Results  (Last result in the past 365 days)        Color   Clarity   Blood   Leuk Est   Nitrite   Protein   CREAT   Urine HCG        11/19/23 2034 Yellow   Clear   Negative   Negative   Negative   30 mg/dL (1+)                 Microbiology Results (last 10 days)       Procedure Component Value - Date/Time    Blood Culture - Blood, Arm, Right [[id number]]  (Normal) Collected: 11/19/23 1024    Lab Status: Preliminary result Specimen: Blood from Arm, Right Updated: 11/23/23 1030     Blood Culture No growth at 4 days    Narrative:      Less than seven (7) mL's of blood was collected.  Insufficient quantity may yield false negative results.    Blood Culture - Blood, Arm, Left [[id number]]  (Normal) Collected: 11/19/23 0935    Lab Status: Preliminary result Specimen: Blood from Arm, Left Updated: 11/23/23 0945     Blood Culture No growth at 4 days            US Abdomen Limited    Result Date: 11/20/2023  Impression: Impression:  1.Patchy edema in the pancreatic parenchyma as seen on CT. 2.No evidence of cholelithiasis or acute cholecystitis. 3.Common bile duct dilatation, unchanged from CTA earlier today and likely related to inflammatory changes in the distal common bile duct. Electronically Signed: Lyndon Wahl MD  11/20/2023 12:12 AM EST  Workstation ID: [id number]    CT Abdomen Pelvis With Contrast    Result Date: 11/19/2023  Impression: Impression: Acute uncomplicated pancreatitis. Electronically Signed: Aristeo Byers MD  11/19/2023 10:00 AM EST  Workstation ID: [id number]                 Labs Pending at Discharge:  Pending Labs       Order Current Status    Blood Culture - Blood, Arm, Left Preliminary result    Blood Culture - Blood, Arm, Right Preliminary result            Procedures Performed           Consults:   Consults       Date and Time Order Name Status Description    11/19/2023  1:25 PM Inpatient Gastroenterology Consult Completed               Discharge Details        Discharge Medications        New Medications        Instructions Start Date   ondansetron 4 MG tablet  Commonly known as: ZOFRAN   4 mg, Oral, Every 6 Hours PRN      oxyCODONE-acetaminophen 5-325 MG per tablet  Commonly known as: Percocet   1 tablet, Oral, Every 6 Hours PRN      pantoprazole 40 MG EC tablet  Commonly known as: PROTONIX   40 mg, Oral, Every Early Morning      polyethylene glycol 17 g packet  Commonly known as: MIRALAX   17 g, Oral, Daily PRN      sennosides-docusate 8.6-50 MG per tablet  Commonly known as: PERICOLACE   2 tablets, Oral, 2 Times Daily               No Known Allergies      Discharge Disposition:   Home or Self Care    Diet:  Hospital:  Diet Order   Procedures    Diet: Gastrointestinal Diets; Fat-Restricted; Texture: Regular Texture (IDDSI 7); Fluid Consistency: Thin (IDDSI 0)         Discharge Activity:   Activity Instructions       Gradually Increase Activity Until at Pre-Hospitalization Level                CODE STATUS:  Code  Status and Medical Interventions:   Ordered at: 11/20/23 1043     Code Status (Patient has no pulse and is not breathing):    CPR (Attempt to Resuscitate)     Medical Interventions (Patient has pulse or is breathing):    Full Support     I have utilized all available, immediate resources to obtain, update, or review the patient's current medications including all prescriptions, over-the-counter products, herbals, cannabis/cannabidiol products, and vitamin.mineral/dietary (nutritional) supplements.      Code Status (Patient has no pulse and is not breathing): CPR (Attempt to Resuscitate)  Medical Interventions (Patient has pulse or is breathing): Full Support    Next of kin of Power of :         Admission Status:  I believe this patient meets admit status.          No future appointments.    Additional Instructions for the Follow-ups that You Need to Schedule       Discharge Follow-up with PCP   As directed       Currently Documented PCP:    Provider, No Known    PCP Phone Number:    None     Follow Up Details: 1 week                Time spent on Discharge including face to face service:  34 minutes        Signature: Electronically signed by Luis Daniel Rocha MD, 11/24/23, 9:12 AM EST.

## 2023-11-25 ENCOUNTER — READMISSION MANAGEMENT (OUTPATIENT)
Dept: CALL CENTER | Facility: HOSPITAL | Age: 43
End: 2023-11-25
Payer: COMMERCIAL

## 2023-11-25 NOTE — OUTREACH NOTE
Prep Survey      Flowsheet Row Responses   [facility] facility patient discharged from? [facility]   Is LACE score < 7 ? No   Eligibility Readm Mgmt   Discharge diagnosis Pancreatitis   Does the patient have one of the following disease processes/diagnoses(primary or secondary)? Other   Does the patient have Home health ordered? No   Is there a DME ordered? No   Prep survey completed? Yes            Daisha JACOBS - Registered Nurse

## 2023-11-29 ENCOUNTER — READMISSION MANAGEMENT (OUTPATIENT)
Dept: CALL CENTER | Facility: HOSPITAL | Age: 43
End: 2023-11-29
Payer: COMMERCIAL

## 2023-11-29 NOTE — OUTREACH NOTE
Medical Week 1 Survey      Flowsheet Row Responses   [facility] patient discharged from? [facility]   Does the patient have one of the following disease processes/diagnoses(primary or secondary)? Other   Week 1 attempt successful? Yes   Call start time 0854   Call end time 0856   Discharge diagnosis Pancreatitis   Meds reviewed with patient/caregiver? Yes   Is the patient having any side effects they believe may be caused by any medication additions or changes? No   Does the patient have all medications ordered at discharge? Yes   Is the patient taking all medications as directed (includes completed medication regime)? Yes   Does the patient have an appointment with their PCP within 7 days of discharge? N/A   Has home health visited the patient within 72 hours of discharge? N/A   Psychosocial issues? No   Did the patient receive a copy of their discharge instructions? Yes   Nursing interventions Reviewed instructions with patient   What is the patient's perception of their health status since discharge? Same  [He has an appointment at the GI office on 12/7/23]   Is the patient/caregiver able to teach back signs and symptoms related to disease process for when to call PCP? Yes   Is the patient/caregiver able to teach back signs and symptoms related to disease process for when to call 911? Yes   Is the patient/caregiver able to teach back the hierarchy of who to call/visit for symptoms/problems? PCP, Specialist, Home health nurse, Urgent Care, ED, 911 Yes   Week 1 call completed? Yes   Call end time 0856            Irene Ngo Licensed Nurse

## 2023-11-30 ENCOUNTER — HOSPITAL ENCOUNTER (INPATIENT)
Facility: HOSPITAL | Age: 43
LOS: 18 days | Discharge: HOME | End: 2023-12-19
Attending: EMERGENCY MEDICINE | Admitting: HOSPITALIST
Payer: COMMERCIAL

## 2023-11-30 ENCOUNTER — APPOINTMENT (OUTPATIENT)
Dept: CT IMAGING | Facility: HOSPITAL | Age: 43
End: 2023-11-30
Payer: COMMERCIAL

## 2023-11-30 DIAGNOSIS — K85.20 ALCOHOL-INDUCED ACUTE PANCREATITIS, UNSPECIFIED COMPLICATION STATUS: ICD-10-CM

## 2023-11-30 DIAGNOSIS — K85.90 ACUTE PANCREATITIS, UNSPECIFIED COMPLICATION STATUS, UNSPECIFIED PANCREATITIS TYPE: Primary | ICD-10-CM

## 2023-11-30 DIAGNOSIS — R63.30 FEEDING DIFFICULTIES: ICD-10-CM

## 2023-11-30 DIAGNOSIS — N17.9 AKI (ACUTE KIDNEY INJURY): ICD-10-CM

## 2023-11-30 DIAGNOSIS — R10.12 LEFT UPPER QUADRANT ABDOMINAL PAIN: ICD-10-CM

## 2023-11-30 LAB
ALBUMIN SERPL-MCNC: 4.6 G/DL (ref 3.5–5.2)
ALBUMIN/GLOB SERPL: 1.4 G/DL
ALP SERPL-CCNC: 139 U/L (ref 39–117)
ALT SERPL W P-5'-P-CCNC: 31 U/L (ref 1–41)
ANION GAP SERPL CALCULATED.3IONS-SCNC: 14 MMOL/L (ref 5–15)
AST SERPL-CCNC: 14 U/L (ref 1–40)
BASOPHILS # BLD AUTO: 0.1 10*3/MM3 (ref 0–0.2)
BASOPHILS NFR BLD AUTO: 0.5 % (ref 0–1.5)
BILIRUB SERPL-MCNC: 0.8 MG/DL (ref 0–1.2)
BILIRUB UR QL STRIP: ABNORMAL
BUN SERPL-MCNC: 24 MG/DL (ref 6–20)
BUN/CREAT SERPL: 13 (ref 7–25)
CALCIUM SPEC-SCNC: 10.3 MG/DL (ref 8.6–10.5)
CHLORIDE SERPL-SCNC: 101 MMOL/L (ref 98–107)
CLARITY UR: ABNORMAL
CO2 SERPL-SCNC: 27 MMOL/L (ref 22–29)
COLOR UR: ABNORMAL
CREAT SERPL-MCNC: 1.85 MG/DL (ref 0.76–1.27)
D-LACTATE SERPL-SCNC: 0.7 MMOL/L (ref 0.3–2)
DEPRECATED RDW RBC AUTO: 38.9 FL (ref 37–54)
EGFRCR SERPLBLD CKD-EPI 2021: 46.1 ML/MIN/1.73
EOSINOPHIL # BLD AUTO: 0.1 10*3/MM3 (ref 0–0.4)
EOSINOPHIL NFR BLD AUTO: 0.7 % (ref 0.3–6.2)
ERYTHROCYTE [DISTWIDTH] IN BLOOD BY AUTOMATED COUNT: 12.8 % (ref 12.3–15.4)
GLOBULIN UR ELPH-MCNC: 3.3 GM/DL
GLUCOSE SERPL-MCNC: 134 MG/DL (ref 65–99)
GLUCOSE UR STRIP-MCNC: NEGATIVE MG/DL
HCT VFR BLD AUTO: 48.6 % (ref 37.5–51)
HGB BLD-MCNC: 16.6 G/DL (ref 13–17.7)
HGB UR QL STRIP.AUTO: NEGATIVE
KETONES UR QL STRIP: ABNORMAL
LEUKOCYTE ESTERASE UR QL STRIP.AUTO: ABNORMAL
LIPASE SERPL-CCNC: 2138 U/L (ref 13–60)
LYMPHOCYTES # BLD AUTO: 1.3 10*3/MM3 (ref 0.7–3.1)
LYMPHOCYTES NFR BLD AUTO: 9 % (ref 19.6–45.3)
MCH RBC QN AUTO: 28.1 PG (ref 26.6–33)
MCHC RBC AUTO-ENTMCNC: 34.1 G/DL (ref 31.5–35.7)
MCV RBC AUTO: 82.3 FL (ref 79–97)
MONOCYTES # BLD AUTO: 0.8 10*3/MM3 (ref 0.1–0.9)
MONOCYTES NFR BLD AUTO: 5.8 % (ref 5–12)
NEUTROPHILS NFR BLD AUTO: 12 10*3/MM3 (ref 1.7–7)
NEUTROPHILS NFR BLD AUTO: 84 % (ref 42.7–76)
NITRITE UR QL STRIP: POSITIVE
NRBC BLD AUTO-RTO: 0 /100 WBC (ref 0–0.2)
PH UR STRIP.AUTO: <=5 [PH] (ref 5–8)
PLATELET # BLD AUTO: 609 10*3/MM3 (ref 140–450)
PMV BLD AUTO: 7.7 FL (ref 6–12)
POTASSIUM SERPL-SCNC: 4.3 MMOL/L (ref 3.5–5.2)
PROT SERPL-MCNC: 7.9 G/DL (ref 6–8.5)
PROT UR QL STRIP: ABNORMAL
RBC # BLD AUTO: 5.9 10*6/MM3 (ref 4.14–5.8)
SODIUM SERPL-SCNC: 142 MMOL/L (ref 136–145)
SP GR UR STRIP: 1.04 (ref 1–1.03)
UROBILINOGEN UR QL STRIP: ABNORMAL
WBC NRBC COR # BLD AUTO: 14.3 10*3/MM3 (ref 3.4–10.8)

## 2023-11-30 PROCEDURE — 81001 URINALYSIS AUTO W/SCOPE: CPT | Performed by: PHYSICIAN ASSISTANT

## 2023-11-30 PROCEDURE — 83036 HEMOGLOBIN GLYCOSYLATED A1C: CPT | Performed by: HOSPITALIST

## 2023-11-30 PROCEDURE — 80053 COMPREHEN METABOLIC PANEL: CPT | Performed by: PHYSICIAN ASSISTANT

## 2023-11-30 PROCEDURE — 82077 ASSAY SPEC XCP UR&BREATH IA: CPT | Performed by: PHYSICIAN ASSISTANT

## 2023-11-30 PROCEDURE — 99285 EMERGENCY DEPT VISIT HI MDM: CPT

## 2023-11-30 PROCEDURE — 85025 COMPLETE CBC W/AUTO DIFF WBC: CPT | Performed by: PHYSICIAN ASSISTANT

## 2023-11-30 PROCEDURE — 25010000002 MORPHINE PER 10 MG: Performed by: PHYSICIAN ASSISTANT

## 2023-11-30 PROCEDURE — 83690 ASSAY OF LIPASE: CPT | Performed by: PHYSICIAN ASSISTANT

## 2023-11-30 PROCEDURE — 74176 CT ABD & PELVIS W/O CONTRAST: CPT

## 2023-11-30 PROCEDURE — 83605 ASSAY OF LACTIC ACID: CPT

## 2023-11-30 PROCEDURE — 25010000002 ONDANSETRON PER 1 MG: Performed by: PHYSICIAN ASSISTANT

## 2023-11-30 RX ORDER — ONDANSETRON 2 MG/ML
4 INJECTION INTRAMUSCULAR; INTRAVENOUS ONCE
Status: COMPLETED | OUTPATIENT
Start: 2023-11-30 | End: 2023-11-30

## 2023-11-30 RX ORDER — SODIUM CHLORIDE 0.9 % (FLUSH) 0.9 %
10 SYRINGE (ML) INJECTION AS NEEDED
Status: DISCONTINUED | OUTPATIENT
Start: 2023-11-30 | End: 2023-12-19 | Stop reason: HOSPADM

## 2023-11-30 RX ADMIN — MORPHINE SULFATE 4 MG: 4 INJECTION, SOLUTION INTRAMUSCULAR; INTRAVENOUS at 23:40

## 2023-11-30 RX ADMIN — ONDANSETRON 4 MG: 2 INJECTION INTRAMUSCULAR; INTRAVENOUS at 23:40

## 2023-11-30 NOTE — Clinical Note
Level of Care: Med/Surg [1]   Admitting Physician: STEFANI HERNANDEZ [[id number]]   Attending Physician: STEFANI HERNANDEZ [[id number]]   Rhombic Flap Text: The defect edges were debeveled with a #15 scalpel blade.  Given the location of the defect and the proximity to free margins a rhombic flap was deemed most appropriate.  Using a sterile surgical marker, an appropriate rhombic flap was drawn incorporating the defect.    The area thus outlined was incised deep to adipose tissue with a #15 scalpel blade.  The skin margins were undermined to an appropriate distance in all directions utilizing iris scissors.

## 2023-12-01 ENCOUNTER — READMISSION MANAGEMENT (OUTPATIENT)
Dept: CALL CENTER | Facility: HOSPITAL | Age: 43
End: 2023-12-01
Payer: COMMERCIAL

## 2023-12-01 PROBLEM — K85.90 ACUTE PANCREATITIS: Status: ACTIVE | Noted: 2023-12-01

## 2023-12-01 LAB
ALBUMIN SERPL-MCNC: 3.7 G/DL (ref 3.5–5.2)
ALBUMIN/GLOB SERPL: 1.5 G/DL
ALP SERPL-CCNC: 104 U/L (ref 39–117)
ALT SERPL W P-5'-P-CCNC: 22 U/L (ref 1–41)
ANION GAP SERPL CALCULATED.3IONS-SCNC: 11 MMOL/L (ref 5–15)
AST SERPL-CCNC: 12 U/L (ref 1–40)
BACTERIA UR QL AUTO: ABNORMAL /HPF
BILIRUB SERPL-MCNC: 0.5 MG/DL (ref 0–1.2)
BUN SERPL-MCNC: 23 MG/DL (ref 6–20)
BUN/CREAT SERPL: 13.7 (ref 7–25)
CALCIUM SPEC-SCNC: 8.9 MG/DL (ref 8.6–10.5)
CHLORIDE SERPL-SCNC: 104 MMOL/L (ref 98–107)
CHOLEST SERPL-MCNC: 130 MG/DL (ref 0–200)
CO2 SERPL-SCNC: 25 MMOL/L (ref 22–29)
CREAT SERPL-MCNC: 1.68 MG/DL (ref 0.76–1.27)
DEPRECATED RDW RBC AUTO: 39.4 FL (ref 37–54)
EGFRCR SERPLBLD CKD-EPI 2021: 51.7 ML/MIN/1.73
ERYTHROCYTE [DISTWIDTH] IN BLOOD BY AUTOMATED COUNT: 12.7 % (ref 12.3–15.4)
ETHANOL UR QL: <0.01 %
GLOBULIN UR ELPH-MCNC: 2.5 GM/DL
GLUCOSE SERPL-MCNC: 133 MG/DL (ref 65–99)
HBA1C MFR BLD: 5.1 % (ref 4.8–5.6)
HCT VFR BLD AUTO: 39.3 % (ref 37.5–51)
HDLC SERPL-MCNC: 24 MG/DL (ref 40–60)
HGB BLD-MCNC: 13.3 G/DL (ref 13–17.7)
HOLD SPECIMEN: NORMAL
HOLD SPECIMEN: NORMAL
HYALINE CASTS UR QL AUTO: ABNORMAL /LPF
LDLC SERPL CALC-MCNC: 83 MG/DL (ref 0–100)
LDLC/HDLC SERPL: 3.38 {RATIO}
LIPASE SERPL-CCNC: 1975 U/L (ref 13–60)
MCH RBC QN AUTO: 28.2 PG (ref 26.6–33)
MCHC RBC AUTO-ENTMCNC: 34 G/DL (ref 31.5–35.7)
MCV RBC AUTO: 82.9 FL (ref 79–97)
MUCOUS THREADS URNS QL MICRO: ABNORMAL /HPF
PLATELET # BLD AUTO: 437 10*3/MM3 (ref 140–450)
PMV BLD AUTO: 7.8 FL (ref 6–12)
POTASSIUM SERPL-SCNC: 4.4 MMOL/L (ref 3.5–5.2)
PROT SERPL-MCNC: 6.2 G/DL (ref 6–8.5)
RBC # BLD AUTO: 4.73 10*6/MM3 (ref 4.14–5.8)
RBC # UR STRIP: ABNORMAL /HPF
REF LAB TEST METHOD: ABNORMAL
SODIUM SERPL-SCNC: 140 MMOL/L (ref 136–145)
SQUAMOUS #/AREA URNS HPF: ABNORMAL /HPF
TRIGL SERPL-MCNC: 124 MG/DL (ref 0–150)
VLDLC SERPL-MCNC: 23 MG/DL (ref 5–40)
WBC # UR STRIP: ABNORMAL /HPF
WBC NRBC COR # BLD AUTO: 10.3 10*3/MM3 (ref 3.4–10.8)
WHOLE BLOOD HOLD COAG: NORMAL
WHOLE BLOOD HOLD SPECIMEN: NORMAL

## 2023-12-01 PROCEDURE — 25810000003 LACTATED RINGERS PER 1000 ML: Performed by: HOSPITALIST

## 2023-12-01 PROCEDURE — 80053 COMPREHEN METABOLIC PANEL: CPT | Performed by: HOSPITALIST

## 2023-12-01 PROCEDURE — 25010000002 HYDROMORPHONE 1 MG/ML SOLUTION: Performed by: PHYSICIAN ASSISTANT

## 2023-12-01 PROCEDURE — 25010000002 CEFTRIAXONE PER 250 MG: Performed by: HOSPITALIST

## 2023-12-01 PROCEDURE — 80061 LIPID PANEL: CPT | Performed by: STUDENT IN AN ORGANIZED HEALTH CARE EDUCATION/TRAINING PROGRAM

## 2023-12-01 PROCEDURE — 25010000002 HYDROMORPHONE 1 MG/ML SOLUTION: Performed by: STUDENT IN AN ORGANIZED HEALTH CARE EDUCATION/TRAINING PROGRAM

## 2023-12-01 PROCEDURE — 25810000003 LACTATED RINGERS PER 1000 ML: Performed by: STUDENT IN AN ORGANIZED HEALTH CARE EDUCATION/TRAINING PROGRAM

## 2023-12-01 PROCEDURE — 25010000002 MORPHINE PER 10 MG: Performed by: HOSPITALIST

## 2023-12-01 PROCEDURE — 25010000002 ENOXAPARIN PER 10 MG: Performed by: HOSPITALIST

## 2023-12-01 PROCEDURE — 25810000003 SODIUM CHLORIDE 0.9 % SOLUTION: Performed by: PHYSICIAN ASSISTANT

## 2023-12-01 PROCEDURE — 25010000002 HYDROMORPHONE 1 MG/ML SOLUTION: Performed by: HOSPITALIST

## 2023-12-01 PROCEDURE — 83690 ASSAY OF LIPASE: CPT | Performed by: HOSPITALIST

## 2023-12-01 PROCEDURE — 85027 COMPLETE CBC AUTOMATED: CPT | Performed by: HOSPITALIST

## 2023-12-01 RX ORDER — PANTOPRAZOLE SODIUM 40 MG/10ML
40 INJECTION, POWDER, LYOPHILIZED, FOR SOLUTION INTRAVENOUS
Status: DISCONTINUED | OUTPATIENT
Start: 2023-12-01 | End: 2023-12-07

## 2023-12-01 RX ORDER — BISACODYL 5 MG/1
5 TABLET, DELAYED RELEASE ORAL DAILY PRN
Status: DISCONTINUED | OUTPATIENT
Start: 2023-12-01 | End: 2023-12-05

## 2023-12-01 RX ORDER — POLYETHYLENE GLYCOL 3350 17 G/17G
17 POWDER, FOR SOLUTION ORAL DAILY PRN
Status: DISCONTINUED | OUTPATIENT
Start: 2023-12-01 | End: 2023-12-05

## 2023-12-01 RX ORDER — SODIUM CHLORIDE 0.9 % (FLUSH) 0.9 %
10 SYRINGE (ML) INJECTION EVERY 12 HOURS SCHEDULED
Status: DISCONTINUED | OUTPATIENT
Start: 2023-12-01 | End: 2023-12-19 | Stop reason: HOSPADM

## 2023-12-01 RX ORDER — OXYCODONE HYDROCHLORIDE 5 MG/1
5 TABLET ORAL EVERY 4 HOURS PRN
Status: DISCONTINUED | OUTPATIENT
Start: 2023-12-01 | End: 2023-12-05

## 2023-12-01 RX ORDER — SODIUM CHLORIDE 9 MG/ML
40 INJECTION, SOLUTION INTRAVENOUS AS NEEDED
Status: DISCONTINUED | OUTPATIENT
Start: 2023-12-01 | End: 2023-12-19 | Stop reason: HOSPADM

## 2023-12-01 RX ORDER — BISACODYL 10 MG
10 SUPPOSITORY, RECTAL RECTAL DAILY PRN
Status: DISCONTINUED | OUTPATIENT
Start: 2023-12-01 | End: 2023-12-05

## 2023-12-01 RX ORDER — PROCHLORPERAZINE EDISYLATE 5 MG/ML
5 INJECTION INTRAMUSCULAR; INTRAVENOUS EVERY 6 HOURS PRN
Status: DISCONTINUED | OUTPATIENT
Start: 2023-12-01 | End: 2023-12-11

## 2023-12-01 RX ORDER — ENOXAPARIN SODIUM 100 MG/ML
40 INJECTION SUBCUTANEOUS EVERY 24 HOURS
Status: DISCONTINUED | OUTPATIENT
Start: 2023-12-01 | End: 2023-12-19 | Stop reason: HOSPADM

## 2023-12-01 RX ORDER — SODIUM CHLORIDE, SODIUM LACTATE, POTASSIUM CHLORIDE, CALCIUM CHLORIDE 600; 310; 30; 20 MG/100ML; MG/100ML; MG/100ML; MG/100ML
75 INJECTION, SOLUTION INTRAVENOUS CONTINUOUS
Status: DISCONTINUED | OUTPATIENT
Start: 2023-12-01 | End: 2023-12-06

## 2023-12-01 RX ORDER — ONDANSETRON 2 MG/ML
4 INJECTION INTRAMUSCULAR; INTRAVENOUS EVERY 6 HOURS PRN
Status: DISCONTINUED | OUTPATIENT
Start: 2023-12-01 | End: 2023-12-11

## 2023-12-01 RX ORDER — MORPHINE SULFATE 2 MG/ML
2 INJECTION, SOLUTION INTRAMUSCULAR; INTRAVENOUS EVERY 4 HOURS PRN
Status: DISCONTINUED | OUTPATIENT
Start: 2023-12-01 | End: 2023-12-01

## 2023-12-01 RX ORDER — AMOXICILLIN 250 MG
2 CAPSULE ORAL 2 TIMES DAILY
Status: DISCONTINUED | OUTPATIENT
Start: 2023-12-01 | End: 2023-12-05

## 2023-12-01 RX ORDER — SODIUM CHLORIDE 0.9 % (FLUSH) 0.9 %
10 SYRINGE (ML) INJECTION AS NEEDED
Status: DISCONTINUED | OUTPATIENT
Start: 2023-12-01 | End: 2023-12-19 | Stop reason: HOSPADM

## 2023-12-01 RX ADMIN — Medication 10 ML: at 13:40

## 2023-12-01 RX ADMIN — OXYCODONE 5 MG: 5 TABLET ORAL at 19:16

## 2023-12-01 RX ADMIN — HYDROMORPHONE HYDROCHLORIDE 1 MG: 1 INJECTION, SOLUTION INTRAMUSCULAR; INTRAVENOUS; SUBCUTANEOUS at 22:59

## 2023-12-01 RX ADMIN — Medication 10 ML: at 00:54

## 2023-12-01 RX ADMIN — Medication 10 ML: at 10:14

## 2023-12-01 RX ADMIN — Medication 10 ML: at 07:18

## 2023-12-01 RX ADMIN — HYDROMORPHONE HYDROCHLORIDE 1 MG: 1 INJECTION, SOLUTION INTRAMUSCULAR; INTRAVENOUS; SUBCUTANEOUS at 03:35

## 2023-12-01 RX ADMIN — SODIUM CHLORIDE 1000 ML: 9 INJECTION, SOLUTION INTRAVENOUS at 00:18

## 2023-12-01 RX ADMIN — SODIUM CHLORIDE, POTASSIUM CHLORIDE, SODIUM LACTATE AND CALCIUM CHLORIDE 250 ML/HR: 600; 310; 30; 20 INJECTION, SOLUTION INTRAVENOUS at 08:02

## 2023-12-01 RX ADMIN — Medication 10 ML: at 18:11

## 2023-12-01 RX ADMIN — Medication 10 ML: at 20:07

## 2023-12-01 RX ADMIN — OXYCODONE 5 MG: 5 TABLET ORAL at 13:40

## 2023-12-01 RX ADMIN — MORPHINE SULFATE 2 MG: 2 INJECTION, SOLUTION INTRAMUSCULAR; INTRAVENOUS at 02:16

## 2023-12-01 RX ADMIN — ENOXAPARIN SODIUM 40 MG: 100 INJECTION SUBCUTANEOUS at 15:41

## 2023-12-01 RX ADMIN — DOCUSATE SODIUM 50 MG AND SENNOSIDES 8.6 MG 2 TABLET: 8.6; 5 TABLET, FILM COATED ORAL at 09:19

## 2023-12-01 RX ADMIN — Medication 10 ML: at 00:55

## 2023-12-01 RX ADMIN — HYDROMORPHONE HYDROCHLORIDE 1 MG: 1 INJECTION, SOLUTION INTRAMUSCULAR; INTRAVENOUS; SUBCUTANEOUS at 10:13

## 2023-12-01 RX ADMIN — HYDROMORPHONE HYDROCHLORIDE 1 MG: 1 INJECTION, SOLUTION INTRAMUSCULAR; INTRAVENOUS; SUBCUTANEOUS at 00:17

## 2023-12-01 RX ADMIN — SODIUM CHLORIDE, POTASSIUM CHLORIDE, SODIUM LACTATE AND CALCIUM CHLORIDE 250 ML/HR: 600; 310; 30; 20 INJECTION, SOLUTION INTRAVENOUS at 18:11

## 2023-12-01 RX ADMIN — HYDROMORPHONE HYDROCHLORIDE 1 MG: 1 INJECTION, SOLUTION INTRAMUSCULAR; INTRAVENOUS; SUBCUTANEOUS at 14:14

## 2023-12-01 RX ADMIN — Medication 10 ML: at 14:14

## 2023-12-01 RX ADMIN — HYDROMORPHONE HYDROCHLORIDE 1 MG: 1 INJECTION, SOLUTION INTRAMUSCULAR; INTRAVENOUS; SUBCUTANEOUS at 18:11

## 2023-12-01 RX ADMIN — PANTOPRAZOLE SODIUM 40 MG: 40 INJECTION, POWDER, FOR SOLUTION INTRAVENOUS at 13:40

## 2023-12-01 RX ADMIN — SODIUM CHLORIDE 1000 ML: 9 INJECTION, SOLUTION INTRAVENOUS at 00:17

## 2023-12-01 RX ADMIN — SODIUM CHLORIDE, POTASSIUM CHLORIDE, SODIUM LACTATE AND CALCIUM CHLORIDE 150 ML/HR: 600; 310; 30; 20 INJECTION, SOLUTION INTRAVENOUS at 02:55

## 2023-12-01 RX ADMIN — SODIUM CHLORIDE, POTASSIUM CHLORIDE, SODIUM LACTATE AND CALCIUM CHLORIDE 250 ML/HR: 600; 310; 30; 20 INJECTION, SOLUTION INTRAVENOUS at 12:01

## 2023-12-01 RX ADMIN — CEFTRIAXONE 1000 MG: 1 INJECTION, POWDER, FOR SOLUTION INTRAMUSCULAR; INTRAVENOUS at 02:16

## 2023-12-01 RX ADMIN — HYDROMORPHONE HYDROCHLORIDE 1 MG: 1 INJECTION, SOLUTION INTRAMUSCULAR; INTRAVENOUS; SUBCUTANEOUS at 07:16

## 2023-12-01 RX ADMIN — SODIUM CHLORIDE, POTASSIUM CHLORIDE, SODIUM LACTATE AND CALCIUM CHLORIDE 250 ML/HR: 600; 310; 30; 20 INJECTION, SOLUTION INTRAVENOUS at 15:41

## 2023-12-01 NOTE — ED PROVIDER NOTES
Subjective   History of Present Illness    Patient 43-year-old male comes in complaining of left upper quadrant abdominal pain for the past several days.  Patient reports some nausea, vomiting and dry heaving.  Patient states he can only keep little amounts of liquids and bland foods last few days.  Patient denies any alcohol use since his birthday 1 month ago.  Patient reports some chills but no fever.  Patient denies any urinary symptoms.      Review of Systems   Constitutional:  Negative for chills, fatigue and fever.   HENT:  Negative for congestion, sore throat, tinnitus and trouble swallowing.    Eyes:  Negative for photophobia, discharge and visual disturbance.   Respiratory:  Negative for cough and shortness of breath.    Cardiovascular:  Negative for chest pain and leg swelling.   Gastrointestinal:  Positive for abdominal pain and nausea. Negative for blood in stool, diarrhea and vomiting.   Genitourinary:  Negative for dysuria, flank pain and urgency.   Musculoskeletal:  Negative for arthralgias and myalgias.   Skin:  Negative for rash.   Neurological:  Negative for dizziness and headaches.   Psychiatric/Behavioral:  Negative for confusion.        History reviewed. No pertinent past medical history.    No Known Allergies    History reviewed. No pertinent surgical history.    History reviewed. No pertinent family history.    Social History     Socioeconomic History    Marital status: Single   Tobacco Use    Smoking status: Every Day     Packs/day: 0.50     Years: 15.00     Additional pack years: 0.00     Total pack years: 7.50     Types: Cigarettes    Smokeless tobacco: Never   Vaping Use    Vaping Use: Never used   Substance and Sexual Activity    Alcohol use: Not Currently    Drug use: Not Currently    Sexual activity: Defer           Objective   Physical Exam  Vitals and nursing note reviewed.   Constitutional:       General: He is not in acute distress.     Appearance: He is well-developed. He is not  diaphoretic.   HENT:      Head: Normocephalic and atraumatic.      Nose: Nose normal.      Mouth/Throat:      Pharynx: No oropharyngeal exudate.   Eyes:      Extraocular Movements: Extraocular movements intact.      Conjunctiva/sclera: Conjunctivae normal.      Pupils: Pupils are equal, round, and reactive to light.   Cardiovascular:      Rate and Rhythm: Normal rate and regular rhythm.      Heart sounds: Normal heart sounds.      Comments: S1, S2 audible.  Pulmonary:      Effort: Pulmonary effort is normal. No respiratory distress.      Breath sounds: Normal breath sounds. No wheezing.      Comments: On room air.  Abdominal:      General: Bowel sounds are normal. There is no distension.      Palpations: Abdomen is soft.      Tenderness: There is no abdominal tenderness. There is no right CVA tenderness, left CVA tenderness, guarding or rebound. Negative signs include Sagastume's sign.   Musculoskeletal:         General: No tenderness or deformity. Normal range of motion.      Cervical back: Normal range of motion.   Skin:     General: Skin is warm.      Capillary Refill: Capillary refill takes less than 2 seconds.      Findings: No erythema or rash.   Neurological:      Mental Status: He is alert and oriented to person, place, and time.      Cranial Nerves: No cranial nerve deficit.   Psychiatric:         Mood and Affect: Mood normal.         Behavior: Behavior normal.         Procedures           ED Course                                 Labs Reviewed   COMPREHENSIVE METABOLIC PANEL - Abnormal; Notable for the following components:       Result Value    Glucose 134 (*)     BUN 24 (*)     Creatinine 1.85 (*)     Alkaline Phosphatase 139 (*)     eGFR 46.1 (*)     All other components within normal limits    Narrative:     GFR Normal >60  Chronic Kidney Disease <60  Kidney Failure <15     LIPASE - Abnormal; Notable for the following components:    Lipase 2,138 (*)     All other components within normal limits    URINALYSIS W/ CULTURE IF INDICATED - Abnormal; Notable for the following components:    Color, UA Dark Yellow (*)     Appearance, UA Hazy (*)     Specific Gravity, UA 1.038 (*)     Ketones, UA 15 mg/dL (1+) (*)     Bilirubin, UA Moderate (2+) (*)     Protein,  mg/dL (2+) (*)     Leuk Esterase, UA Trace (*)     Nitrite, UA Positive (*)     All other components within normal limits    Narrative:     In absence of clinical symptoms, the presence of pyuria, bacteria, and/or nitrites on the urinalysis result does not correlate with infection.   CBC WITH AUTO DIFFERENTIAL - Abnormal; Notable for the following components:    WBC 14.30 (*)     RBC 5.90 (*)     Platelets 609 (*)     Neutrophil % 84.0 (*)     Lymphocyte % 9.0 (*)     Neutrophils, Absolute 12.00 (*)     All other components within normal limits   POC LACTATE - Normal   RAINBOW DRAW    Narrative:     The following orders were created for panel order [place] Draw.  Procedure                               Abnormality         Status                     ---------                               -----------         ------                     Green Top (Gel)[[id number]]                                  Final result               Lavender Top[[id number]]                                     Final result               Gold Top - SST[[id number]]                                   Final result               Light Blue Top[[id number]]                                   Final result                 Please view results for these tests on the individual orders.   ETHANOL    Narrative:     Plasma Ethanol Clinical Symptoms:    ETOH (%)               Clinical Symptom  .01-.05              No apparent influence  .03-.12              Euphoria, Diminished judgment and attention   .09-.25              Impaired comprehension, Muscle incoordination  .18-.30              Confusion, Staggered gait, Slurred speech  .25-.40              Markedly decreased response to stimuli, unable to  stand or                        walk, vomitting, sleep or stupor  .35-.50              Comatose, Anesthesia, Subnormal body temperature       URINALYSIS, MICROSCOPIC ONLY   POC LACTATE   CBC AND DIFFERENTIAL    Narrative:     The following orders were created for panel order CBC & Differential.  Procedure                               Abnormality         Status                     ---------                               -----------         ------                     CBC Auto Differential[[id number]]        Abnormal            Final result                 Please view results for these tests on the individual orders.   GREEN TOP   LAVENDER TOP   GOLD TOP - SST   LIGHT BLUE TOP                 Medical Decision Making  Problems Addressed:  Acute pancreatitis, unspecified complication status, unspecified pancreatitis type: complicated acute illness or injury  SINGH (acute kidney injury): complicated acute illness or injury  Left upper quadrant abdominal pain: complicated acute illness or injury    Amount and/or Complexity of Data Reviewed  Labs: ordered.  Radiology: ordered.    Risk  Prescription drug management.  Decision regarding hospitalization.      Differential diagnosis: Pancreatitis, small bowel obstruction, not ment to be an all inclusive list.  Chart review: Upon chart review, last discharge summary on 11/24/2023, by Dr. Rocha was seen for SINGH and pancreatitis.    EKG: Not indicated  Imaging:    CT Abdomen Pelvis Without Contrast   Final Result   Impression:   1.Findings consistent with acute pancreatitis with new fluid collections seen within the pancreatic neck and body as described above, likely representing pseudocysts. A small to moderate amount of free fluid is present within the pelvis, increased as    compared to the previous study, likely related to pancreatitis and tracking inflammatory change.   2.Ancillary findings as described above.            Electronically Signed: Sari Christine MD     11/30/2023  "11:32 PM EST     Workstation ID: [id number]        Labs:  Lab work independently interpreted by myself shows initial lactic normal.  Ethanol level negative.  1+ ketones in urine and nitrite positive, microscopic urine pending.  Lipase elevated 2100.  CBC shows white count of 14.  Serum creatinine of 1.8 significantly up from previous.     Vitals:  /67   Pulse 104   Temp 97.6 °F (36.4 °C) (Oral)   Resp 18   Ht 182.9 cm (72\")   Wt 61.2 kg (134 lb 14.7 oz)   SpO2 96%   BMI 18.30 kg/m²    Medications given:    Medications   sodium chloride 0.9 % flush 10 mL (has no administration in time range)   sodium chloride 0.9 % bolus 1,000 mL (1,000 mL Intravenous New Bag 12/1/23 0017)   sodium chloride 0.9 % bolus 1,000 mL (1,000 mL Intravenous New Bag 12/1/23 0018)   ondansetron (ZOFRAN) injection 4 mg (4 mg Intravenous Given 11/30/23 2340)   morphine injection 4 mg (4 mg Intravenous Given 11/30/23 2340)   HYDROmorphone (DILAUDID) injection 1 mg (1 mg Intravenous Given 12/1/23 0017)       Procedures:  not indicated  MDM: Patient is a 43-year-old male comes in complaining of abdominal pain. Lab work independently interpreted by myself shows initial lactic normal.  Ethanol level negative.  1+ ketones in urine and nitrite positive, microscopic urine pending.  Lipase elevated 2100.  CBC shows white count of 14.  Serum creatinine of 1.8 significantly up from previous.  Patient given 2 L normal saline bolus in ED.  CT shows findings of pancreatitis and pseudocyst.  Patient denies any alcohol use or history of elevated triglycerides.  I suspect patient's leukocytosis is reactive from vomiting and dry heaving.  Spoke with Dr. Purvis, who excepted patient behalf of hospitalist team for admission to hospital further workup and treatment.  Plan discussed with patient and is agreeable with plan.      Final diagnoses:   Acute pancreatitis, unspecified complication status, unspecified pancreatitis type   SINGH (acute kidney injury) "   Left upper quadrant abdominal pain       ED Disposition  ED Disposition       ED Disposition   Decision to Admit    Condition   --    Comment   Level of Care: Med/Surg [1]   Admitting Physician: STEFANI HERNANDEZ [[id number]]   Attending Physician: STEFANI HERNANDEZ [[id number]]                 No follow-up provider specified.       Medication List      No changes were made to your prescriptions during this visit.            Sarabjit Harris PA  12/01/23 0032

## 2023-12-01 NOTE — PLAN OF CARE
Goal Outcome Evaluation:   A/O x4 C/O ABD epigastric pains with PRN's noted effective. No nausea/vomiting noted this am. On IVF's and clear liquid diet.

## 2023-12-01 NOTE — OUTREACH NOTE
Medical Week 2 Survey      Flowsheet Row Responses   [facility] facility patient discharged from? [facility]   Does the patient have one of the following disease processes/diagnoses(primary or secondary)? Other   Week 2 attempt successful? No   Unsuccessful attempts Attempt 1   Revoke Readmitted            Sherley OJEDA - Registered Nurse

## 2023-12-01 NOTE — PAYOR COMM NOTE
"Dayron Neville (43 y.o. Male)  1980  [facility] Ref #[id number]   Clinicals per Request    AUTHORIZATION PENDING  PLEASE FORWARD DETERMINATION TO FOLLOWING CONTACT:    JAVIER SAMANO LPN UR  Utilization Review Nurse  [facility]  Direct & confidential phone # [phone]  Fax # [phone]        Date of Birth   1980    Social Security Number       Address   [address] [place] [address]    Home Phone   [phone]    MRN   [id number]       Yazidism   Samaritan    Marital Status   Single                            Admission Date   23    Admission Type   Emergency    Admitting Provider   Maile Purvis MD    Attending Provider   Tyrone Dawson MD    Department, Room/Bed   [facility] OBSERVATION,        Discharge Date       Discharge Disposition       Discharge Destination                                 Attending Provider: Tyrone Dawson MD    Allergies: No Known Allergies    Isolation: None   Infection: None   Code Status: CPR    Ht: 182.9 cm (72.01\")   Wt: 55.9 kg (123 lb 3.8 oz)    Admission Cmt: None   Principal Problem: Acute pancreatitis [K85.90]                   Active Insurance as of 2023       Primary Coverage       Payor Plan Insurance Group Employer/Plan Group    HUMANA HUMANA [id number]       Payor Plan Address Payor Plan Phone Number Payor Plan Fax Number Effective Dates    [address] [address] [phone]  10/22/2023 - None Entered    [facility] [id number]         Subscriber Name Subscriber Birth Date Member ID       DAYRON NEVILLE 1980 [id number]                     Emergency Contacts        (Rel.) Home Phone Work Phone Mobile Phone    NANCY JOEL (Mother) -- -- [phone]                 History & Physical        Maile Purvis MD at 23 [address] [place] [address] Medicine Services  History & Physical    Patient Name: Dayron Neville  : 1980  MRN: [id number]  Primary Care Physician:  " Provider, No Known  Date of admission: 11/30/2023  Date and Time of Service: 11/30/2023 at 00:51 EST      Subjective      Chief Complaint: Abdominal pain    History of Present Illness: Dayron Manley is a 43 y.o. male with no significant past medical history who presented to [facility] on 11/30/2023 with abdominal pain. Per patient, he has had abdominal pain for the past few days.  Abdominal pain is located in the epigastric area, and rated as a 10/10 on a pain scale.  Patient also reported nausea and vomiting.  Endorses decreased appetite.  Of note, patient was recently admitted 11/19 and discharged on 11/24 after being treated for acute pancreatitis.  Patient stated that that was his first time being admitted for pancreatitis. Pt used to be a heavy drinker a decade ago. He quit drinking excessively about 10 years ago and only drinks rarely on occasions with the last drink being on his birthday, [birth year].  Denies any fever or chills, shortness of breath, chest pain, weakness or any recent sick contact.    In the ED, vital signs were stable.  Labs remarkable for creatinine 1.85, lipase 2138, WBC 14.3, urinalysis positive for nitrates, leukocytes and proteins.  Alcohol level was negative.  CT abdomen shows acute pancreatitis with new fluid collection seen within the pancreatic neck and body likely representing pseudocyst.      Review of Systems  As stated above  Personal History     History reviewed. No pertinent past medical history.    History reviewed. No pertinent surgical history.    Family History: family history is not on file. Otherwise pertinent FHx was reviewed and not pertinent to current issue.    Social History:  reports that he has been smoking cigarettes. He has a 7.50 pack-year smoking history. He has never used smokeless tobacco. He reports that he does not currently use alcohol. He reports that he does not currently use drugs.    Home Medications:  Prior to Admission Medications        Prescriptions Last Dose Informant Patient Reported? Taking?    indomethacin (INDOCIN) 50 MG capsule   No No    Take 1 capsule by mouth 3 (Three) Times a Day As Needed (pain).    ondansetron (ZOFRAN) 4 MG tablet   No No    Take 1 tablet by mouth Every 6 (Six) Hours As Needed for Nausea or Vomiting.    oxyCODONE-acetaminophen (Percocet) 5-325 MG per tablet   No No    Take 1 tablet by mouth Every 6 (Six) Hours As Needed for Moderate Pain.    pantoprazole (PROTONIX) 40 MG EC tablet   No No    Take 1 tablet by mouth Every Morning.    polyethylene glycol (MIRALAX) 17 g packet   No No    Take 17 g by mouth Daily As Needed (Use if senna-docusate is ineffective).    sennosides-docusate (PERICOLACE) 8.6-50 MG per tablet   No No    Take 2 tablets by mouth 2 (Two) Times a Day.              Allergies:  No Known Allergies    Objective      Vitals:   Temp:  [97.6 °F (36.4 °C)] 97.6 °F (36.4 °C)  Heart Rate:  [104] 104  Resp:  [18] 18  BP: (103)/(67) 103/67  Body mass index is 18.3 kg/m².    Physical Exam  General Appearance: AOO x 4, cooperative, no distress, appropriate for age  Head:  Normocephalic, without obvious abnormality  Eyes:  PERRL, EOM's intact, conjunctivae and cornea clear  Nose:  Nares symmetrical, septum midline, mucosa pink  Throat:  Lips, tongue, and mucosa are moist, pink, and intact  Neck:  Supple; symmetrical, trachea midline, no adenopathy  Back:  Symmetrical, ROM normal, no CVA tenderness  Lungs: Respirations unlabored, no audible wheeze  Heart: Regular rate & rhythm, S1 and S2 normal  Abdomen: Tender to palpation, bowel sounds active all four quadrants  Musculoskeletal: Tone and strength strong and symmetrical, all extremities; no joint pain or edema         Skin/Hair/Nails:  Skin warm, dry and intact, no rashes or abnormal dyspigmentation       Diagnostic Data:  Lab Results (last 24 hours)       Procedure Component Value Units Date/Time    Urinalysis, Microscopic Only - Urine, Clean Catch [[id number]]   (Abnormal) Collected: 11/30/23 2309    Specimen: Urine, Clean Catch Updated: 12/01/23 0037     RBC, UA 3-5 /HPF      WBC, UA 3-5 /HPF      Comment: Urine culture not indicated.        Bacteria, UA Trace /HPF      Squamous Epithelial Cells, UA 7-12 /HPF      Hyaline Casts, UA 7-12 /LPF      Mucus, UA Moderate/2+ /HPF      Methodology Manual Light Microscopy    [place] Draw [[id number]] Collected: 11/30/23 2304    Specimen: Blood Updated: 12/01/23 0015    Narrative:      The following orders were created for panel order [place] Draw.  Procedure                               Abnormality         Status                     ---------                               -----------         ------                     Green Top (Gel)[[id number]]                                  Final result               Lavender Top[[id number]]                                     Final result               Gold Top - SST[[id number]]                                   Final result               Light Blue Top[[id number]]                                   Final result                 Please view results for these tests on the individual orders.    Green Top (Gel) [[id number]] Collected: 11/30/23 2304    Specimen: Blood Updated: 12/01/23 0015     Extra Tube Hold for add-ons.     Comment: Auto resulted.       Lavender Top [[id number]] Collected: 11/30/23 2304    Specimen: Blood Updated: 12/01/23 0015     Extra Tube hold for add-on     Comment: Auto resulted       Gold Top - SST [[id number]] Collected: 11/30/23 2304    Specimen: Blood Updated: 12/01/23 0015     Extra Tube Hold for add-ons.     Comment: Auto resulted.       Light Blue Top [[id number]] Collected: 11/30/23 2304    Specimen: Blood Updated: 12/01/23 0015     Extra Tube Hold for add-ons.     Comment: Auto resulted       Ethanol [[id number]] Collected: 11/30/23 2343    Specimen: Blood Updated: 12/01/23 0004     Ethanol % <0.010 %     Narrative:      Plasma Ethanol Clinical Symptoms:    ETOH (%)                Clinical Symptom  .01-.05              No apparent influence  .03-.12              Euphoria, Diminished judgment and attention   .09-.25              Impaired comprehension, Muscle incoordination  .18-.30              Confusion, Staggered gait, Slurred speech  .25-.40              Markedly decreased response to stimuli, unable to stand or                        walk, vomitting, sleep or stupor  .35-.50              Comatose, Anesthesia, Subnormal body temperature        Urinalysis With Culture If Indicated - Urine, Clean Catch [[id number]]  (Abnormal) Collected: 11/30/23 2309    Specimen: Urine, Clean Catch Updated: 11/30/23 2352     Color, UA Dark Yellow     Appearance, UA Hazy     pH, UA <=5.0     Specific Gravity, UA 1.038     Glucose, UA Negative     Ketones, UA 15 mg/dL (1+)     Bilirubin, UA Moderate (2+)     Comment: Confirmation testing is unavailable.  A serum bilirubin is recommended for further assessment.        Blood, UA Negative     Protein,  mg/dL (2+)     Leuk Esterase, UA Trace     Nitrite, UA Positive     Urobilinogen, UA 1.0 E.U./dL    Narrative:      In absence of clinical symptoms, the presence of pyuria, bacteria, and/or nitrites on the urinalysis result does not correlate with infection.    POC Lactate [[id number]]  (Normal) Collected: 11/30/23 2345    Specimen: Blood Updated: 11/30/23 2347     Lactate 0.7 mmol/L      Comment: Serial Number: [id number]:  [id number]       Lipase [[id number]]  (Abnormal) Collected: 11/30/23 2304    Specimen: Blood Updated: 11/30/23 2341     Lipase 2,138 U/L     Comprehensive Metabolic Panel [[id number]]  (Abnormal) Collected: 11/30/23 2304    Specimen: Blood Updated: 11/30/23 2335     Glucose 134 mg/dL      BUN 24 mg/dL      Creatinine 1.85 mg/dL      Sodium 142 mmol/L      Potassium 4.3 mmol/L      Chloride 101 mmol/L      CO2 27.0 mmol/L      Calcium 10.3 mg/dL      Total Protein 7.9 g/dL      Albumin 4.6 g/dL      ALT (SGPT) 31 U/L      AST  (SGOT) 14 U/L      Alkaline Phosphatase 139 U/L      Total Bilirubin 0.8 mg/dL      Globulin 3.3 gm/dL      A/G Ratio 1.4 g/dL      BUN/Creatinine Ratio 13.0     Anion Gap 14.0 mmol/L      eGFR 46.1 mL/min/1.73     Narrative:      GFR Normal >60  Chronic Kidney Disease <60  Kidney Failure <15      CBC & Differential [[id number]]  (Abnormal) Collected: 11/30/23 2304    Specimen: Blood Updated: 11/30/23 2312    Narrative:      The following orders were created for panel order CBC & Differential.  Procedure                               Abnormality         Status                     ---------                               -----------         ------                     CBC Auto Differential[[id number]]        Abnormal            Final result                 Please view results for these tests on the individual orders.    CBC Auto Differential [[id number]]  (Abnormal) Collected: 11/30/23 2304    Specimen: Blood Updated: 11/30/23 2312     WBC 14.30 10*3/mm3      RBC 5.90 10*6/mm3      Hemoglobin 16.6 g/dL      Hematocrit 48.6 %      MCV 82.3 fL      MCH 28.1 pg      MCHC 34.1 g/dL      RDW 12.8 %      RDW-SD 38.9 fl      MPV 7.7 fL      Platelets 609 10*3/mm3      Neutrophil % 84.0 %      Lymphocyte % 9.0 %      Monocyte % 5.8 %      Eosinophil % 0.7 %      Basophil % 0.5 %      Neutrophils, Absolute 12.00 10*3/mm3      Lymphocytes, Absolute 1.30 10*3/mm3      Monocytes, Absolute 0.80 10*3/mm3      Eosinophils, Absolute 0.10 10*3/mm3      Basophils, Absolute 0.10 10*3/mm3      nRBC 0.0 /100 WBC              Imaging Results (Last 24 Hours)       Procedure Component Value Units Date/Time    CT Abdomen Pelvis Without Contrast [[id number]] Collected: 11/30/23 2328     Updated: 11/30/23 2334    Narrative:      CT ABDOMEN PELVIS WO CONTRAST    Date of Exam: 11/30/2023 11:07 PM EST    Indication: Abdominal pain, acute, nonlocalized.    Comparison: 11/19/2023.    Technique: Axial CT images were obtained of the abdomen and pelvis  without the administration of contrast. Sagittal and coronal reconstructions were performed.  Automated exposure control and iterative reconstruction methods were used.      Findings:  Lung Bases:     The visualized lung bases and lower mediastinal structures are unremarkable.    Limited evaluation due to lack of intravenous contrast. Liver:  Liver is normal in size and CT density. No focal lesions.    Biliary/Gallbladder:    The gallbladder is normal without evidence of radiopaque stones. The biliary tree is nondilated.    Spleen:  Spleen is normal in size and CT density.    Pancreas:    Stranding is seen surrounding the pancreatic head, neck and body. There is enlargement of these areas present. There is a new fluid collection seen within the pancreatic neck and body junction measuring up to 4.3 x 6.0 cm (series 3 image 45). A smaller   fluid collection is seen inferiorly within the pancreatic neck measuring up to 2.5 x 1.1 cm which may be contiguous. Patchy hypodensity seen within the remaining portions of the pancreatic head and neck. No definite additional collection identified.   Prominent peripancreatic nodes are present. A small to moderate amount of free fluid is present within the pelvis, increased as compared to the previous study. Mild stranding is seen within the mesentery.  Kidneys:    Kidneys are normal in size. There are no stones or hydronephrosis.    Adrenals:    Adrenal glands are unremarkable.    Retroperitoneal/Lymph Nodes/Vasculature:    No retroperitoneal adenopathy is identified.    Gastrointestinal/Mesentery:    The bowel loops are non-dilated without wall thickening or mass. The appendix appears within normal limits. No evidence of obstruction. No free air. No mesenteric fluid collections identified. No significant stool burden identified.    Bladder:    The bladder is normal.    Genital:     Unremarkable          Bony Structures:     Visualized bony structures are consistent with the  patient's age.        Impression:      Impression:  1.Findings consistent with acute pancreatitis with new fluid collections seen within the pancreatic neck and body as described above, likely representing pseudocysts. A small to moderate amount of free fluid is present within the pelvis, increased as   compared to the previous study, likely related to pancreatitis and tracking inflammatory change.  2.Ancillary findings as described above.        Electronically Signed: Sari Christine MD    11/30/2023 11:32 PM EST    Workstation ID: [id number]              Assessment & Plan        This is a 43 y.o. male with PMH of    Active and Resolved Problems  Active Hospital Problems    Diagnosis  POA    **Acute pancreatitis [K85.90]  Yes      Resolved Hospital Problems   No resolved problems to display.     Acute pancreatitis  Lipase level 2138  Alcohol level negative  Make patient n.p.o.  Start LR at 150 cc/h  Will manage pain with Dilaudid IV as needed  Will give Zofran and Compazine as needed  Repeat lipase level in the morning  Repeat CBC    Acute kidney injury  Creatinine 1.85  Pt was given indomethacin at the urgent care- Hold for now.  Continue with IV fluids  Repeat BMP    Possible UTI  Leukocytosis  Leukocytosis might be reactive due to nausea and vomiting  Start Rocephin  Follow-up urine culture    DVT prophylaxis:  Medical DVT prophylaxis orders are present.      CODE STATUS:    Code Status (Patient has no pulse and is not breathing): CPR (Attempt to Resuscitate)  Medical Interventions (Patient has pulse or is breathing): Full Support        Admission Status:  I believe this patient meets inpatient status.      I discussed the patient's findings and my recommendations with patient.      Signature:     This document has been electronically signed by Maile Purvis MD on December 1, 2023 00:51 [facility] Hospitalist Team      Electronically signed by Maile Purvis MD at 12/01/23 0252          Emergency  Department Notes        Sarabjit Harris PA at 11/30/23 0268          Subjective   History of Present Illness    Patient 43-year-old male comes in complaining of left upper quadrant abdominal pain for the past several days.  Patient reports some nausea, vomiting and dry heaving.  Patient states he can only keep little amounts of liquids and bland foods last few days.  Patient denies any alcohol use since his birthday 1 month ago.  Patient reports some chills but no fever.  Patient denies any urinary symptoms.      Review of Systems   Constitutional:  Negative for chills, fatigue and fever.   HENT:  Negative for congestion, sore throat, tinnitus and trouble swallowing.    Eyes:  Negative for photophobia, discharge and visual disturbance.   Respiratory:  Negative for cough and shortness of breath.    Cardiovascular:  Negative for chest pain and leg swelling.   Gastrointestinal:  Positive for abdominal pain and nausea. Negative for blood in stool, diarrhea and vomiting.   Genitourinary:  Negative for dysuria, flank pain and urgency.   Musculoskeletal:  Negative for arthralgias and myalgias.   Skin:  Negative for rash.   Neurological:  Negative for dizziness and headaches.   Psychiatric/Behavioral:  Negative for confusion.        History reviewed. No pertinent past medical history.    No Known Allergies    History reviewed. No pertinent surgical history.    History reviewed. No pertinent family history.    Social History     Socioeconomic History    Marital status: Single   Tobacco Use    Smoking status: Every Day     Packs/day: 0.50     Years: 15.00     Additional pack years: 0.00     Total pack years: 7.50     Types: Cigarettes    Smokeless tobacco: Never   Vaping Use    Vaping Use: Never used   Substance and Sexual Activity    Alcohol use: Not Currently    Drug use: Not Currently    Sexual activity: Defer           Objective   Physical Exam  Vitals and nursing note reviewed.   Constitutional:       General: He is not in  acute distress.     Appearance: He is well-developed. He is not diaphoretic.   HENT:      Head: Normocephalic and atraumatic.      Nose: Nose normal.      Mouth/Throat:      Pharynx: No oropharyngeal exudate.   Eyes:      Extraocular Movements: Extraocular movements intact.      Conjunctiva/sclera: Conjunctivae normal.      Pupils: Pupils are equal, round, and reactive to light.   Cardiovascular:      Rate and Rhythm: Normal rate and regular rhythm.      Heart sounds: Normal heart sounds.      Comments: S1, S2 audible.  Pulmonary:      Effort: Pulmonary effort is normal. No respiratory distress.      Breath sounds: Normal breath sounds. No wheezing.      Comments: On room air.  Abdominal:      General: Bowel sounds are normal. There is no distension.      Palpations: Abdomen is soft.      Tenderness: There is no abdominal tenderness. There is no right CVA tenderness, left CVA tenderness, guarding or rebound. Negative signs include Sagastume's sign.   Musculoskeletal:         General: No tenderness or deformity. Normal range of motion.      Cervical back: Normal range of motion.   Skin:     General: Skin is warm.      Capillary Refill: Capillary refill takes less than 2 seconds.      Findings: No erythema or rash.   Neurological:      Mental Status: He is alert and oriented to person, place, and time.      Cranial Nerves: No cranial nerve deficit.   Psychiatric:         Mood and Affect: Mood normal.         Behavior: Behavior normal.         Procedures          ED Course                                 Labs Reviewed   COMPREHENSIVE METABOLIC PANEL - Abnormal; Notable for the following components:       Result Value    Glucose 134 (*)     BUN 24 (*)     Creatinine 1.85 (*)     Alkaline Phosphatase 139 (*)     eGFR 46.1 (*)     All other components within normal limits    Narrative:     GFR Normal >60  Chronic Kidney Disease <60  Kidney Failure <15     LIPASE - Abnormal; Notable for the following components:    Lipase  2,138 (*)     All other components within normal limits   URINALYSIS W/ CULTURE IF INDICATED - Abnormal; Notable for the following components:    Color, UA Dark Yellow (*)     Appearance, UA Hazy (*)     Specific Gravity, UA 1.038 (*)     Ketones, UA 15 mg/dL (1+) (*)     Bilirubin, UA Moderate (2+) (*)     Protein,  mg/dL (2+) (*)     Leuk Esterase, UA Trace (*)     Nitrite, UA Positive (*)     All other components within normal limits    Narrative:     In absence of clinical symptoms, the presence of pyuria, bacteria, and/or nitrites on the urinalysis result does not correlate with infection.   CBC WITH AUTO DIFFERENTIAL - Abnormal; Notable for the following components:    WBC 14.30 (*)     RBC 5.90 (*)     Platelets 609 (*)     Neutrophil % 84.0 (*)     Lymphocyte % 9.0 (*)     Neutrophils, Absolute 12.00 (*)     All other components within normal limits   POC LACTATE - Normal   [place] DRAW    Narrative:     The following orders were created for panel order [place] Draw.  Procedure                               Abnormality         Status                     ---------                               -----------         ------                     Green Top (Gel)[[id number]]                                  Final result               Lavender Top[[id number]]                                     Final result               Gold Top - SST[[id number]]                                   Final result               Light Blue Top[[id number]]                                   Final result                 Please view results for these tests on the individual orders.   ETHANOL    Narrative:     Plasma Ethanol Clinical Symptoms:    ETOH (%)               Clinical Symptom  .01-.05              No apparent influence  .03-.12              Euphoria, Diminished judgment and attention   .09-.25              Impaired comprehension, Muscle incoordination  .18-.30              Confusion, Staggered gait, Slurred speech  .25-.40               Markedly decreased response to stimuli, unable to stand or                        walk, vomitting, sleep or stupor  .35-.50              Comatose, Anesthesia, Subnormal body temperature       URINALYSIS, MICROSCOPIC ONLY   POC LACTATE   CBC AND DIFFERENTIAL    Narrative:     The following orders were created for panel order CBC & Differential.  Procedure                               Abnormality         Status                     ---------                               -----------         ------                     CBC Auto Differential[[id number]]        Abnormal            Final result                 Please view results for these tests on the individual orders.   GREEN TOP   LAVENDER TOP   GOLD TOP - SST   LIGHT BLUE TOP                 Medical Decision Making  Problems Addressed:  Acute pancreatitis, unspecified complication status, unspecified pancreatitis type: complicated acute illness or injury  SINGH (acute kidney injury): complicated acute illness or injury  Left upper quadrant abdominal pain: complicated acute illness or injury    Amount and/or Complexity of Data Reviewed  Labs: ordered.  Radiology: ordered.    Risk  Prescription drug management.  Decision regarding hospitalization.      Differential diagnosis: Pancreatitis, small bowel obstruction, not ment to be an all inclusive list.  Chart review: Upon chart review, last discharge summary on 11/24/2023, by Dr. Rocha was seen for SINGH and pancreatitis.    EKG: Not indicated  Imaging:    CT Abdomen Pelvis Without Contrast   Final Result   Impression:   1.Findings consistent with acute pancreatitis with new fluid collections seen within the pancreatic neck and body as described above, likely representing pseudocysts. A small to moderate amount of free fluid is present within the pelvis, increased as    compared to the previous study, likely related to pancreatitis and tracking inflammatory change.   2.Ancillary findings as described above.           "  Electronically Signed: Sari Christine MD     11/30/2023 11:32 PM EST     Workstation ID: [id number]        Labs:  Lab work independently interpreted by myself shows initial lactic normal.  Ethanol level negative.  1+ ketones in urine and nitrite positive, microscopic urine pending.  Lipase elevated 2100.  CBC shows white count of 14.  Serum creatinine of 1.8 significantly up from previous.     Vitals:  /67   Pulse 104   Temp 97.6 °F (36.4 °C) (Oral)   Resp 18   Ht 182.9 cm (72\")   Wt 61.2 kg (134 lb 14.7 oz)   SpO2 96%   BMI 18.30 kg/m²    Medications given:    Medications   sodium chloride 0.9 % flush 10 mL (has no administration in time range)   sodium chloride 0.9 % bolus 1,000 mL (1,000 mL Intravenous New Bag 12/1/23 0017)   sodium chloride 0.9 % bolus 1,000 mL (1,000 mL Intravenous New Bag 12/1/23 0018)   ondansetron (ZOFRAN) injection 4 mg (4 mg Intravenous Given 11/30/23 2340)   morphine injection 4 mg (4 mg Intravenous Given 11/30/23 2340)   HYDROmorphone (DILAUDID) injection 1 mg (1 mg Intravenous Given 12/1/23 0017)       Procedures:  not indicated  MDM: Patient is a 43-year-old male comes in complaining of abdominal pain. Lab work independently interpreted by myself shows initial lactic normal.  Ethanol level negative.  1+ ketones in urine and nitrite positive, microscopic urine pending.  Lipase elevated 2100.  CBC shows white count of 14.  Serum creatinine of 1.8 significantly up from previous.  Patient given 2 L normal saline bolus in ED.  CT shows findings of pancreatitis and pseudocyst.  Patient denies any alcohol use or history of elevated triglycerides.  I suspect patient's leukocytosis is reactive from vomiting and dry heaving.  Spoke with Dr. Purvis, who excepted patient behalf of hospitalist team for admission to hospital further workup and treatment.  Plan discussed with patient and is agreeable with plan.      Final diagnoses:   Acute pancreatitis, unspecified complication status, " unspecified pancreatitis type   SINGH (acute kidney injury)   Left upper quadrant abdominal pain       ED Disposition  ED Disposition       ED Disposition   Decision to Admit    Condition   --    Comment   Level of Care: Med/Surg [1]   Admitting Physician: STEFANI HERNANDEZ [[id number]]   Attending Physician: STEFANI HERNANDEZ [[id number]]                 No follow-up provider specified.       Medication List      No changes were made to your prescriptions during this visit.            Sarabjit Harris PA  12/01/23 0032      Electronically signed by Sarabjit Harris PA at 12/01/23 0032

## 2023-12-01 NOTE — H&P
[facility] Medicine Services  History & Physical    Patient Name: Dayron Manley  : 1980  MRN: [id number]  Primary Care Physician:  Provider, No Known  Date of admission: 2023  Date and Time of Service: 2023 at 00:51 EST      Subjective      Chief Complaint: Abdominal pain    History of Present Illness: Dayron Manley is a 43 y.o. male with no significant past medical history who presented to [facility] on 2023 with abdominal pain. Per patient, he has had abdominal pain for the past few days.  Abdominal pain is located in the epigastric area, and rated as a 10/10 on a pain scale.  Patient also reported nausea and vomiting.  Endorses decreased appetite.  Of note, patient was recently admitted  and discharged on  after being treated for acute pancreatitis.  Patient stated that that was his first time being admitted for pancreatitis. Pt used to be a heavy drinker a decade ago. He quit drinking excessively about 10 years ago and only drinks rarely on occasions with the last drink being on his birthday, .  Denies any fever or chills, shortness of breath, chest pain, weakness or any recent sick contact.    In the ED, vital signs were stable.  Labs remarkable for creatinine 1.85, lipase 2138, WBC 14.3, urinalysis positive for nitrates, leukocytes and proteins.  Alcohol level was negative.  CT abdomen shows acute pancreatitis with new fluid collection seen within the pancreatic neck and body likely representing pseudocyst.      Review of Systems  As stated above  Personal History     History reviewed. No pertinent past medical history.    History reviewed. No pertinent surgical history.    Family History: family history is not on file. Otherwise pertinent FHx was reviewed and not pertinent to current issue.    Social History:  reports that he has been smoking cigarettes. He has a 7.50 pack-year smoking history. He has never used smokeless tobacco. He  reports that he does not currently use alcohol. He reports that he does not currently use drugs.    Home Medications:  Prior to Admission Medications       Prescriptions Last Dose Informant Patient Reported? Taking?    indomethacin (INDOCIN) 50 MG capsule   No No    Take 1 capsule by mouth 3 (Three) Times a Day As Needed (pain).    ondansetron (ZOFRAN) 4 MG tablet   No No    Take 1 tablet by mouth Every 6 (Six) Hours As Needed for Nausea or Vomiting.    oxyCODONE-acetaminophen (Percocet) 5-325 MG per tablet   No No    Take 1 tablet by mouth Every 6 (Six) Hours As Needed for Moderate Pain.    pantoprazole (PROTONIX) 40 MG EC tablet   No No    Take 1 tablet by mouth Every Morning.    polyethylene glycol (MIRALAX) 17 g packet   No No    Take 17 g by mouth Daily As Needed (Use if senna-docusate is ineffective).    sennosides-docusate (PERICOLACE) 8.6-50 MG per tablet   No No    Take 2 tablets by mouth 2 (Two) Times a Day.              Allergies:  No Known Allergies    Objective      Vitals:   Temp:  [97.6 °F (36.4 °C)] 97.6 °F (36.4 °C)  Heart Rate:  [104] 104  Resp:  [18] 18  BP: (103)/(67) 103/67  Body mass index is 18.3 kg/m².    Physical Exam  General Appearance: AOO x 4, cooperative, no distress, appropriate for age  Head:  Normocephalic, without obvious abnormality  Eyes:  PERRL, EOM's intact, conjunctivae and cornea clear  Nose:  Nares symmetrical, septum midline, mucosa pink  Throat:  Lips, tongue, and mucosa are moist, pink, and intact  Neck:  Supple; symmetrical, trachea midline, no adenopathy  Back:  Symmetrical, ROM normal, no CVA tenderness  Lungs: Respirations unlabored, no audible wheeze  Heart: Regular rate & rhythm, S1 and S2 normal  Abdomen: Tender to palpation, bowel sounds active all four quadrants  Musculoskeletal: Tone and strength strong and symmetrical, all extremities; no joint pain or edema         Skin/Hair/Nails:  Skin warm, dry and intact, no rashes or abnormal dyspigmentation        Diagnostic Data:  Lab Results (last 24 hours)       Procedure Component Value Units Date/Time    Urinalysis, Microscopic Only - Urine, Clean Catch [[id number]]  (Abnormal) Collected: 11/30/23 2309    Specimen: Urine, Clean Catch Updated: 12/01/23 0037     RBC, UA 3-5 /HPF      WBC, UA 3-5 /HPF      Comment: Urine culture not indicated.        Bacteria, UA Trace /HPF      Squamous Epithelial Cells, UA 7-12 /HPF      Hyaline Casts, UA 7-12 /LPF      Mucus, UA Moderate/2+ /HPF      Methodology Manual Light Microscopy    [place] Draw [[id number]] Collected: 11/30/23 2304    Specimen: Blood Updated: 12/01/23 0015    Narrative:      The following orders were created for panel order [place] Draw.  Procedure                               Abnormality         Status                     ---------                               -----------         ------                     Green Top (Gel)[[id number]]                                  Final result               Lavender Top[[id number]]                                     Final result               Gold Top - SST[[id number]]                                   Final result               Light Blue Top[[id number]]                                   Final result                 Please view results for these tests on the individual orders.    Green Top (Gel) [[id number]] Collected: 11/30/23 2304    Specimen: Blood Updated: 12/01/23 0015     Extra Tube Hold for add-ons.     Comment: Auto resulted.       Lavender Top [[id number]] Collected: 11/30/23 2304    Specimen: Blood Updated: 12/01/23 0015     Extra Tube hold for add-on     Comment: Auto resulted       Gold Top - SST [[id number]] Collected: 11/30/23 2304    Specimen: Blood Updated: 12/01/23 0015     Extra Tube Hold for add-ons.     Comment: Auto resulted.       Light Blue Top [[id number]] Collected: 11/30/23 2304    Specimen: Blood Updated: 12/01/23 0015     Extra Tube Hold for add-ons.     Comment: Auto resulted       Ethanol [[id number]]  Collected: 11/30/23 2343    Specimen: Blood Updated: 12/01/23 0004     Ethanol % <0.010 %     Narrative:      Plasma Ethanol Clinical Symptoms:    ETOH (%)               Clinical Symptom  .01-.05              No apparent influence  .03-.12              Euphoria, Diminished judgment and attention   .09-.25              Impaired comprehension, Muscle incoordination  .18-.30              Confusion, Staggered gait, Slurred speech  .25-.40              Markedly decreased response to stimuli, unable to stand or                        walk, vomitting, sleep or stupor  .35-.50              Comatose, Anesthesia, Subnormal body temperature        Urinalysis With Culture If Indicated - Urine, Clean Catch [[id number]]  (Abnormal) Collected: 11/30/23 2309    Specimen: Urine, Clean Catch Updated: 11/30/23 2352     Color, UA Dark Yellow     Appearance, UA Hazy     pH, UA <=5.0     Specific Gravity, UA 1.038     Glucose, UA Negative     Ketones, UA 15 mg/dL (1+)     Bilirubin, UA Moderate (2+)     Comment: Confirmation testing is unavailable.  A serum bilirubin is recommended for further assessment.        Blood, UA Negative     Protein,  mg/dL (2+)     Leuk Esterase, UA Trace     Nitrite, UA Positive     Urobilinogen, UA 1.0 E.U./dL    Narrative:      In absence of clinical symptoms, the presence of pyuria, bacteria, and/or nitrites on the urinalysis result does not correlate with infection.    POC Lactate [[id number]]  (Normal) Collected: 11/30/23 2345    Specimen: Blood Updated: 11/30/23 2347     Lactate 0.7 mmol/L      Comment: Serial Number: [id number]:  [id number]       Lipase [[id number]]  (Abnormal) Collected: 11/30/23 2304    Specimen: Blood Updated: 11/30/23 2341     Lipase 2,138 U/L     Comprehensive Metabolic Panel [[id number]]  (Abnormal) Collected: 11/30/23 2304    Specimen: Blood Updated: 11/30/23 2335     Glucose 134 mg/dL      BUN 24 mg/dL      Creatinine 1.85 mg/dL      Sodium 142 mmol/L      Potassium 4.3  mmol/L      Chloride 101 mmol/L      CO2 27.0 mmol/L      Calcium 10.3 mg/dL      Total Protein 7.9 g/dL      Albumin 4.6 g/dL      ALT (SGPT) 31 U/L      AST (SGOT) 14 U/L      Alkaline Phosphatase 139 U/L      Total Bilirubin 0.8 mg/dL      Globulin 3.3 gm/dL      A/G Ratio 1.4 g/dL      BUN/Creatinine Ratio 13.0     Anion Gap 14.0 mmol/L      eGFR 46.1 mL/min/1.73     Narrative:      GFR Normal >60  Chronic Kidney Disease <60  Kidney Failure <15      CBC & Differential [[id number]]  (Abnormal) Collected: 11/30/23 2304    Specimen: Blood Updated: 11/30/23 2312    Narrative:      The following orders were created for panel order CBC & Differential.  Procedure                               Abnormality         Status                     ---------                               -----------         ------                     CBC Auto Differential[[id number]]        Abnormal            Final result                 Please view results for these tests on the individual orders.    CBC Auto Differential [[id number]]  (Abnormal) Collected: 11/30/23 2304    Specimen: Blood Updated: 11/30/23 2312     WBC 14.30 10*3/mm3      RBC 5.90 10*6/mm3      Hemoglobin 16.6 g/dL      Hematocrit 48.6 %      MCV 82.3 fL      MCH 28.1 pg      MCHC 34.1 g/dL      RDW 12.8 %      RDW-SD 38.9 fl      MPV 7.7 fL      Platelets 609 10*3/mm3      Neutrophil % 84.0 %      Lymphocyte % 9.0 %      Monocyte % 5.8 %      Eosinophil % 0.7 %      Basophil % 0.5 %      Neutrophils, Absolute 12.00 10*3/mm3      Lymphocytes, Absolute 1.30 10*3/mm3      Monocytes, Absolute 0.80 10*3/mm3      Eosinophils, Absolute 0.10 10*3/mm3      Basophils, Absolute 0.10 10*3/mm3      nRBC 0.0 /100 WBC              Imaging Results (Last 24 Hours)       Procedure Component Value Units Date/Time    CT Abdomen Pelvis Without Contrast [[id number]] Collected: 11/30/23 2328     Updated: 11/30/23 2334    Narrative:      CT ABDOMEN PELVIS WO CONTRAST    Date of Exam: 11/30/2023 11:07  PM EST    Indication: Abdominal pain, acute, nonlocalized.    Comparison: 11/19/2023.    Technique: Axial CT images were obtained of the abdomen and pelvis without the administration of contrast. Sagittal and coronal reconstructions were performed.  Automated exposure control and iterative reconstruction methods were used.      Findings:  Lung Bases:     The visualized lung bases and lower mediastinal structures are unremarkable.    Limited evaluation due to lack of intravenous contrast. Liver:  Liver is normal in size and CT density. No focal lesions.    Biliary/Gallbladder:    The gallbladder is normal without evidence of radiopaque stones. The biliary tree is nondilated.    Spleen:  Spleen is normal in size and CT density.    Pancreas:    Stranding is seen surrounding the pancreatic head, neck and body. There is enlargement of these areas present. There is a new fluid collection seen within the pancreatic neck and body junction measuring up to 4.3 x 6.0 cm (series 3 image 45). A smaller   fluid collection is seen inferiorly within the pancreatic neck measuring up to 2.5 x 1.1 cm which may be contiguous. Patchy hypodensity seen within the remaining portions of the pancreatic head and neck. No definite additional collection identified.   Prominent peripancreatic nodes are present. A small to moderate amount of free fluid is present within the pelvis, increased as compared to the previous study. Mild stranding is seen within the mesentery.  Kidneys:    Kidneys are normal in size. There are no stones or hydronephrosis.    Adrenals:    Adrenal glands are unremarkable.    Retroperitoneal/Lymph Nodes/Vasculature:    No retroperitoneal adenopathy is identified.    Gastrointestinal/Mesentery:    The bowel loops are non-dilated without wall thickening or mass. The appendix appears within normal limits. No evidence of obstruction. No free air. No mesenteric fluid collections identified. No significant stool burden  identified.    Bladder:    The bladder is normal.    Genital:     Unremarkable          Bony Structures:     Visualized bony structures are consistent with the patient's age.        Impression:      Impression:  1.Findings consistent with acute pancreatitis with new fluid collections seen within the pancreatic neck and body as described above, likely representing pseudocysts. A small to moderate amount of free fluid is present within the pelvis, increased as   compared to the previous study, likely related to pancreatitis and tracking inflammatory change.  2.Ancillary findings as described above.        Electronically Signed: Sari Christine MD    11/30/2023 11:32 PM EST    Workstation ID: [id number]              Assessment & Plan        This is a 43 y.o. male with PMH of    Active and Resolved Problems  Active Hospital Problems    Diagnosis  POA    **Acute pancreatitis [K85.90]  Yes      Resolved Hospital Problems   No resolved problems to display.     Acute pancreatitis  Lipase level 2138  Alcohol level negative  Make patient n.p.o.  Start LR at 150 cc/h  Will manage pain with Dilaudid IV as needed  Will give Zofran and Compazine as needed  Repeat lipase level in the morning  Repeat CBC    Acute kidney injury  Creatinine 1.85  Pt was given indomethacin at the urgent care- Hold for now.  Continue with IV fluids  Repeat BMP    Possible UTI  Leukocytosis  Leukocytosis might be reactive due to nausea and vomiting  Start Rocephin  Follow-up urine culture    DVT prophylaxis:  Medical DVT prophylaxis orders are present.      CODE STATUS:    Code Status (Patient has no pulse and is not breathing): CPR (Attempt to Resuscitate)  Medical Interventions (Patient has pulse or is breathing): Full Support        Admission Status:  I believe this patient meets inpatient status.      I discussed the patient's findings and my recommendations with patient.      Signature:     This document has been electronically signed by Maile PATEL  MD Yaniv on December 1, 2023 00:51 EST   [facility] Hospitalist Team

## 2023-12-01 NOTE — PROGRESS NOTES
[facility] MEDICINE SERVICE  DAILY PROGRESS NOTE    NAME: Dayron Manley  : 1980  MRN: [id number]      LOS: 0 days     PROVIDER OF SERVICE: Tyrone Dawson MD    Chief Complaint: Acute pancreatitis    Subjective:     Interval History:  History taken from: patient  Patient Complaints: Lower abdominal pain   Patient Denies: Fever/chills or rigors    Review of Systems:   Review of Systems   Constitutional: Negative.    HENT: Negative.     Gastrointestinal:  Positive for abdominal pain. Negative for abdominal distention and blood in stool.   Genitourinary: Negative.    Neurological: Negative.        Objective:     Vital Signs  Temp:  [97.5 °F (36.4 °C)-97.8 °F (36.6 °C)] 97.8 °F (36.6 °C)  Heart Rate:  [] 63  Resp:  [15-18] 15  BP: ()/(61-77) 97/69   Body mass index is 16.71 kg/m².    Physical Exam  Physical Exam  Constitutional:       Appearance: Normal appearance.   HENT:      Head: Normocephalic.      Mouth/Throat:      Mouth: Mucous membranes are moist.   Eyes:      Pupils: Pupils are equal, round, and reactive to light.   Cardiovascular:      Rate and Rhythm: Normal rate and regular rhythm.   Pulmonary:      Effort: Pulmonary effort is normal.      Breath sounds: Normal breath sounds.   Abdominal:      Tenderness: There is abdominal tenderness.      Comments: Epigastric    Musculoskeletal:         General: Normal range of motion.   Skin:     General: Skin is warm.   Neurological:      General: No focal deficit present.      Mental Status: He is alert and oriented to person, place, and time.   Psychiatric:         Mood and Affect: Mood normal.         Scheduled Meds   cefTRIAXone, 1,000 mg, Intravenous, Q24H  enoxaparin, 40 mg, Subcutaneous, Q24H  senna-docusate sodium, 2 tablet, Oral, BID  sodium chloride, 10 mL, Intravenous, Q12H       PRN Meds     senna-docusate sodium **AND** polyethylene glycol **AND** bisacodyl **AND** bisacodyl    HYDROmorphone    ondansetron    oxyCODONE     prochlorperazine    sodium chloride    sodium chloride    sodium chloride   Infusions  lactated ringers, 250 mL/hr, Last Rate: 250 mL/hr (12/01/23 1201)          Diagnostic Data    Results from last 7 days   Lab Units 12/01/23  0403   WBC 10*3/mm3 10.30   HEMOGLOBIN g/dL 13.3   HEMATOCRIT % 39.3   PLATELETS 10*3/mm3 437   GLUCOSE mg/dL 133*   CREATININE mg/dL 1.68*   BUN mg/dL 23*   SODIUM mmol/L 140   POTASSIUM mmol/L 4.4   AST (SGOT) U/L 12   ALT (SGPT) U/L 22   ALK PHOS U/L 104   BILIRUBIN mg/dL 0.5   ANION GAP mmol/L 11.0       CT Abdomen Pelvis Without Contrast    Result Date: 11/30/2023  Impression: 1.Findings consistent with acute pancreatitis with new fluid collections seen within the pancreatic neck and body as described above, likely representing pseudocysts. A small to moderate amount of free fluid is present within the pelvis, increased as compared to the previous study, likely related to pancreatitis and tracking inflammatory change. 2.Ancillary findings as described above. Electronically Signed: Sari Christine MD  11/30/2023 11:32 PM EST  Workstation ID: [id number]       I reviewed the patient's new clinical results.    Assessment/Plan:     Active and Resolved Problems  Acute pancreatitis  Pancreatic pseudocyst from above  Abdominal pain  -Significantly elevated lipase  - CT showing acute pancreatitis with fluid collection within the pancreatic neck likely representing pseudocyst.  Recommend free fluid in the pelvis related to pancreatitis and tracking inflammatory changes.  Bowels not dilated    -Started on clear liquid diet for  -IV fluid hydration with Ringer lactate to 50 cc/h    SINGH from volume depletion  -Continue IV fluid hydration    UTI  -Continue ceftriaxone    DVT prophylaxis:  Medical DVT prophylaxis orders are present.     Code status is   Code Status and Medical Interventions:   Ordered at: 12/01/23 0051     Code Status (Patient has no pulse and is not breathing):    CPR (Attempt to  Resuscitate)     Medical Interventions (Patient has pulse or is breathing):    Full Support       Plan for disposition:home  in 3 days    Time: 30 minutes    Signature: Electronically signed by Tyrone Dawson MD, 12/01/23, 13:08 EST.  [facility] Hospitalist Team

## 2023-12-01 NOTE — CASE MANAGEMENT/SOCIAL WORK
Discharge Planning Assessment   [place]     Patient Name: Dayron Manley  MRN: [id number]  Today's Date: 12/1/2023    Admit Date: 11/30/2023    Plan: Return home alone. Meds to Bed   Discharge Needs Assessment       Row Name 12/01/23 1504       Living Environment    People in Home alone    Current Living Arrangements home;apartment    Potentially Unsafe Housing Conditions none    Primary Care Provided by self    Provides Primary Care For no one    Quality of Family Relationships helpful;involved;supportive    Able to Return to Prior Arrangements yes       Resource/Environmental Concerns    Resource/Environmental Concerns none    Transportation Concerns none       Transition Planning    Patient/Family Anticipates Transition to home    Patient/Family Anticipated Services at Transition none    Transportation Anticipated car, drives self;family or friend will provide       Discharge Needs Assessment    Readmission Within the Last 30 Days no previous admission in last 30 days    Equipment Currently Used at Home none    Concerns to be Addressed no discharge needs identified    Anticipated Changes Related to Illness none    Equipment Needed After Discharge none                   Discharge Plan       Row Name 12/01/23 1504       Plan    Plan Return home alone. Meds to Bed    Patient/Family in Agreement with Plan yes    Plan Comments Barriers: IVFs, IV antibiotics, Clear liquid diet. CM met with Mr. Manley who lives alone, drives, works and is independent.  He does not use any medical equipment.  He wants Meds to Bed. He does not  have a PCP and does not want an appointment made for him but agreed for [facility] phone number placed on his AVS. CM will follow as needed                    Demographic Summary       Row Name 12/01/23 1503       General Information    Admission Type inpatient    Arrived From emergency department    Referral Source admission list    Reason for Consult discharge planning     Preferred Language English       Contact Information    Permission Granted to Share Info With                    Functional Status       Row Name 12/01/23 1503       Functional Status    Usual Activity Tolerance good    Current Activity Tolerance moderate       Functional Status, IADL    Medications independent    Meal Preparation independent    Housekeeping independent    Laundry independent    Shopping independent       Mental Status    General Appearance WDL WDL       Mental Status Summary    Recent Changes in Mental Status/Cognitive Functioning no changes       Employment/    Employment Status employed full-time                    Maintained distance greater than six feet and spent less than 15 minutes in the room.     Lorena DUGAN,RN Case Manager  [facility]  Phone: Desk- [phone] cell- [phone]

## 2023-12-01 NOTE — PLAN OF CARE
Goal Outcome Evaluation:  Plan of Care Reviewed With: patient        Progress: no change   Pt admitted onto unit, pt A&O x4, on room air. Call light within reach.

## 2023-12-02 LAB
ALBUMIN SERPL-MCNC: 3.1 G/DL (ref 3.5–5.2)
ALBUMIN/GLOB SERPL: 1.3 G/DL
ALP SERPL-CCNC: 92 U/L (ref 39–117)
ALT SERPL W P-5'-P-CCNC: 19 U/L (ref 1–41)
ANION GAP SERPL CALCULATED.3IONS-SCNC: 9 MMOL/L (ref 5–15)
AST SERPL-CCNC: 14 U/L (ref 1–40)
BASOPHILS # BLD AUTO: 0 10*3/MM3 (ref 0–0.2)
BASOPHILS NFR BLD AUTO: 0.4 % (ref 0–1.5)
BILIRUB SERPL-MCNC: 0.3 MG/DL (ref 0–1.2)
BUN SERPL-MCNC: 12 MG/DL (ref 6–20)
BUN/CREAT SERPL: 11.3 (ref 7–25)
CALCIUM SPEC-SCNC: 8.6 MG/DL (ref 8.6–10.5)
CHLORIDE SERPL-SCNC: 101 MMOL/L (ref 98–107)
CO2 SERPL-SCNC: 27 MMOL/L (ref 22–29)
CREAT SERPL-MCNC: 1.06 MG/DL (ref 0.76–1.27)
DEPRECATED RDW RBC AUTO: 37.6 FL (ref 37–54)
EGFRCR SERPLBLD CKD-EPI 2021: 89.9 ML/MIN/1.73
EOSINOPHIL # BLD AUTO: 0.1 10*3/MM3 (ref 0–0.4)
EOSINOPHIL NFR BLD AUTO: 1.6 % (ref 0.3–6.2)
ERYTHROCYTE [DISTWIDTH] IN BLOOD BY AUTOMATED COUNT: 12.7 % (ref 12.3–15.4)
GLOBULIN UR ELPH-MCNC: 2.4 GM/DL
GLUCOSE SERPL-MCNC: 99 MG/DL (ref 65–99)
HCT VFR BLD AUTO: 39.7 % (ref 37.5–51)
HGB BLD-MCNC: 13 G/DL (ref 13–17.7)
LYMPHOCYTES # BLD AUTO: 1.1 10*3/MM3 (ref 0.7–3.1)
LYMPHOCYTES NFR BLD AUTO: 11.7 % (ref 19.6–45.3)
MAGNESIUM SERPL-MCNC: 1.9 MG/DL (ref 1.6–2.6)
MCH RBC QN AUTO: 27.6 PG (ref 26.6–33)
MCHC RBC AUTO-ENTMCNC: 32.7 G/DL (ref 31.5–35.7)
MCV RBC AUTO: 84.5 FL (ref 79–97)
MONOCYTES # BLD AUTO: 0.6 10*3/MM3 (ref 0.1–0.9)
MONOCYTES NFR BLD AUTO: 6.3 % (ref 5–12)
NEUTROPHILS NFR BLD AUTO: 7.4 10*3/MM3 (ref 1.7–7)
NEUTROPHILS NFR BLD AUTO: 80 % (ref 42.7–76)
NRBC BLD AUTO-RTO: 0 /100 WBC (ref 0–0.2)
PLATELET # BLD AUTO: 397 10*3/MM3 (ref 140–450)
PMV BLD AUTO: 8.3 FL (ref 6–12)
POTASSIUM SERPL-SCNC: 4.2 MMOL/L (ref 3.5–5.2)
PROT SERPL-MCNC: 5.5 G/DL (ref 6–8.5)
RBC # BLD AUTO: 4.7 10*6/MM3 (ref 4.14–5.8)
SODIUM SERPL-SCNC: 137 MMOL/L (ref 136–145)
WBC NRBC COR # BLD AUTO: 9.2 10*3/MM3 (ref 3.4–10.8)

## 2023-12-02 PROCEDURE — 25010000002 HYDROMORPHONE 1 MG/ML SOLUTION: Performed by: STUDENT IN AN ORGANIZED HEALTH CARE EDUCATION/TRAINING PROGRAM

## 2023-12-02 PROCEDURE — 83735 ASSAY OF MAGNESIUM: CPT | Performed by: STUDENT IN AN ORGANIZED HEALTH CARE EDUCATION/TRAINING PROGRAM

## 2023-12-02 PROCEDURE — 25810000003 LACTATED RINGERS PER 1000 ML: Performed by: STUDENT IN AN ORGANIZED HEALTH CARE EDUCATION/TRAINING PROGRAM

## 2023-12-02 PROCEDURE — 85025 COMPLETE CBC W/AUTO DIFF WBC: CPT | Performed by: STUDENT IN AN ORGANIZED HEALTH CARE EDUCATION/TRAINING PROGRAM

## 2023-12-02 PROCEDURE — 80053 COMPREHEN METABOLIC PANEL: CPT | Performed by: STUDENT IN AN ORGANIZED HEALTH CARE EDUCATION/TRAINING PROGRAM

## 2023-12-02 PROCEDURE — 25010000002 ENOXAPARIN PER 10 MG: Performed by: HOSPITALIST

## 2023-12-02 PROCEDURE — 25010000002 CEFTRIAXONE PER 250 MG: Performed by: HOSPITALIST

## 2023-12-02 RX ADMIN — OXYCODONE 5 MG: 5 TABLET ORAL at 00:34

## 2023-12-02 RX ADMIN — SODIUM CHLORIDE, POTASSIUM CHLORIDE, SODIUM LACTATE AND CALCIUM CHLORIDE 250 ML/HR: 600; 310; 30; 20 INJECTION, SOLUTION INTRAVENOUS at 08:18

## 2023-12-02 RX ADMIN — PANTOPRAZOLE SODIUM 40 MG: 40 INJECTION, POWDER, FOR SOLUTION INTRAVENOUS at 05:46

## 2023-12-02 RX ADMIN — HYDROMORPHONE HYDROCHLORIDE 1 MG: 1 INJECTION, SOLUTION INTRAMUSCULAR; INTRAVENOUS; SUBCUTANEOUS at 13:09

## 2023-12-02 RX ADMIN — SODIUM CHLORIDE, POTASSIUM CHLORIDE, SODIUM LACTATE AND CALCIUM CHLORIDE 250 ML/HR: 600; 310; 30; 20 INJECTION, SOLUTION INTRAVENOUS at 00:34

## 2023-12-02 RX ADMIN — SODIUM CHLORIDE, POTASSIUM CHLORIDE, SODIUM LACTATE AND CALCIUM CHLORIDE 250 ML/HR: 600; 310; 30; 20 INJECTION, SOLUTION INTRAVENOUS at 21:47

## 2023-12-02 RX ADMIN — OXYCODONE 5 MG: 5 TABLET ORAL at 15:00

## 2023-12-02 RX ADMIN — OXYCODONE 5 MG: 5 TABLET ORAL at 20:37

## 2023-12-02 RX ADMIN — Medication 10 ML: at 20:38

## 2023-12-02 RX ADMIN — HYDROMORPHONE HYDROCHLORIDE 1 MG: 1 INJECTION, SOLUTION INTRAMUSCULAR; INTRAVENOUS; SUBCUTANEOUS at 04:06

## 2023-12-02 RX ADMIN — HYDROMORPHONE HYDROCHLORIDE 1 MG: 1 INJECTION, SOLUTION INTRAMUSCULAR; INTRAVENOUS; SUBCUTANEOUS at 21:47

## 2023-12-02 RX ADMIN — CEFTRIAXONE 1000 MG: 1 INJECTION, POWDER, FOR SOLUTION INTRAMUSCULAR; INTRAVENOUS at 02:44

## 2023-12-02 RX ADMIN — HYDROMORPHONE HYDROCHLORIDE 1 MG: 1 INJECTION, SOLUTION INTRAMUSCULAR; INTRAVENOUS; SUBCUTANEOUS at 17:14

## 2023-12-02 RX ADMIN — Medication 10 ML: at 08:13

## 2023-12-02 RX ADMIN — ENOXAPARIN SODIUM 40 MG: 100 INJECTION SUBCUTANEOUS at 15:00

## 2023-12-02 RX ADMIN — OXYCODONE 5 MG: 5 TABLET ORAL at 11:03

## 2023-12-02 RX ADMIN — HYDROMORPHONE HYDROCHLORIDE 1 MG: 1 INJECTION, SOLUTION INTRAMUSCULAR; INTRAVENOUS; SUBCUTANEOUS at 08:10

## 2023-12-02 NOTE — PLAN OF CARE
Goal Outcome Evaluation:  Plan of Care Reviewed With: patient        Progress: no change  Outcome Evaluation: Pt cont to complaints of pain, pt states he has minimal relief of pain with alternate dilaudid and oxy. pt is getting iv fluid and abx. will cont to monitor.

## 2023-12-02 NOTE — CONSULTS
Nutrition Services    Patient Name: Dayron Manley  YOB: 1980  MRN: [id number]  Admission date: 11/30/2023    Advance diet as tolerated/as clinically indicated. Encourage good PO intake.    Addition of Boost Breeze TID (Provides 750 kcals, 42 g protein if consumed).    PPE Documentation        PPE Worn By Provider gloves   PPE Worn By Patient  N/A     NUTRITION SCREENING      Trending Narrative: 12/2: Pt being assessed for BMI < 19. Pt admitted to [facility] with acute pancreatitis. Pt reported he quit excessively drinking about 10 years ago and only drinks on rare occasions at this time. CT AP showed acute pancreatitis with new fluid collection seen within the pancreatic neck and body likely representing pseudocyst. RD visited pt at bedside. Pt reported he generally eats whatever he wants and a good quantity of food,. When he starts to have abdominal pain suggestive of an acute pancreatitis flare up he will fast for a couple of days which causes symptoms to subside. Once symptoms have resolved he returns to eating whatever he wants. Pt does drink an ONS at home although he is unsure what the brand is. Pt also unsure of calorie/protein content. Pt agreeable to Boost Breeze while on clear liquid diet. RD provided pt with Boost Breeze [place] at time of visit. Pt reports UBW is 170#. RD noted that bed scale wt at time of visit was 141# but currently recorded wt is 123#. Pt reported he had just gotten back to working out 7 days a week prior to pancreatitis flare up. NFPE completed and not consistent with nutrition diagnosis of malnutrition at this time using AND/ASPEN criteria          PO Diet: Diet: Liquid Diets; Clear Liquid; Fluid Consistency: Thin (IDDSI 0)   PO Supplements: -   Trending PO Intake:  12/2: minimal nutrition with nature of clear liquid diet and/or NPO status since admission       Nutritionally-Pertinent Medications RDN Reviewed, C/W clinical course         Labs (reviewed below): Reviewed,  "management per attending      Results from last 7 days   Lab Units 12/02/23  0436 12/01/23  0403 11/30/23  2304   SODIUM mmol/L 137 140 142   POTASSIUM mmol/L 4.2 4.4 4.3   CHLORIDE mmol/L 101 104 101   CO2 mmol/L 27.0 25.0 27.0   BUN mg/dL 12 23* 24*   CREATININE mg/dL 1.06 1.68* 1.85*   CALCIUM mg/dL 8.6 8.9 10.3   BILIRUBIN mg/dL 0.3 0.5 0.8   ALK PHOS U/L 92 104 139*   ALT (SGPT) U/L 19 22 31   AST (SGOT) U/L 14 12 14   GLUCOSE mg/dL 99 133* 134*     Results from last 7 days   Lab Units 12/02/23 0436 12/01/23  0403   MAGNESIUM mg/dL 1.9  --    HEMOGLOBIN g/dL 13.0 13.3   HEMATOCRIT % 39.7 39.3   TRIGLYCERIDES mg/dL  --  124     Lab Results   Component Value Date    HGBA1C 5.10 11/30/2023          GI Function:  No documented BM this admission        Skin: Intact        Weight Review: Estimated body mass index is 16.71 kg/m² as calculated from the following:    Height as of this encounter: 182.9 cm (72.01\").    Weight as of this encounter: 55.9 kg (123 lb 3.8 oz).    Comment:   16% wt loss x 1 month, question accuracy. Net fluid gain of >3L since admission per I/O documentation. Pt reports UBW of 170#.    Wt Readings from Last 30 Encounters:   12/01/23 0134 55.9 kg (123 lb 3.8 oz)   11/30/23 2246 61.2 kg (134 lb 14.7 oz)   11/29/23 1148 68 kg (150 lb)   11/23/23 0543 68.9 kg (151 lb 14.4 oz)   11/22/23 0500 68.5 kg (151 lb 0.2 oz)   11/21/23 2311 68.5 kg (151 lb 0.2 oz)   11/21/23 0542 66.6 kg (146 lb 13.2 oz)   11/20/23 0500 64.5 kg (142 lb 3.2 oz)   11/19/23 0822 68 kg (150 lb)          --       Nutrition Problem Statement: Inadequate energy intake related to clinical course as evidenced by clear liquid diet or NPO since admission.         Nutrition Intervention: Advance diet as tolerated/as clinically indicated. Encourage good PO intake.    Addition of Boost Breeze TID (Provides 750 kcals, 42 g protein if consumed).          Monitoring/Evaluation Per protocol, PO intake, Supplement intake, Pertinent labs, " Weight, GI status          RD to follow up per protocol.    Electronically signed by:  Asya Zavala RD  12/02/23 08:22 EST

## 2023-12-02 NOTE — PROGRESS NOTES
[facility] MEDICINE SERVICE  DAILY PROGRESS NOTE    NAME: Dayron Manley  : 1980  MRN: [id number]      LOS: 1 day     PROVIDER OF SERVICE: Tyrone Dawson MD    Chief Complaint: Acute pancreatitis    Subjective:     Interval History:  History taken from: patient  Patient Complaints: Lower abdominal pain , better than previous day, tolerated CLD will advance to full liquid   Patient Denies: Fever/chills or rigors    Review of Systems:   Review of Systems   Constitutional: Negative.    HENT: Negative.     Gastrointestinal:  Positive for abdominal pain. Negative for abdominal distention and blood in stool.   Genitourinary: Negative.    Neurological: Negative.        Objective:     Vital Signs  Temp:  [97.3 °F (36.3 °C)-98.2 °F (36.8 °C)] 98.2 °F (36.8 °C)  Heart Rate:  [73-91] 91  Resp:  [14-17] 14  BP: ()/(66-71) 107/66   Body mass index is 16.71 kg/m².    Physical Exam  Physical Exam  Constitutional:       Appearance: Normal appearance.   HENT:      Head: Normocephalic.      Mouth/Throat:      Mouth: Mucous membranes are moist.   Eyes:      Pupils: Pupils are equal, round, and reactive to light.   Cardiovascular:      Rate and Rhythm: Normal rate and regular rhythm.   Pulmonary:      Effort: Pulmonary effort is normal.      Breath sounds: Normal breath sounds.   Abdominal:      Tenderness: There is abdominal tenderness.      Comments: Epigastric    Musculoskeletal:         General: Normal range of motion.   Skin:     General: Skin is warm.   Neurological:      General: No focal deficit present.      Mental Status: He is alert and oriented to person, place, and time.   Psychiatric:         Mood and Affect: Mood normal.         Scheduled Meds   cefTRIAXone, 1,000 mg, Intravenous, Q24H  enoxaparin, 40 mg, Subcutaneous, Q24H  pantoprazole, 40 mg, Intravenous, Q AM  senna-docusate sodium, 2 tablet, Oral, BID  sodium chloride, 10 mL, Intravenous, Q12H       PRN Meds     senna-docusate sodium **AND**  polyethylene glycol **AND** bisacodyl **AND** bisacodyl    HYDROmorphone    ondansetron    oxyCODONE    prochlorperazine    sodium chloride    sodium chloride    sodium chloride   Infusions  lactated ringers, 250 mL/hr, Last Rate: 250 mL/hr (12/02/23 0818)          Diagnostic Data    Results from last 7 days   Lab Units 12/02/23  0436   WBC 10*3/mm3 9.20   HEMOGLOBIN g/dL 13.0   HEMATOCRIT % 39.7   PLATELETS 10*3/mm3 397   GLUCOSE mg/dL 99   CREATININE mg/dL 1.06   BUN mg/dL 12   SODIUM mmol/L 137   POTASSIUM mmol/L 4.2   AST (SGOT) U/L 14   ALT (SGPT) U/L 19   ALK PHOS U/L 92   BILIRUBIN mg/dL 0.3   ANION GAP mmol/L 9.0       CT Abdomen Pelvis Without Contrast    Result Date: 11/30/2023  Impression: 1.Findings consistent with acute pancreatitis with new fluid collections seen within the pancreatic neck and body as described above, likely representing pseudocysts. A small to moderate amount of free fluid is present within the pelvis, increased as compared to the previous study, likely related to pancreatitis and tracking inflammatory change. 2.Ancillary findings as described above. Electronically Signed: Sari Christine MD  11/30/2023 11:32 PM EST  Workstation ID: [id number]       I reviewed the patient's new clinical results.    Assessment/Plan:     Active and Resolved Problems  Acute pancreatitis  Pancreatic pseudocyst from above  Abdominal pain  -Significantly elevated lipase  - CT showing acute pancreatitis with fluid collection within the pancreatic neck likely representing pseudocyst.  Recommend free fluid in the pelvis related to pancreatitis and tracking inflammatory changes.  Bowels not dilated    -On clear liquid diet advanced full liquid  -IV fluid hydration with Ringer lactate to 250 cc/h    SINGH from volume depletion  -Continue IV fluid hydration    UTI  -Continue ceftriaxone    DVT prophylaxis:  Medical DVT prophylaxis orders are present.     Code status is   Code Status and Medical Interventions:   Ordered  at: 12/01/23 0051     Code Status (Patient has no pulse and is not breathing):    CPR (Attempt to Resuscitate)     Medical Interventions (Patient has pulse or is breathing):    Full Support       Plan for disposition:home  in 3 days    Time: 30 minutes    Signature: Electronically signed by Tyrone Dawson MD, 12/02/23, 15:42 EST.  [facility] Hospitalist Team

## 2023-12-03 ENCOUNTER — INPATIENT HOSPITAL (OUTPATIENT)
Dept: URBAN - METROPOLITAN AREA HOSPITAL 84 | Facility: HOSPITAL | Age: 43
End: 2023-12-03

## 2023-12-03 DIAGNOSIS — R10.10 UPPER ABDOMINAL PAIN, UNSPECIFIED: ICD-10-CM

## 2023-12-03 DIAGNOSIS — F10.10 ALCOHOL ABUSE, UNCOMPLICATED: ICD-10-CM

## 2023-12-03 DIAGNOSIS — R82.5 ELEVATED URINE LEVELS OF DRUGS, MEDICAMENTS AND BIOLOGICAL S: ICD-10-CM

## 2023-12-03 DIAGNOSIS — K86.3 PSEUDOCYST OF PANCREAS: ICD-10-CM

## 2023-12-03 DIAGNOSIS — K85.20 ALCOHOL INDUCED ACUTE PANCREATITIS WITHOUT NECROSIS OR INFEC: ICD-10-CM

## 2023-12-03 DIAGNOSIS — D64.9 ANEMIA, UNSPECIFIED: ICD-10-CM

## 2023-12-03 LAB
ALBUMIN SERPL-MCNC: 3 G/DL (ref 3.5–5.2)
ALBUMIN/GLOB SERPL: 1.2 G/DL
ALP SERPL-CCNC: 88 U/L (ref 39–117)
ALT SERPL W P-5'-P-CCNC: 10 U/L (ref 1–41)
ANION GAP SERPL CALCULATED.3IONS-SCNC: 11 MMOL/L (ref 5–15)
AST SERPL-CCNC: 7 U/L (ref 1–40)
BASOPHILS # BLD AUTO: 0 10*3/MM3 (ref 0–0.2)
BASOPHILS NFR BLD AUTO: 0.4 % (ref 0–1.5)
BILIRUB SERPL-MCNC: 0.5 MG/DL (ref 0–1.2)
BUN SERPL-MCNC: 5 MG/DL (ref 6–20)
BUN/CREAT SERPL: 6.7 (ref 7–25)
CALCIUM SPEC-SCNC: 8.6 MG/DL (ref 8.6–10.5)
CHLORIDE SERPL-SCNC: 100 MMOL/L (ref 98–107)
CO2 SERPL-SCNC: 27 MMOL/L (ref 22–29)
CREAT SERPL-MCNC: 0.75 MG/DL (ref 0.76–1.27)
CRP SERPL-MCNC: 18.84 MG/DL (ref 0–0.5)
DEPRECATED RDW RBC AUTO: 38.9 FL (ref 37–54)
EGFRCR SERPLBLD CKD-EPI 2021: 115.5 ML/MIN/1.73
EOSINOPHIL # BLD AUTO: 0.1 10*3/MM3 (ref 0–0.4)
EOSINOPHIL NFR BLD AUTO: 1.6 % (ref 0.3–6.2)
ERYTHROCYTE [DISTWIDTH] IN BLOOD BY AUTOMATED COUNT: 12.7 % (ref 12.3–15.4)
GLOBULIN UR ELPH-MCNC: 2.6 GM/DL
GLUCOSE SERPL-MCNC: 91 MG/DL (ref 65–99)
HCT VFR BLD AUTO: 37.7 % (ref 37.5–51)
HGB BLD-MCNC: 12.5 G/DL (ref 13–17.7)
LYMPHOCYTES # BLD AUTO: 0.8 10*3/MM3 (ref 0.7–3.1)
LYMPHOCYTES NFR BLD AUTO: 13.8 % (ref 19.6–45.3)
MCH RBC QN AUTO: 27.5 PG (ref 26.6–33)
MCHC RBC AUTO-ENTMCNC: 33.1 G/DL (ref 31.5–35.7)
MCV RBC AUTO: 83.2 FL (ref 79–97)
MONOCYTES # BLD AUTO: 0.5 10*3/MM3 (ref 0.1–0.9)
MONOCYTES NFR BLD AUTO: 9 % (ref 5–12)
NEUTROPHILS NFR BLD AUTO: 4.6 10*3/MM3 (ref 1.7–7)
NEUTROPHILS NFR BLD AUTO: 75.2 % (ref 42.7–76)
NRBC BLD AUTO-RTO: 0 /100 WBC (ref 0–0.2)
PLATELET # BLD AUTO: 322 10*3/MM3 (ref 140–450)
PMV BLD AUTO: 8.2 FL (ref 6–12)
POTASSIUM SERPL-SCNC: 3.9 MMOL/L (ref 3.5–5.2)
PROT SERPL-MCNC: 5.6 G/DL (ref 6–8.5)
RBC # BLD AUTO: 4.53 10*6/MM3 (ref 4.14–5.8)
SODIUM SERPL-SCNC: 138 MMOL/L (ref 136–145)
WBC NRBC COR # BLD AUTO: 6.1 10*3/MM3 (ref 3.4–10.8)

## 2023-12-03 PROCEDURE — 25010000002 HYDROMORPHONE 1 MG/ML SOLUTION: Performed by: STUDENT IN AN ORGANIZED HEALTH CARE EDUCATION/TRAINING PROGRAM

## 2023-12-03 PROCEDURE — 25010000002 CEFTRIAXONE PER 250 MG: Performed by: HOSPITALIST

## 2023-12-03 PROCEDURE — 86140 C-REACTIVE PROTEIN: CPT | Performed by: NURSE PRACTITIONER

## 2023-12-03 PROCEDURE — 99222 1ST HOSP IP/OBS MODERATE 55: CPT | Performed by: NURSE PRACTITIONER

## 2023-12-03 PROCEDURE — 25810000003 LACTATED RINGERS PER 1000 ML: Performed by: STUDENT IN AN ORGANIZED HEALTH CARE EDUCATION/TRAINING PROGRAM

## 2023-12-03 PROCEDURE — 85025 COMPLETE CBC W/AUTO DIFF WBC: CPT | Performed by: STUDENT IN AN ORGANIZED HEALTH CARE EDUCATION/TRAINING PROGRAM

## 2023-12-03 PROCEDURE — 25010000002 ENOXAPARIN PER 10 MG: Performed by: HOSPITALIST

## 2023-12-03 PROCEDURE — 80053 COMPREHEN METABOLIC PANEL: CPT | Performed by: STUDENT IN AN ORGANIZED HEALTH CARE EDUCATION/TRAINING PROGRAM

## 2023-12-03 RX ADMIN — HYDROMORPHONE HYDROCHLORIDE 1 MG: 1 INJECTION, SOLUTION INTRAMUSCULAR; INTRAVENOUS; SUBCUTANEOUS at 15:05

## 2023-12-03 RX ADMIN — HYDROMORPHONE HYDROCHLORIDE 1 MG: 1 INJECTION, SOLUTION INTRAMUSCULAR; INTRAVENOUS; SUBCUTANEOUS at 06:34

## 2023-12-03 RX ADMIN — Medication 10 ML: at 20:00

## 2023-12-03 RX ADMIN — HYDROMORPHONE HYDROCHLORIDE 1 MG: 1 INJECTION, SOLUTION INTRAMUSCULAR; INTRAVENOUS; SUBCUTANEOUS at 23:50

## 2023-12-03 RX ADMIN — SODIUM CHLORIDE, POTASSIUM CHLORIDE, SODIUM LACTATE AND CALCIUM CHLORIDE 250 ML/HR: 600; 310; 30; 20 INJECTION, SOLUTION INTRAVENOUS at 09:31

## 2023-12-03 RX ADMIN — SODIUM CHLORIDE, POTASSIUM CHLORIDE, SODIUM LACTATE AND CALCIUM CHLORIDE 125 ML/HR: 600; 310; 30; 20 INJECTION, SOLUTION INTRAVENOUS at 20:00

## 2023-12-03 RX ADMIN — ENOXAPARIN SODIUM 40 MG: 100 INJECTION SUBCUTANEOUS at 15:05

## 2023-12-03 RX ADMIN — Medication 10 ML: at 09:34

## 2023-12-03 RX ADMIN — OXYCODONE 5 MG: 5 TABLET ORAL at 04:24

## 2023-12-03 RX ADMIN — OXYCODONE 5 MG: 5 TABLET ORAL at 09:05

## 2023-12-03 RX ADMIN — HYDROMORPHONE HYDROCHLORIDE 1 MG: 1 INJECTION, SOLUTION INTRAMUSCULAR; INTRAVENOUS; SUBCUTANEOUS at 10:43

## 2023-12-03 RX ADMIN — SODIUM CHLORIDE, POTASSIUM CHLORIDE, SODIUM LACTATE AND CALCIUM CHLORIDE 250 ML/HR: 600; 310; 30; 20 INJECTION, SOLUTION INTRAVENOUS at 04:24

## 2023-12-03 RX ADMIN — OXYCODONE 5 MG: 5 TABLET ORAL at 17:29

## 2023-12-03 RX ADMIN — CEFTRIAXONE 1000 MG: 1 INJECTION, POWDER, FOR SOLUTION INTRAMUSCULAR; INTRAVENOUS at 01:53

## 2023-12-03 RX ADMIN — DOCUSATE SODIUM 50 MG AND SENNOSIDES 8.6 MG 2 TABLET: 8.6; 5 TABLET, FILM COATED ORAL at 09:05

## 2023-12-03 RX ADMIN — OXYCODONE 5 MG: 5 TABLET ORAL at 13:31

## 2023-12-03 RX ADMIN — HYDROMORPHONE HYDROCHLORIDE 1 MG: 1 INJECTION, SOLUTION INTRAMUSCULAR; INTRAVENOUS; SUBCUTANEOUS at 19:23

## 2023-12-03 RX ADMIN — SODIUM CHLORIDE, POTASSIUM CHLORIDE, SODIUM LACTATE AND CALCIUM CHLORIDE 250 ML/HR: 600; 310; 30; 20 INJECTION, SOLUTION INTRAVENOUS at 13:41

## 2023-12-03 RX ADMIN — HYDROMORPHONE HYDROCHLORIDE 1 MG: 1 INJECTION, SOLUTION INTRAMUSCULAR; INTRAVENOUS; SUBCUTANEOUS at 01:53

## 2023-12-03 RX ADMIN — PANTOPRAZOLE SODIUM 40 MG: 40 INJECTION, POWDER, FOR SOLUTION INTRAVENOUS at 09:05

## 2023-12-03 NOTE — PLAN OF CARE
Problem: Adult Inpatient Plan of Care  Goal: Plan of Care Review  Outcome: Ongoing, Not Progressing  Flowsheets (Taken 12/3/2023 0000)  Progress: no change  Plan of Care Reviewed With: patient  Goal: Patient-Specific Goal (Individualized)  Outcome: Ongoing, Not Progressing  Goal: Absence of Hospital-Acquired Illness or Injury  Outcome: Ongoing, Not Progressing  Intervention: Identify and Manage Fall Risk  Recent Flowsheet Documentation  Taken 12/3/2023 0000 by Shannan Shaikh RN  Safety Promotion/Fall Prevention: safety round/check completed  Taken 12/2/2023 2200 by Shannan Shaikh RN  Safety Promotion/Fall Prevention: safety round/check completed  Taken 12/2/2023 2037 by Shannan Shaikh RN  Safety Promotion/Fall Prevention:   safety round/check completed   assistive device/personal items within reach   clutter free environment maintained   elopement precautions   lighting adjusted   nonskid shoes/slippers when out of bed   room organization consistent  Intervention: Prevent Skin Injury  Recent Flowsheet Documentation  Taken 12/2/2023 2037 by Shannan Shaikh RN  Body Position:   supine   sitting up in bed   position changed independently  Intervention: Prevent and Manage VTE (Venous Thromboembolism) Risk  Recent Flowsheet Documentation  Taken 12/2/2023 2037 by Shannan Shaikh RN  Activity Management: up ad sina  Goal: Optimal Comfort and Wellbeing  Outcome: Ongoing, Not Progressing  Intervention: Monitor Pain and Promote Comfort  Recent Flowsheet Documentation  Taken 12/2/2023 2037 by Shannan Shaikh RN  Pain Management Interventions: see MAR  Intervention: Provide Person-Centered Care  Recent Flowsheet Documentation  Taken 12/2/2023 2037 by Shannan Shaikh RN  Trust Relationship/Rapport:   care explained   choices provided   emotional support provided   empathic listening provided   questions answered   questions encouraged   reassurance provided   thoughts/feelings acknowledged  Goal: Readiness for  Transition of Care  Outcome: Ongoing, Not Progressing     Problem: Pain Acute  Goal: Acceptable Pain Control and Functional Ability  Outcome: Ongoing, Not Progressing  Intervention: Prevent or Manage Pain  Recent Flowsheet Documentation  Taken 12/2/2023 2037 by Shannan Shaikh RN  Sensory Stimulation Regulation: care clustered  Sleep/Rest Enhancement: awakenings minimized  Medication Review/Management: medications reviewed  Intervention: Develop Pain Management Plan  Recent Flowsheet Documentation  Taken 12/2/2023 2037 by Shannan Shaikh RN  Pain Management Interventions: see MAR  Intervention: Optimize Psychosocial Wellbeing  Recent Flowsheet Documentation  Taken 12/2/2023 2037 by Shannan Shaikh, RN  Supportive Measures: active listening utilized  Diversional Activities:   smartphone   television     Problem: Fall Injury Risk  Goal: Absence of Fall and Fall-Related Injury  Outcome: Ongoing, Not Progressing  Intervention: Identify and Manage Contributors  Recent Flowsheet Documentation  Taken 12/2/2023 2037 by Shannan Shaikh RN  Medication Review/Management: medications reviewed  Intervention: Promote Injury-Free Environment  Recent Flowsheet Documentation  Taken 12/3/2023 0000 by Shannan Shaikh RN  Safety Promotion/Fall Prevention: safety round/check completed  Taken 12/2/2023 2200 by Shannan Shaikh RN  Safety Promotion/Fall Prevention: safety round/check completed  Taken 12/2/2023 2037 by Shannan Shaikh RN  Safety Promotion/Fall Prevention:   safety round/check completed   assistive device/personal items within reach   clutter free environment maintained   elopement precautions   lighting adjusted   nonskid shoes/slippers when out of bed   room organization consistent     Problem: Nausea and Vomiting  Goal: Fluid and Electrolyte Balance  Outcome: Ongoing, Not Progressing  Intervention: Prevent and Manage Nausea and Vomiting  Recent Flowsheet Documentation  Taken 12/2/2023 2037 by Shannan Shaikh  RN  Environmental Support: calm environment promoted   Goal Outcome Evaluation:  Plan of Care Reviewed With: patient        Progress: no change

## 2023-12-03 NOTE — CONSULTS
GI CONSULT  NOTE:    Referring Provider:  Dr. Dawson    Chief complaint: Acute pancreatitis with pseudocyst    Subjective left upper quadrant abdominal pain      History of present illness: Dayron Manley is a 43 y.o. male who has a history of alcohol abuse who presented to the emergency room on 11/30/2023 after being discharged from the hospital on 11/24/2023 for pancreatitis.   Patient was seen by our practice on 12/20/2023 and diagnosed with acute pancreatitis likely secondary to alcohol.  Patient states this is the first time he is ever heard of pancreatitis upon seeing him today.  Patient states he has been having symptoms monthly for about the previous 6 months.  Patient states on 11/19/2023 he had fever, nausea, vomiting, diaphoresis he came into the ER to get IV fluids.  Upon reviewing his chart he actually was admitted into the hospital and again not discharged until 12/24/2023.  Patient returned to urgent care center on 11/29/2023 for left upper quadrant abdominal pain stating that he was discharged on oxycodone and it was not helping much.  Patient states he has a history of heavy alcohol abuse he told us today 10 years ago but last admission it was 5 years ago.  Patient reports drinking several drinks on his birthday 11/3/2022.  Patient states his pain is mainly in his left upper quadrant is pressure in radiates around to his back.  Patient states the pain is worse with movement.  He denies any new medications or antibiotics.  Denies any NSAID usage.  Patient continues to smoke half pack of cigarettes a day.    CT this admission shows pancreatitis with pseudocyst formation     Endo History:  No previous EGD or colonoscopy     Past Medical History:  Alcohol abuse    Past Surgical History:  History reviewed. No pertinent surgical history.    Social History:  Social History     Tobacco Use    Smoking status: Every Day     Packs/day: 0.50     Years: 15.00     Additional pack years: 0.00     Total pack  years: 7.50     Types: Cigarettes    Smokeless tobacco: Never   Vaping Use    Vaping Use: Never used   Substance Use Topics    Alcohol use: Not Currently    Drug use: Not Currently   Alcohol abuse    Family History:  Uncles with alcohol abuse.  No family history of pancreatic disease.  Family history of gallbladder disease    Medications:  Medications Prior to Admission   Medication Sig Dispense Refill Last Dose    indomethacin (INDOCIN) 50 MG capsule Take 1 capsule by mouth 3 (Three) Times a Day As Needed (pain). 30 capsule 0     ondansetron (ZOFRAN) 4 MG tablet Take 1 tablet by mouth Every 6 (Six) Hours As Needed for Nausea or Vomiting. 60 tablet 0     oxyCODONE-acetaminophen (Percocet) 5-325 MG per tablet Take 1 tablet by mouth Every 6 (Six) Hours As Needed for Moderate Pain. 14 tablet 0     pantoprazole (PROTONIX) 40 MG EC tablet Take 1 tablet by mouth Every Morning. 30 tablet 0     polyethylene glycol (MIRALAX) 17 g packet Take 17 g by mouth Daily As Needed (Use if senna-docusate is ineffective). 30 each 0     sennosides-docusate (PERICOLACE) 8.6-50 MG per tablet Take 2 tablets by mouth 2 (Two) Times a Day. 30 tablet 0        Scheduled Meds:enoxaparin, 40 mg, Subcutaneous, Q24H  pantoprazole, 40 mg, Intravenous, Q AM  senna-docusate sodium, 2 tablet, Oral, BID  sodium chloride, 10 mL, Intravenous, Q12H      Continuous Infusions:lactated ringers, 125 mL/hr, Last Rate: 125 mL/hr (12/03/23 1456)      PRN Meds:.  senna-docusate sodium **AND** polyethylene glycol **AND** bisacodyl **AND** bisacodyl    HYDROmorphone    ondansetron    oxyCODONE    prochlorperazine    sodium chloride    sodium chloride    sodium chloride    ALLERGIES:  Patient has no known allergies.    ROS: Abdominal pain, nausea, vomiting  The following systems were reviewed   Constitution:  No fevers, chills, no unintentional weight loss  Skin: no rash, no jaundice  Eyes:  No blurry vision, no eye pain  HENT:  No change in hearing or smell  Resp:   No dyspnea or cough  CV:  No chest pain or palpitations  :  No dysuria, hematuria  Musculoskeletal:  No leg cramps or arthralgias  Neuro:  No tremor, no numbness  Psych:  No depression or confusion    Objective resting comfortably in hospital bed.  NAD.  Room 229.    Vital Signs:   Vitals:    12/03/23 0254 12/03/23 0536 12/03/23 1053 12/03/23 1510   BP: 102/68 110/71 115/75 125/81   BP Location: Left arm Left arm Left arm Left arm   Patient Position: Lying Lying Lying Lying   Pulse: 75 73 73 79   Resp: 16 16 18 18   Temp: 98.3 °F (36.8 °C) 98.1 °F (36.7 °C) 97.9 °F (36.6 °C) 98 °F (36.7 °C)   TempSrc: Oral Oral Oral Oral   SpO2: 97% 98% 98% 96%   Weight:       Height:           Physical Exam:   General Appearance:    Awake and alert, in no acute distress   Head:    Normocephalic, without obvious abnormality, atraumatic   Throat:   No oral lesions, no thrush, oral mucosa moist   Lungs:     Respirations regular, even and unlabored   Chest Wall:  Pectus excavatum   Abdomen:     Soft, upper abdominal distention, upper abdominal tenderness without rebound   Rectal:     Deferred   Extremities:   Moves all extremities, no edema, no cyanosis       Skin:   No rash, no jaundice, normal palpation       Neurologic:   Cranial nerves 2 - 12 grossly intact       Results Review:   I reviewed the patient's labs and imaging.  CBC    Results from last 7 days   Lab Units 12/03/23  1029 12/02/23  0436 12/01/23 0403 11/30/23  2304   WBC 10*3/mm3 6.10 9.20 10.30 14.30*   HEMOGLOBIN g/dL 12.5* 13.0 13.3 16.6   PLATELETS 10*3/mm3 322 397 437 609*     CMP   Results from last 7 days   Lab Units 12/03/23  0434 12/02/23  0436 12/01/23  0403 11/30/23  2304   SODIUM mmol/L 138 137 140 142   POTASSIUM mmol/L 3.9 4.2 4.4 4.3   CHLORIDE mmol/L 100 101 104 101   CO2 mmol/L 27.0 27.0 25.0 27.0   BUN mg/dL 5* 12 23* 24*   CREATININE mg/dL 0.75* 1.06 1.68* 1.85*   GLUCOSE mg/dL 91 99 133* 134*   ALBUMIN g/dL 3.0* 3.1* 3.7 4.6   BILIRUBIN mg/dL 0.5  "0.3 0.5 0.8   ALK PHOS U/L 88 92 104 139*   AST (SGOT) U/L 7 14 12 14   ALT (SGPT) U/L 10 19 22 31   MAGNESIUM mg/dL  --  1.9  --   --    LIPASE U/L  --   --  1,975* 2,138*     Cr Clearance Estimated Creatinine Clearance: 101.4 mL/min (A) (by C-G formula based on SCr of 0.75 mg/dL (L)).  Coag     HbA1C   Lab Results   Component Value Date    HGBA1C 5.10 11/30/2023     Blood Glucose No results found for: \"POCGLU\"  Infection         UA    Results from last 7 days   Lab Units 11/30/23  2309   NITRITE UA  Positive*   WBC UA /HPF 3-5*   BACTERIA UA /HPF Trace*   SQUAM EPITHEL UA /HPF 7-12*     Radiology(recent) No radiology results for the last day       ASSESSMENT:  Acute pancreatitis -likely alcohol induced  Abnormal CT showing pancreatitis with pseudocysts  Elevated LFTs RESOLVED  Alcohol abuse  Abnormal drug screen -positive cocaine and opiates  Mild normocytic anemia    PLAN:  Patient is a 43-year-old male with a history of alcohol abuse who returns to the hospital after being discharged on 11/24/2023.  Patient returns with abdominal pain.  Continues to smoke half pack of cigarettes a day.  Supposedly sober since 11/3/2023.  Hepatitis panel was negative last admission. Right upper quadrant ultrasound showed no signs of cholelithiasis or acute cholecystitis.  CBD dilation 10 mm.  Triglycerides were normal    Supportive treatment with pancreatitis including IV fluids, antiemetic and pain medications.  Okay for IV Protonix.  No need for antibiotics from GI standpoint.  Complete alcohol avoidance.  Tobacco cessation.  Recommend EUS of the pancreas with possible pseudocyst evacuation in the future.    I discussed the patients findings and my recommendations with the patient.    We appreciate the referral      Electronically signed by TARAN Conklin, 12/03/23, 5:51 PM EST.               "

## 2023-12-03 NOTE — PROGRESS NOTES
[facility] MEDICINE SERVICE  DAILY PROGRESS NOTE    NAME: Daryon Manley  : 1980  MRN: [id number]      LOS: 2 days     PROVIDER OF SERVICE: Tyrone Dawson MD    Chief Complaint: Acute pancreatitis    Subjective:     Interval History:  History taken from: patient  Patient Complaints: Lower abdominal pain , better than previous day, tolerated CLD will advance to full liquid   Patient Denies: Fever/chills or rigors    Review of Systems:   Review of Systems   Constitutional: Negative.    HENT: Negative.     Gastrointestinal:  Positive for abdominal pain. Negative for abdominal distention and blood in stool.   Genitourinary: Negative.    Neurological: Negative.        Objective:     Vital Signs  Temp:  [97.9 °F (36.6 °C)-98.3 °F (36.8 °C)] 97.9 °F (36.6 °C)  Heart Rate:  [73-77] 73  Resp:  [16-18] 18  BP: (102-115)/(68-75) 115/75   Body mass index is 16.71 kg/m².    Physical Exam  Physical Exam  Constitutional:       Appearance: Normal appearance.   HENT:      Head: Normocephalic.      Mouth/Throat:      Mouth: Mucous membranes are moist.   Eyes:      Pupils: Pupils are equal, round, and reactive to light.   Cardiovascular:      Rate and Rhythm: Normal rate and regular rhythm.   Pulmonary:      Effort: Pulmonary effort is normal.      Breath sounds: Normal breath sounds.   Abdominal:      Tenderness: There is abdominal tenderness.      Comments: Epigastric    Musculoskeletal:         General: Normal range of motion.   Skin:     General: Skin is warm.   Neurological:      General: No focal deficit present.      Mental Status: He is alert and oriented to person, place, and time.   Psychiatric:         Mood and Affect: Mood normal.         Scheduled Meds   enoxaparin, 40 mg, Subcutaneous, Q24H  pantoprazole, 40 mg, Intravenous, Q AM  senna-docusate sodium, 2 tablet, Oral, BID  sodium chloride, 10 mL, Intravenous, Q12H       PRN Meds     senna-docusate sodium **AND** polyethylene glycol **AND** bisacodyl  **AND** bisacodyl    HYDROmorphone    ondansetron    oxyCODONE    prochlorperazine    sodium chloride    sodium chloride    sodium chloride   Infusions  lactated ringers, 250 mL/hr, Last Rate: 250 mL/hr (12/03/23 1341)          Diagnostic Data    Results from last 7 days   Lab Units 12/03/23  1029 12/03/23  0434   WBC 10*3/mm3 6.10  --    HEMOGLOBIN g/dL 12.5*  --    HEMATOCRIT % 37.7  --    PLATELETS 10*3/mm3 322  --    GLUCOSE mg/dL  --  91   CREATININE mg/dL  --  0.75*   BUN mg/dL  --  5*   SODIUM mmol/L  --  138   POTASSIUM mmol/L  --  3.9   AST (SGOT) U/L  --  7   ALT (SGPT) U/L  --  10   ALK PHOS U/L  --  88   BILIRUBIN mg/dL  --  0.5   ANION GAP mmol/L  --  11.0       No radiology results for the last day      I reviewed the patient's new clinical results.    Assessment/Plan:     Active and Resolved Problems  Acute pancreatitis  Pancreatic pseudocyst from above  Abdominal pain  -Significantly elevated lipase  - CT showing acute pancreatitis with fluid collection within the pancreatic neck likely representing pseudocyst.  Recommend free fluid in the pelvis related to pancreatitis and tracking inflammatory changes.  Bowels not dilated    -Will advance diet to regular  -IV fluid hydration with Ringer lactate to 125  cc/h  -GI consulted   SINGH from volume depletion  -Continue IV fluid hydration    UTI  -s/p  ceftriaxone    DVT prophylaxis:  Medical DVT prophylaxis orders are present.     Code status is   Code Status and Medical Interventions:   Ordered at: 12/01/23 0051     Code Status (Patient has no pulse and is not breathing):    CPR (Attempt to Resuscitate)     Medical Interventions (Patient has pulse or is breathing):    Full Support       Plan for disposition:home  in 3 days    Time: 32 minutes    Signature: Electronically signed by Tyrone Dawson MD, 12/03/23, 14:25 EST.  [facility] Hospitalist Team

## 2023-12-04 ENCOUNTER — APPOINTMENT (OUTPATIENT)
Dept: CT IMAGING | Facility: HOSPITAL | Age: 43
End: 2023-12-04
Payer: COMMERCIAL

## 2023-12-04 ENCOUNTER — INPATIENT HOSPITAL (OUTPATIENT)
Dept: URBAN - METROPOLITAN AREA HOSPITAL 84 | Facility: HOSPITAL | Age: 43
End: 2023-12-04

## 2023-12-04 DIAGNOSIS — R82.5 ELEVATED URINE LEVELS OF DRUGS, MEDICAMENTS AND BIOLOGICAL S: ICD-10-CM

## 2023-12-04 DIAGNOSIS — F10.10 ALCOHOL ABUSE, UNCOMPLICATED: ICD-10-CM

## 2023-12-04 DIAGNOSIS — K86.3 PSEUDOCYST OF PANCREAS: ICD-10-CM

## 2023-12-04 DIAGNOSIS — D64.9 ANEMIA, UNSPECIFIED: ICD-10-CM

## 2023-12-04 DIAGNOSIS — R11.0 NAUSEA: ICD-10-CM

## 2023-12-04 DIAGNOSIS — K85.20 ALCOHOL INDUCED ACUTE PANCREATITIS WITHOUT NECROSIS OR INFEC: ICD-10-CM

## 2023-12-04 DIAGNOSIS — R10.10 UPPER ABDOMINAL PAIN, UNSPECIFIED: ICD-10-CM

## 2023-12-04 LAB
ALBUMIN SERPL-MCNC: 2.7 G/DL (ref 3.5–5.2)
ALBUMIN/GLOB SERPL: 1.1 G/DL
ALP SERPL-CCNC: 83 U/L (ref 39–117)
ALT SERPL W P-5'-P-CCNC: 11 U/L (ref 1–41)
ANION GAP SERPL CALCULATED.3IONS-SCNC: 12 MMOL/L (ref 5–15)
AST SERPL-CCNC: 8 U/L (ref 1–40)
BASOPHILS # BLD AUTO: 0.1 10*3/MM3 (ref 0–0.2)
BASOPHILS NFR BLD AUTO: 0.6 % (ref 0–1.5)
BILIRUB SERPL-MCNC: 0.6 MG/DL (ref 0–1.2)
BUN SERPL-MCNC: 3 MG/DL (ref 6–20)
BUN/CREAT SERPL: 4.5 (ref 7–25)
CALCIUM SPEC-SCNC: 9 MG/DL (ref 8.6–10.5)
CHLORIDE SERPL-SCNC: 100 MMOL/L (ref 98–107)
CO2 SERPL-SCNC: 25 MMOL/L (ref 22–29)
CREAT SERPL-MCNC: 0.67 MG/DL (ref 0.76–1.27)
CRP SERPL-MCNC: 17.87 MG/DL (ref 0–0.5)
DEPRECATED RDW RBC AUTO: 38.1 FL (ref 37–54)
EGFRCR SERPLBLD CKD-EPI 2021: 119.6 ML/MIN/1.73
EOSINOPHIL # BLD AUTO: 0.1 10*3/MM3 (ref 0–0.4)
EOSINOPHIL NFR BLD AUTO: 1.6 % (ref 0.3–6.2)
ERYTHROCYTE [DISTWIDTH] IN BLOOD BY AUTOMATED COUNT: 12.5 % (ref 12.3–15.4)
GLOBULIN UR ELPH-MCNC: 2.5 GM/DL
GLUCOSE SERPL-MCNC: 98 MG/DL (ref 65–99)
HCT VFR BLD AUTO: 36.8 % (ref 37.5–51)
HGB BLD-MCNC: 12.4 G/DL (ref 13–17.7)
LIPASE SERPL-CCNC: 760 U/L (ref 13–60)
LYMPHOCYTES # BLD AUTO: 1.2 10*3/MM3 (ref 0.7–3.1)
LYMPHOCYTES NFR BLD AUTO: 14.2 % (ref 19.6–45.3)
MCH RBC QN AUTO: 28.1 PG (ref 26.6–33)
MCHC RBC AUTO-ENTMCNC: 33.8 G/DL (ref 31.5–35.7)
MCV RBC AUTO: 83.1 FL (ref 79–97)
MONOCYTES # BLD AUTO: 0.7 10*3/MM3 (ref 0.1–0.9)
MONOCYTES NFR BLD AUTO: 8.6 % (ref 5–12)
NEUTROPHILS NFR BLD AUTO: 6.3 10*3/MM3 (ref 1.7–7)
NEUTROPHILS NFR BLD AUTO: 75 % (ref 42.7–76)
NRBC BLD AUTO-RTO: 0.1 /100 WBC (ref 0–0.2)
PLATELET # BLD AUTO: 285 10*3/MM3 (ref 140–450)
PMV BLD AUTO: 8.8 FL (ref 6–12)
POTASSIUM SERPL-SCNC: 3.9 MMOL/L (ref 3.5–5.2)
PROT SERPL-MCNC: 5.2 G/DL (ref 6–8.5)
RBC # BLD AUTO: 4.43 10*6/MM3 (ref 4.14–5.8)
SODIUM SERPL-SCNC: 137 MMOL/L (ref 136–145)
WBC NRBC COR # BLD AUTO: 8.4 10*3/MM3 (ref 3.4–10.8)

## 2023-12-04 PROCEDURE — 80053 COMPREHEN METABOLIC PANEL: CPT | Performed by: STUDENT IN AN ORGANIZED HEALTH CARE EDUCATION/TRAINING PROGRAM

## 2023-12-04 PROCEDURE — 25810000003 LACTATED RINGERS PER 1000 ML: Performed by: STUDENT IN AN ORGANIZED HEALTH CARE EDUCATION/TRAINING PROGRAM

## 2023-12-04 PROCEDURE — 85025 COMPLETE CBC W/AUTO DIFF WBC: CPT | Performed by: STUDENT IN AN ORGANIZED HEALTH CARE EDUCATION/TRAINING PROGRAM

## 2023-12-04 PROCEDURE — 74177 CT ABD & PELVIS W/CONTRAST: CPT

## 2023-12-04 PROCEDURE — 83690 ASSAY OF LIPASE: CPT | Performed by: NURSE PRACTITIONER

## 2023-12-04 PROCEDURE — 86140 C-REACTIVE PROTEIN: CPT | Performed by: NURSE PRACTITIONER

## 2023-12-04 PROCEDURE — 25510000001 IOPAMIDOL PER 1 ML: Performed by: STUDENT IN AN ORGANIZED HEALTH CARE EDUCATION/TRAINING PROGRAM

## 2023-12-04 PROCEDURE — 25010000002 HYDROMORPHONE 1 MG/ML SOLUTION: Performed by: STUDENT IN AN ORGANIZED HEALTH CARE EDUCATION/TRAINING PROGRAM

## 2023-12-04 PROCEDURE — 99232 SBSQ HOSP IP/OBS MODERATE 35: CPT | Performed by: NURSE PRACTITIONER

## 2023-12-04 PROCEDURE — 25010000002 ENOXAPARIN PER 10 MG: Performed by: HOSPITALIST

## 2023-12-04 RX ADMIN — SODIUM CHLORIDE, POTASSIUM CHLORIDE, SODIUM LACTATE AND CALCIUM CHLORIDE 125 ML/HR: 600; 310; 30; 20 INJECTION, SOLUTION INTRAVENOUS at 12:00

## 2023-12-04 RX ADMIN — HYDROMORPHONE HYDROCHLORIDE 1 MG: 1 INJECTION, SOLUTION INTRAMUSCULAR; INTRAVENOUS; SUBCUTANEOUS at 16:06

## 2023-12-04 RX ADMIN — SODIUM CHLORIDE, POTASSIUM CHLORIDE, SODIUM LACTATE AND CALCIUM CHLORIDE 125 ML/HR: 600; 310; 30; 20 INJECTION, SOLUTION INTRAVENOUS at 04:11

## 2023-12-04 RX ADMIN — IOPAMIDOL 75 ML: 755 INJECTION, SOLUTION INTRAVENOUS at 13:01

## 2023-12-04 RX ADMIN — HYDROMORPHONE HYDROCHLORIDE 1 MG: 1 INJECTION, SOLUTION INTRAMUSCULAR; INTRAVENOUS; SUBCUTANEOUS at 03:23

## 2023-12-04 RX ADMIN — HYDROMORPHONE HYDROCHLORIDE 1 MG: 1 INJECTION, SOLUTION INTRAMUSCULAR; INTRAVENOUS; SUBCUTANEOUS at 08:05

## 2023-12-04 RX ADMIN — ENOXAPARIN SODIUM 40 MG: 100 INJECTION SUBCUTANEOUS at 16:06

## 2023-12-04 RX ADMIN — HYDROMORPHONE HYDROCHLORIDE 1 MG: 1 INJECTION, SOLUTION INTRAMUSCULAR; INTRAVENOUS; SUBCUTANEOUS at 20:01

## 2023-12-04 RX ADMIN — PANTOPRAZOLE SODIUM 40 MG: 40 INJECTION, POWDER, FOR SOLUTION INTRAVENOUS at 06:14

## 2023-12-04 RX ADMIN — OXYCODONE 5 MG: 5 TABLET ORAL at 18:29

## 2023-12-04 RX ADMIN — Medication 10 ML: at 08:03

## 2023-12-04 RX ADMIN — SODIUM CHLORIDE, POTASSIUM CHLORIDE, SODIUM LACTATE AND CALCIUM CHLORIDE 125 ML/HR: 600; 310; 30; 20 INJECTION, SOLUTION INTRAVENOUS at 21:56

## 2023-12-04 RX ADMIN — OXYCODONE 5 MG: 5 TABLET ORAL at 04:18

## 2023-12-04 RX ADMIN — DOCUSATE SODIUM 50 MG AND SENNOSIDES 8.6 MG 2 TABLET: 8.6; 5 TABLET, FILM COATED ORAL at 20:01

## 2023-12-04 RX ADMIN — HYDROMORPHONE HYDROCHLORIDE 1 MG: 1 INJECTION, SOLUTION INTRAMUSCULAR; INTRAVENOUS; SUBCUTANEOUS at 12:00

## 2023-12-04 RX ADMIN — OXYCODONE 5 MG: 5 TABLET ORAL at 13:57

## 2023-12-04 RX ADMIN — Medication 10 ML: at 20:01

## 2023-12-04 RX ADMIN — OXYCODONE 5 MG: 5 TABLET ORAL at 22:39

## 2023-12-04 NOTE — PROGRESS NOTES
LOS: 3 days   Patient Care Team:  Provider, No Known as PCP - General      Subjective     Subjective: Patient reports continued upper abdominal pain.  Becomes very nauseous with liquid intake although no vomiting.  No bowel movements.      ROS:   Review of Systems   Constitutional:  Negative for chills and fever.   Respiratory:  Negative for cough and shortness of breath.    Cardiovascular:  Negative for chest pain.   Gastrointestinal:  Positive for abdominal distention, abdominal pain and nausea. Negative for vomiting.   Psychiatric/Behavioral:  Negative for agitation and confusion.         Medication Review:   Scheduled Meds:enoxaparin, 40 mg, Subcutaneous, Q24H  pantoprazole, 40 mg, Intravenous, Q AM  senna-docusate sodium, 2 tablet, Oral, BID  sodium chloride, 10 mL, Intravenous, Q12H      Continuous Infusions:lactated ringers, 125 mL/hr, Last Rate: 125 mL/hr (12/04/23 1200)      PRN Meds:.  senna-docusate sodium **AND** polyethylene glycol **AND** bisacodyl **AND** bisacodyl    HYDROmorphone    ondansetron    oxyCODONE    prochlorperazine    sodium chloride    sodium chloride    sodium chloride      Objective     Vital Signs  Temp:  [98 °F (36.7 °C)-99 °F (37.2 °C)] 98.4 °F (36.9 °C)  Heart Rate:  [70-79] 70  Resp:  [12-18] 16  BP: (110-136)/(73-87) 136/87    Physical Exam:    General Appearance:    Awake and alert, in no acute distress   Head:    Normocephalic, without obvious abnormality   Eyes:          Conjunctivae normal, anicteric sclera   Ears:    Hearing intact   Throat:   No oral lesions, no thrush, oral mucosa moist   Neck:   No adenopathy, supple, no JVD   Lungs:    Respirations regular, even and unlabored       Abdomen:     Soft, tenderness across upper abdomen and distended, no rebound or guarding   Rectal:     Deferred   Extremities:   No edema, no cyanosis, no redness   Skin:   No bleeding, bruising or rash, no jaundice   Neurologic:   Sensation intact        Results Review:    CBC  Results from  last 7 days   Lab Units 12/04/23  0505 12/03/23  1029 12/02/23  0436 12/01/23  0403 11/30/23  2304   RBC 10*6/mm3 4.43 4.53 4.70 4.73 5.90*   WBC 10*3/mm3 8.40 6.10 9.20 10.30 14.30*   HEMOGLOBIN g/dL 12.4* 12.5* 13.0 13.3 16.6   PLATELETS 10*3/mm3 285 322 397 437 609*       CMP  Results from last 7 days   Lab Units 12/04/23  0846 12/03/23  0434 12/02/23  0436 12/01/23  0403 11/30/23  2304   SODIUM mmol/L 137 138 137 140 142   POTASSIUM mmol/L 3.9 3.9 4.2 4.4 4.3   CHLORIDE mmol/L 100 100 101 104 101   CO2 mmol/L 25.0 27.0 27.0 25.0 27.0   BUN mg/dL 3* 5* 12 23* 24*   CREATININE mg/dL 0.67* 0.75* 1.06 1.68* 1.85*   GLUCOSE mg/dL 98 91 99 133* 134*   ALBUMIN g/dL 2.7* 3.0* 3.1* 3.7 4.6   BILIRUBIN mg/dL 0.6 0.5 0.3 0.5 0.8   ALK PHOS U/L 83 88 92 104 139*   AST (SGOT) U/L 8 7 14 12 14   ALT (SGPT) U/L 11 10 19 22 31       Amylase and Lipase  Results from last 7 days   Lab Units 12/01/23  0403 11/30/23  2304   LIPASE U/L 1,975* 2,138*       CRP     Results from last 7 days   Lab Units 12/04/23  0846 12/03/23  1821   CRP mg/dL 17.87* 18.84*       Imaging Results (Last 24 Hours)       Procedure Component Value Units Date/Time    CT Abdomen Pelvis With Contrast [[id number]] Resulted: 12/04/23 1258     Updated: 12/04/23 1258              Assessment & Plan   Patient is a 43-year-old male with a history of alcohol abuse who returns to the hospital after being discharged on 11/24/2023.  Patient returns with abdominal pain.  Continues to smoke half pack of cigarettes a day.  Reports sober since 11/3/2023.  Hepatitis panel was negative last admission. Right upper quadrant ultrasound showed no signs of cholelithiasis or acute cholecystitis.  CBD dilation 10 mm.  Triglycerides were normal.  CT shows development of fluid collection in the pancreas    Acute pancreatitis -likely alcohol induced  Abnormal CT showing pancreatitis with pseudocysts  Elevated LFTs RESOLVED  Alcohol abuse  Abnormal drug screen -positive cocaine and  opiates  Mild normocytic anemia     PLAN:  Patient reports continued upper abdominal pain with no improvement.  Also feels nauseous and very full with only small amount of liquids.  CRP 17.87.  Liver enzymes normal.  WBC is also normal.  Hemoglobin 12.4.  Repeat CT of the abdomen/pelvis with contrast has been ordered.  Will follow-up on lipase and also follow-up with her labs as well as CT results.  Continue IV PPI and supportive care.  Needs complete alcohol and tobacco cessation.      Noemí Espinoza, TARAN  12/04/23  13:01 EST

## 2023-12-04 NOTE — CASE MANAGEMENT/SOCIAL WORK
Continued Stay Note  [facility]     Patient Name: Dayron Manley  MRN: [id number]  Today's Date: 12/4/2023    Admit Date: 11/30/2023    Plan: Return home alone. Meds to Bed   Discharge Plan       Row Name 12/04/23 1146       Plan    Plan Return home alone. Meds to Bed    Plan Comments Barriers: Pending CT scan abdomen. GI following, IV Protonix. He plans to return home at discharge. He did not want CM making a PCP appointment for  him but contact info given to him for [facility] groups                           Phone communication or documentation only - no physical contact with patient or family.   Lorena PEREZN,RN Case Manager  [facility]  Phone: Desk- [phone] cell- [phone]

## 2023-12-04 NOTE — PROGRESS NOTES
[facility] MEDICINE SERVICE  DAILY PROGRESS NOTE    NAME: Dayron Manley  : 1980  MRN: [id number]      LOS: 3 days     PROVIDER OF SERVICE: Tyroen Dawson MD    Chief Complaint: Acute pancreatitis    Subjective:     Interval History:  History taken from: patient  Patient Complaints: still abd pain  with satiety , temporary relief with hydromorphone.  Repeat CT showed increase in the pseudocyst size  Patient Denies: Fever/chills or rigors    Review of Systems:   Review of Systems   Constitutional: Negative.    HENT: Negative.     Gastrointestinal:  Positive for abdominal pain. Negative for abdominal distention and blood in stool.   Genitourinary: Negative.    Neurological: Negative.        Objective:     Vital Signs  Temp:  [98 °F (36.7 °C)-99 °F (37.2 °C)] 98.4 °F (36.9 °C)  Heart Rate:  [70-79] 70  Resp:  [12-18] 16  BP: (110-136)/(73-87) 136/87   Body mass index is 16.71 kg/m².    Physical Exam  Physical Exam  Constitutional:       Appearance: Normal appearance.   HENT:      Head: Normocephalic.      Mouth/Throat:      Mouth: Mucous membranes are moist.   Eyes:      Pupils: Pupils are equal, round, and reactive to light.   Cardiovascular:      Rate and Rhythm: Normal rate and regular rhythm.   Pulmonary:      Effort: Pulmonary effort is normal.      Breath sounds: Normal breath sounds.   Abdominal:      Tenderness: There is abdominal tenderness.      Comments: Epigastric    Musculoskeletal:         General: Normal range of motion.   Skin:     General: Skin is warm.   Neurological:      General: No focal deficit present.      Mental Status: He is alert and oriented to person, place, and time.   Psychiatric:         Mood and Affect: Mood normal.         Scheduled Meds   enoxaparin, 40 mg, Subcutaneous, Q24H  pantoprazole, 40 mg, Intravenous, Q AM  senna-docusate sodium, 2 tablet, Oral, BID  sodium chloride, 10 mL, Intravenous, Q12H       PRN Meds     senna-docusate sodium **AND** polyethylene  glycol **AND** bisacodyl **AND** bisacodyl    HYDROmorphone    ondansetron    oxyCODONE    prochlorperazine    sodium chloride    sodium chloride    sodium chloride   Infusions  lactated ringers, 125 mL/hr, Last Rate: 125 mL/hr (12/04/23 1200)          Diagnostic Data    Results from last 7 days   Lab Units 12/04/23  0846 12/04/23  0505   WBC 10*3/mm3  --  8.40   HEMOGLOBIN g/dL  --  12.4*   HEMATOCRIT %  --  36.8*   PLATELETS 10*3/mm3  --  285   GLUCOSE mg/dL 98  --    CREATININE mg/dL 0.67*  --    BUN mg/dL 3*  --    SODIUM mmol/L 137  --    POTASSIUM mmol/L 3.9  --    AST (SGOT) U/L 8  --    ALT (SGPT) U/L 11  --    ALK PHOS U/L 83  --    BILIRUBIN mg/dL 0.6  --    ANION GAP mmol/L 12.0  --        CT Abdomen Pelvis With Contrast    Result Date: 12/4/2023  Impression: 1. Findings compatible with pancreatitis with slight interval increase in size of a pancreatic pseudocyst measuring 4.8 x 5.7 cm. This cyst is contiguous with smaller fluid collections extending into the head and uncinate process of the pancreas. 2. Interval increase in abdominal and pelvic ascites with additional bilateral pleural effusions now noted. Electronically Signed: Lluvia Castle MD  12/4/2023 12:11 PM CST  Workstation ID: [id number]       I reviewed the patient's new clinical results.    Assessment/Plan:     Active and Resolved Problems  Acute pancreatitis  Pancreatic pseudocyst from above  Abdominal pain  -Significantly elevated lipase  - CT showing acute pancreatitis with fluid collection within the pancreatic neck likely representing pseudocyst.  Recommend free fluid in the pelvis related to pancreatitis and tracking inflammatory changes.  Bowels not dilated    -Repeat CT scan on 12/4/2023 showed increase in the size of the pseudocyst    - on low fat and soft GI diet   -IV fluid hydration with Ringer lactate to 125  cc/h  -GI consult appreciated       SINGH from volume depletion- resolved   -Continue IV fluid hydration    UTI  -s/p   ceftriaxone    DVT prophylaxis:  Medical DVT prophylaxis orders are present.     Code status is   Code Status and Medical Interventions:   Ordered at: 12/01/23 0051     Code Status (Patient has no pulse and is not breathing):    CPR (Attempt to Resuscitate)     Medical Interventions (Patient has pulse or is breathing):    Full Support       Plan for disposition:home pending clinical improvement currently requiring IV narcotics for pain control and pseudocyst size increasing    Time: 32 minutes    Signature: Electronically signed by Tyrone Dawson MD, 12/04/23, 14:18 EST.  [facility] Hospitalist Team

## 2023-12-04 NOTE — PROGRESS NOTES
"Nutrition Services    Patient Name: Dayron Manley  YOB: 1980  MRN: [id number]  Admission date: 11/30/2023    PPE Documentation        PPE Worn By Provider Did not enter room for this encounter   PPE Worn By Patient  N/A     PROGRESS NOTE      Encounter Information: Checking in on PO intake and diet advancement. Fat restricted diet initiated on 12/3.        PO Diet: Diet: Gastrointestinal Diets; Fat-Restricted; Fluid Consistency: Thin (IDDSI 0)   PO Supplements: Boost Breeze BID (provides 500 kcals, 18 g protein if consumed)      PO Intake:  75%       Current nutrition support: -   Nutrition support review: -       Labs (reviewed below): Reviewed, management per attending         GI Function:  + BM on 11/30; scheduled pericolace in place (multiple missed doses), PRN bowel regimen available.       Nutrition Intervention Updates: Continue current diet and ONS as tolerated. Encourage good PO intake.        Results from last 7 days   Lab Units 12/04/23  0846 12/03/23  0434 12/02/23  0436   SODIUM mmol/L 137 138 137   POTASSIUM mmol/L 3.9 3.9 4.2   CHLORIDE mmol/L 100 100 101   CO2 mmol/L 25.0 27.0 27.0   BUN mg/dL 3* 5* 12   CREATININE mg/dL 0.67* 0.75* 1.06   CALCIUM mg/dL 9.0 8.6 8.6   BILIRUBIN mg/dL 0.6 0.5 0.3   ALK PHOS U/L 83 88 92   ALT (SGPT) U/L 11 10 19   AST (SGOT) U/L 8 7 14   GLUCOSE mg/dL 98 91 99     Results from last 7 days   Lab Units 12/04/23  0505 12/03/23  1029 12/02/23  0436 12/01/23  0403   MAGNESIUM mg/dL  --   --  1.9  --    HEMOGLOBIN g/dL 12.4*   < > 13.0 13.3   HEMATOCRIT % 36.8*   < > 39.7 39.3   TRIGLYCERIDES mg/dL  --   --   --  124    < > = values in this interval not displayed.     No results found for: \"COVID19\"  Lab Results   Component Value Date    HGBA1C 5.10 11/30/2023       RD to follow up per protocol.    Electronically signed by:  Asya Zavala RD  12/04/23 13:05 EST    "

## 2023-12-04 NOTE — CASE MANAGEMENT/SOCIAL WORK
Case Management Readmission Assessment Note    Case Management Readmission Assessment (all recorded)       Readmission Interview       Row Name 12/04/23 1149             Readmission Indications    Is this hospitalization related to the prior hospital diagnosis? Yes      What was the reason you were admitted? abdominal pain, nausea        [facility] Name 12/04/23 1149             Recommendation for rehospitalization    Did you speak with your physician prior to coming to the hospital No      Did you seek care elsewhere prior to coming to the hospital? No        [facility] Name 12/04/23 1149             Follow up appointment    Do you have a PCP? No      Did you have an appointment with PCP/specialist after hospitalization within 7 days? No      Did you keep your appointment? No      If you did not keep appointment, why? Other (comment)  NO PCP and refused CM to make appointment        [facility] Name 12/04/23 1149             Medications    Did you have newly prescribed medications at discharge? Yes      Did you understand the reasons for your medications at discharge and how to take them? Yes      Did you understand the side effects of your medications? Yes      Are you taking all of you prescribed medications? Yes        [facility] Name 12/04/23 1149             Discharge Instructions    Did you understand your discharge instructions? Yes      Did your family/caregiver hear your instructions? No      Were you told to eat a special diet? Yes      Did you adhere to the diet? Yes      Were you given a number of someone to call if you had questions or concerns? Yes        [facility] Name 12/04/23 1149             Index discharge location/services    Where did you go upon discharge? Home      Do you have supportive family or friends in the home? No        [facility] Name 12/04/23 1149             Discharge Readiness    On a scale of 1-5 (5 being well prepared), how ready were you for discharge 3      Recommendation based on interview Education on diagnosis/self  management

## 2023-12-04 NOTE — PLAN OF CARE
Problem: Adult Inpatient Plan of Care  Goal: Plan of Care Review  Outcome: Ongoing, Progressing  Flowsheets (Taken 12/4/2023 0654)  Progress: no change  Plan of Care Reviewed With: patient  Outcome Evaluation: Pt complains of abdominal pain throughout night, see MAR for treatment. Will continue plan of care.  Goal: Patient-Specific Goal (Individualized)  Outcome: Ongoing, Progressing  Goal: Absence of Hospital-Acquired Illness or Injury  Outcome: Ongoing, Progressing  Intervention: Identify and Manage Fall Risk  Recent Flowsheet Documentation  Taken 12/4/2023 0600 by Erika Condon RN  Safety Promotion/Fall Prevention: safety round/check completed  Taken 12/4/2023 0418 by Erika Condon RN  Safety Promotion/Fall Prevention: safety round/check completed  Taken 12/4/2023 0200 by Erika Condon RN  Safety Promotion/Fall Prevention: safety round/check completed  Taken 12/4/2023 0000 by Erika Condon RN  Safety Promotion/Fall Prevention: safety round/check completed  Taken 12/3/2023 2200 by Erika Condon RN  Safety Promotion/Fall Prevention: safety round/check completed  Taken 12/3/2023 2000 by Erika Condon RN  Safety Promotion/Fall Prevention: safety round/check completed  Intervention: Prevent Skin Injury  Recent Flowsheet Documentation  Taken 12/4/2023 0000 by Erika Condon RN  Body Position: position changed independently  Taken 12/3/2023 2000 by Erika Condon RN  Body Position: position changed independently  Skin Protection: adhesive use limited  Intervention: Prevent and Manage VTE (Venous Thromboembolism) Risk  Recent Flowsheet Documentation  Taken 12/4/2023 0418 by Erika Condon RN  Activity Management: up ad sina  Taken 12/3/2023 2000 by Erika Cnodon RN  Activity Management: up ad sina  Intervention: Prevent Infection  Recent Flowsheet Documentation  Taken 12/4/2023 0418 by Erika Condon RN  Infection Prevention:   hand hygiene promoted   rest/sleep  promoted   single patient room provided  Taken 12/4/2023 0000 by Erika Condon RN  Infection Prevention:   hand hygiene promoted   rest/sleep promoted   single patient room provided  Taken 12/3/2023 2000 by Erika Condon RN  Infection Prevention:   hand hygiene promoted   rest/sleep promoted   single patient room provided  Goal: Optimal Comfort and Wellbeing  Outcome: Ongoing, Progressing  Intervention: Monitor Pain and Promote Comfort  Recent Flowsheet Documentation  Taken 12/4/2023 0418 by Erika Condon RN  Pain Management Interventions: see MAR  Taken 12/3/2023 2350 by Erika Condon RN  Pain Management Interventions: see MAR  Taken 12/3/2023 1923 by Erika Condon RN  Pain Management Interventions: see MAR  Intervention: Provide Person-Centered Care  Recent Flowsheet Documentation  Taken 12/3/2023 2000 by Erika Condon RN  Trust Relationship/Rapport:   care explained   choices provided   emotional support provided   empathic listening provided   questions answered   questions encouraged   reassurance provided   thoughts/feelings acknowledged  Goal: Readiness for Transition of Care  Outcome: Ongoing, Progressing     Problem: Pain Acute  Goal: Acceptable Pain Control and Functional Ability  Outcome: Ongoing, Progressing  Intervention: Prevent or Manage Pain  Recent Flowsheet Documentation  Taken 12/3/2023 2000 by Erika Condon RN  Sensory Stimulation Regulation: lighting decreased  Sleep/Rest Enhancement: awakenings minimized  Medication Review/Management: medications reviewed  Intervention: Develop Pain Management Plan  Recent Flowsheet Documentation  Taken 12/4/2023 0418 by Erika Condon RN  Pain Management Interventions: see MAR  Taken 12/3/2023 2350 by Erika Condon RN  Pain Management Interventions: see MAR  Taken 12/3/2023 1923 by Erika Condon RN  Pain Management Interventions: see MAR  Intervention: Optimize Psychosocial Wellbeing  Recent Flowsheet  Documentation  Taken 12/3/2023 2000 by Erika Condon RN  Supportive Measures: active listening utilized  Diversional Activities:   television   smartphone     Problem: Fall Injury Risk  Goal: Absence of Fall and Fall-Related Injury  Outcome: Ongoing, Progressing  Intervention: Identify and Manage Contributors  Recent Flowsheet Documentation  Taken 12/3/2023 2000 by Erika Condon RN  Medication Review/Management: medications reviewed  Intervention: Promote Injury-Free Environment  Recent Flowsheet Documentation  Taken 12/4/2023 0600 by Erika Condon RN  Safety Promotion/Fall Prevention: safety round/check completed  Taken 12/4/2023 0418 by Erika Condon RN  Safety Promotion/Fall Prevention: safety round/check completed  Taken 12/4/2023 0200 by Erika Condon RN  Safety Promotion/Fall Prevention: safety round/check completed  Taken 12/4/2023 0000 by Erika Condon RN  Safety Promotion/Fall Prevention: safety round/check completed  Taken 12/3/2023 2200 by Erika Condon RN  Safety Promotion/Fall Prevention: safety round/check completed  Taken 12/3/2023 2000 by Erika Condon RN  Safety Promotion/Fall Prevention: safety round/check completed     Problem: Nausea and Vomiting  Goal: Fluid and Electrolyte Balance  Outcome: Ongoing, Progressing  Intervention: Monitor and Manage Hypovolemia  Recent Flowsheet Documentation  Taken 12/3/2023 2000 by Erika Condon RN  Fluid/Electrolyte Management: fluids provided  Intervention: Prevent and Manage Nausea and Vomiting  Recent Flowsheet Documentation  Taken 12/3/2023 2000 by Erika Condon RN  Environmental Support: calm environment promoted   Goal Outcome Evaluation:  Plan of Care Reviewed With: patient        Progress: no change  Outcome Evaluation: Pt complains of abdominal pain throughout night, see MAR for treatment. Will continue plan of care.

## 2023-12-05 ENCOUNTER — INPATIENT HOSPITAL (OUTPATIENT)
Dept: URBAN - METROPOLITAN AREA HOSPITAL 84 | Facility: HOSPITAL | Age: 43
End: 2023-12-05

## 2023-12-05 DIAGNOSIS — F10.10 ALCOHOL ABUSE, UNCOMPLICATED: ICD-10-CM

## 2023-12-05 DIAGNOSIS — K86.3 PSEUDOCYST OF PANCREAS: ICD-10-CM

## 2023-12-05 DIAGNOSIS — K85.20 ALCOHOL INDUCED ACUTE PANCREATITIS WITHOUT NECROSIS OR INFEC: ICD-10-CM

## 2023-12-05 DIAGNOSIS — R11.0 NAUSEA: ICD-10-CM

## 2023-12-05 DIAGNOSIS — R10.84 GENERALIZED ABDOMINAL PAIN: ICD-10-CM

## 2023-12-05 DIAGNOSIS — D64.9 ANEMIA, UNSPECIFIED: ICD-10-CM

## 2023-12-05 DIAGNOSIS — F17.210 NICOTINE DEPENDENCE, CIGARETTES, UNCOMPLICATED: ICD-10-CM

## 2023-12-05 LAB
ALBUMIN SERPL-MCNC: 3.2 G/DL (ref 3.5–5.2)
ALBUMIN/GLOB SERPL: 1 G/DL
ALP SERPL-CCNC: 99 U/L (ref 39–117)
ALT SERPL W P-5'-P-CCNC: 14 U/L (ref 1–41)
ANION GAP SERPL CALCULATED.3IONS-SCNC: 13 MMOL/L (ref 5–15)
AST SERPL-CCNC: 13 U/L (ref 1–40)
BASOPHILS # BLD AUTO: 0.1 10*3/MM3 (ref 0–0.2)
BASOPHILS NFR BLD AUTO: 0.9 % (ref 0–1.5)
BILIRUB SERPL-MCNC: 0.8 MG/DL (ref 0–1.2)
BUN SERPL-MCNC: 3 MG/DL (ref 6–20)
BUN/CREAT SERPL: 3.4 (ref 7–25)
CALCIUM SPEC-SCNC: 9 MG/DL (ref 8.6–10.5)
CHLORIDE SERPL-SCNC: 94 MMOL/L (ref 98–107)
CO2 SERPL-SCNC: 27 MMOL/L (ref 22–29)
CREAT SERPL-MCNC: 0.87 MG/DL (ref 0.76–1.27)
CRP SERPL-MCNC: 24.66 MG/DL (ref 0–0.5)
DEPRECATED RDW RBC AUTO: 39.8 FL (ref 37–54)
EGFRCR SERPLBLD CKD-EPI 2021: 110.5 ML/MIN/1.73
EOSINOPHIL # BLD AUTO: 0.1 10*3/MM3 (ref 0–0.4)
EOSINOPHIL NFR BLD AUTO: 1.9 % (ref 0.3–6.2)
ERYTHROCYTE [DISTWIDTH] IN BLOOD BY AUTOMATED COUNT: 12.9 % (ref 12.3–15.4)
GLOBULIN UR ELPH-MCNC: 3.3 GM/DL
GLUCOSE SERPL-MCNC: 89 MG/DL (ref 65–99)
HCT VFR BLD AUTO: 41.5 % (ref 37.5–51)
HGB BLD-MCNC: 14 G/DL (ref 13–17.7)
LIPASE SERPL-CCNC: 748 U/L (ref 13–60)
LYMPHOCYTES # BLD AUTO: 0.5 10*3/MM3 (ref 0.7–3.1)
LYMPHOCYTES NFR BLD AUTO: 8.3 % (ref 19.6–45.3)
MCH RBC QN AUTO: 28 PG (ref 26.6–33)
MCHC RBC AUTO-ENTMCNC: 33.7 G/DL (ref 31.5–35.7)
MCV RBC AUTO: 83.2 FL (ref 79–97)
MONOCYTES # BLD AUTO: 0.4 10*3/MM3 (ref 0.1–0.9)
MONOCYTES NFR BLD AUTO: 5.8 % (ref 5–12)
NEUTROPHILS NFR BLD AUTO: 5.3 10*3/MM3 (ref 1.7–7)
NEUTROPHILS NFR BLD AUTO: 83.1 % (ref 42.7–76)
NRBC BLD AUTO-RTO: 0 /100 WBC (ref 0–0.2)
PLATELET # BLD AUTO: 373 10*3/MM3 (ref 140–450)
PMV BLD AUTO: 8.7 FL (ref 6–12)
POTASSIUM SERPL-SCNC: 4.1 MMOL/L (ref 3.5–5.2)
PROT SERPL-MCNC: 6.5 G/DL (ref 6–8.5)
RBC # BLD AUTO: 4.99 10*6/MM3 (ref 4.14–5.8)
SODIUM SERPL-SCNC: 134 MMOL/L (ref 136–145)
WBC NRBC COR # BLD AUTO: 6.4 10*3/MM3 (ref 3.4–10.8)

## 2023-12-05 PROCEDURE — 25810000003 LACTATED RINGERS PER 1000 ML: Performed by: INTERNAL MEDICINE

## 2023-12-05 PROCEDURE — 86140 C-REACTIVE PROTEIN: CPT | Performed by: NURSE PRACTITIONER

## 2023-12-05 PROCEDURE — 80053 COMPREHEN METABOLIC PANEL: CPT | Performed by: STUDENT IN AN ORGANIZED HEALTH CARE EDUCATION/TRAINING PROGRAM

## 2023-12-05 PROCEDURE — 99232 SBSQ HOSP IP/OBS MODERATE 35: CPT | Performed by: NURSE PRACTITIONER

## 2023-12-05 PROCEDURE — 85025 COMPLETE CBC W/AUTO DIFF WBC: CPT | Performed by: STUDENT IN AN ORGANIZED HEALTH CARE EDUCATION/TRAINING PROGRAM

## 2023-12-05 PROCEDURE — 83690 ASSAY OF LIPASE: CPT | Performed by: NURSE PRACTITIONER

## 2023-12-05 PROCEDURE — 25010000002 ENOXAPARIN PER 10 MG: Performed by: HOSPITALIST

## 2023-12-05 PROCEDURE — 25010000002 HYDROMORPHONE 1 MG/ML SOLUTION: Performed by: STUDENT IN AN ORGANIZED HEALTH CARE EDUCATION/TRAINING PROGRAM

## 2023-12-05 RX ORDER — BISACODYL 10 MG
10 SUPPOSITORY, RECTAL RECTAL DAILY PRN
Status: DISCONTINUED | OUTPATIENT
Start: 2023-12-05 | End: 2023-12-15

## 2023-12-05 RX ORDER — POLYETHYLENE GLYCOL 3350 17 G/17G
17 POWDER, FOR SOLUTION ORAL DAILY
Status: DISCONTINUED | OUTPATIENT
Start: 2023-12-05 | End: 2023-12-15

## 2023-12-05 RX ORDER — AMOXICILLIN 250 MG
2 CAPSULE ORAL 2 TIMES DAILY
Status: DISCONTINUED | OUTPATIENT
Start: 2023-12-05 | End: 2023-12-15

## 2023-12-05 RX ORDER — OXYCODONE HYDROCHLORIDE 5 MG/1
10 TABLET ORAL EVERY 6 HOURS
Status: DISCONTINUED | OUTPATIENT
Start: 2023-12-05 | End: 2023-12-07

## 2023-12-05 RX ORDER — BISACODYL 5 MG/1
5 TABLET, DELAYED RELEASE ORAL DAILY PRN
Status: DISCONTINUED | OUTPATIENT
Start: 2023-12-05 | End: 2023-12-15

## 2023-12-05 RX ADMIN — HYDROMORPHONE HYDROCHLORIDE 1 MG: 1 INJECTION, SOLUTION INTRAMUSCULAR; INTRAVENOUS; SUBCUTANEOUS at 00:30

## 2023-12-05 RX ADMIN — OXYCODONE 10 MG: 5 TABLET ORAL at 21:56

## 2023-12-05 RX ADMIN — HYDROMORPHONE HYDROCHLORIDE 1 MG: 1 INJECTION, SOLUTION INTRAMUSCULAR; INTRAVENOUS; SUBCUTANEOUS at 12:05

## 2023-12-05 RX ADMIN — OXYCODONE 10 MG: 5 TABLET ORAL at 14:28

## 2023-12-05 RX ADMIN — SODIUM CHLORIDE, POTASSIUM CHLORIDE, SODIUM LACTATE AND CALCIUM CHLORIDE 75 ML/HR: 600; 310; 30; 20 INJECTION, SOLUTION INTRAVENOUS at 17:56

## 2023-12-05 RX ADMIN — ENOXAPARIN SODIUM 40 MG: 100 INJECTION SUBCUTANEOUS at 16:13

## 2023-12-05 RX ADMIN — PANTOPRAZOLE SODIUM 40 MG: 40 INJECTION, POWDER, FOR SOLUTION INTRAVENOUS at 06:03

## 2023-12-05 RX ADMIN — OXYCODONE 10 MG: 5 TABLET ORAL at 11:10

## 2023-12-05 RX ADMIN — Medication 10 ML: at 09:00

## 2023-12-05 RX ADMIN — DOCUSATE SODIUM 50 MG AND SENNOSIDES 8.6 MG 2 TABLET: 8.6; 5 TABLET, FILM COATED ORAL at 08:01

## 2023-12-05 RX ADMIN — HYDROMORPHONE HYDROCHLORIDE 1 MG: 1 INJECTION, SOLUTION INTRAMUSCULAR; INTRAVENOUS; SUBCUTANEOUS at 06:01

## 2023-12-05 RX ADMIN — HYDROMORPHONE HYDROCHLORIDE 1 MG: 1 INJECTION, SOLUTION INTRAMUSCULAR; INTRAVENOUS; SUBCUTANEOUS at 16:13

## 2023-12-05 RX ADMIN — HYDROMORPHONE HYDROCHLORIDE 1 MG: 1 INJECTION, SOLUTION INTRAMUSCULAR; INTRAVENOUS; SUBCUTANEOUS at 20:22

## 2023-12-05 RX ADMIN — POLYETHYLENE GLYCOL 3350 17 G: 17 POWDER, FOR SOLUTION ORAL at 08:01

## 2023-12-05 NOTE — PROGRESS NOTES
[facility] MEDICINE SERVICE  DAILY PROGRESS NOTE    NAME: Dayron Manley  : 1980  MRN: [id number]      LOS: 4 days     PROVIDER OF SERVICE: Vargas Sotelo DO    Chief Complaint: Acute pancreatitis    Subjective:     Interval History:  History taken from: patient. The patient continues to complain of ongoing pain in his abdomen. He states that the pain medication orally is not strong enough. He takes Dilaudid 1mg IV Q3hrs for the pain as well which works better. He had a bowel movement last night. No nausea or vomiting. The patient is voiding well. No diarrhea. No light headedness or dizziness. Case was discussed with the nursing staff as well. Patient is able to ambulate and follow commands. No other issues over the course of the night. CT done yesterday shows pseudocyst has increased in size.     Review of Systems:   Review of Systems   Constitutional: Negative.    HENT: Negative.     Gastrointestinal:  Positive for abdominal pain. Negative for abdominal distention and blood in stool.   Genitourinary: Negative.    Neurological: Negative.        Objective:     Vital Signs  Temp:  [98.3 °F (36.8 °C)-99.2 °F (37.3 °C)] 99 °F (37.2 °C)  Heart Rate:  [70-88] 88  Resp:  [14-20] 20  BP: (121-137)/(69-87) 137/78   Body mass index is 16.71 kg/m².    Physical Exam  Alert, oriented, pleasant appears to have ongoing pain in his abdomen.   Heart - regular rate and rhythm  Lungs - CTA no wheeze and no ronchi  Abdomen - soft, he continues to have ongoing pain with palpation. Bowel sounds are present. No rebound. He does have some guarding.   Extremities - no edema. He does state that he feels swollen.     Scheduled Meds   enoxaparin, 40 mg, Subcutaneous, Q24H  pantoprazole, 40 mg, Intravenous, Q AM  senna-docusate sodium, 2 tablet, Oral, BID  sodium chloride, 10 mL, Intravenous, Q12H       PRN Meds     senna-docusate sodium **AND** polyethylene glycol **AND** bisacodyl **AND** bisacodyl    HYDROmorphone     ondansetron    oxyCODONE    prochlorperazine    sodium chloride    sodium chloride    sodium chloride   Infusions  lactated ringers, 125 mL/hr, Last Rate: 125 mL/hr (12/04/23 2156)          Diagnostic Data    Results from last 7 days   Lab Units 12/05/23  0535   WBC 10*3/mm3 6.40   HEMOGLOBIN g/dL 14.0   HEMATOCRIT % 41.5   PLATELETS 10*3/mm3 373   GLUCOSE mg/dL 89   CREATININE mg/dL 0.87   BUN mg/dL 3*   SODIUM mmol/L 134*   POTASSIUM mmol/L 4.1   AST (SGOT) U/L 13   ALT (SGPT) U/L 14   ALK PHOS U/L 99   BILIRUBIN mg/dL 0.8   ANION GAP mmol/L 13.0       CT Abdomen Pelvis With Contrast    Result Date: 12/4/2023  Impression: 1. Findings compatible with pancreatitis with slight interval increase in size of a pancreatic pseudocyst measuring 4.8 x 5.7 cm. This cyst is contiguous with smaller fluid collections extending into the head and uncinate process of the pancreas. 2. Interval increase in abdominal and pelvic ascites with additional bilateral pleural effusions now noted. Electronically Signed: Lluvia Castle MD  12/4/2023 12:11 PM CST  Workstation ID: [id number]       I reviewed the patient's new clinical results.    Assessment/Plan:     Active and Resolved Problems  Acute pancreatitis  Pancreatic pseudocyst from above  Abdominal pain  -Significantly elevated lipase  - CT showing acute pancreatitis with fluid collection within the pancreatic neck likely representing pseudocyst.  Recommend free fluid in the pelvis related to pancreatitis and tracking inflammatory changes.  Bowels not dilated    -Repeat CT scan on 12/4/2023 showed increase in the size of the pseudocyst    - on low fat and soft GI diet   -decrease fluid hydration. Patient is voiding multiple times per day currently and urine is clear. He is tolerating liquids.   -GI consult appreciated       SINGH from volume depletion- resolved   -Continue IV fluid hydration    UTI  -s/p  ceftriaxone    DVT prophylaxis:  Medical DVT prophylaxis orders are present.      Constipation - patient would benefit from scheduled stool softeners with his ongoing narcotic use. I will schedule the patient on senna-s two tablets P.O BID and miralax 17g P.O Qday. Discussed with patient trying to increase oral pain medications and attempt to taper the dilaudid if possible.     Code status is   Code Status and Medical Interventions:   Ordered at: 12/01/23 0051     Code Status (Patient has no pulse and is not breathing):    CPR (Attempt to Resuscitate)     Medical Interventions (Patient has pulse or is breathing):    Full Support       Plan for disposition:home pending clinical improvement currently requiring IV narcotics for pain control and pseudocyst size increasing    Time: 32 minutes    Signature: Electronically signed by Vargas Sotelo DO, 12/05/23, 07:39 EST.  [facility] Hospitalist Team

## 2023-12-05 NOTE — PLAN OF CARE
Goal Outcome Evaluation:     Problem: Adult Inpatient Plan of Care  Goal: Plan of Care Review  Outcome: Ongoing, Progressing  Goal: Patient-Specific Goal (Individualized)  Outcome: Ongoing, Progressing  Goal: Absence of Hospital-Acquired Illness or Injury  Outcome: Ongoing, Progressing  Intervention: Identify and Manage Fall Risk  Recent Flowsheet Documentation  Taken 12/5/2023 0600 by Chelsea Babcock RN  Safety Promotion/Fall Prevention:   safety round/check completed   room organization consistent  Taken 12/5/2023 0400 by Chelsea Babcock RN  Safety Promotion/Fall Prevention:   safety round/check completed   room organization consistent  Taken 12/5/2023 0200 by Chelsea Babcock RN  Safety Promotion/Fall Prevention:   safety round/check completed   room organization consistent  Taken 12/5/2023 0030 by Chelsea Babcock RN  Safety Promotion/Fall Prevention:   safety round/check completed   room organization consistent  Taken 12/4/2023 2200 by Chelsea Babcock RN  Safety Promotion/Fall Prevention:   safety round/check completed   room organization consistent   clutter free environment maintained  Taken 12/4/2023 2001 by Chelsea Babcock RN  Safety Promotion/Fall Prevention:   safety round/check completed   room organization consistent   clutter free environment maintained  Intervention: Prevent Skin Injury  Recent Flowsheet Documentation  Taken 12/5/2023 0400 by Chelsea Babcock RN  Body Position: position changed independently  Taken 12/5/2023 0030 by Chelsea Babcock RN  Body Position: position changed independently  Taken 12/4/2023 2001 by Chelsea Babcock RN  Body Position: position changed independently  Skin Protection: adhesive use limited  Intervention: Prevent and Manage VTE (Venous Thromboembolism) Risk  Recent Flowsheet Documentation  Taken 12/4/2023 2001 by Chelsea Babcock RN  Activity Management: up ad sina  Intervention: Prevent Infection  Recent Flowsheet Documentation  Taken 12/5/2023 0600 by Chelsea Babcock RN  Infection  Prevention:   hand hygiene promoted   rest/sleep promoted   personal protective equipment utilized  Taken 12/5/2023 0400 by Chelsea Babcock RN  Infection Prevention:   hand hygiene promoted   personal protective equipment utilized   rest/sleep promoted  Taken 12/5/2023 0200 by Chelsea Babcock RN  Infection Prevention:   hand hygiene promoted   personal protective equipment utilized   rest/sleep promoted  Taken 12/5/2023 0030 by Chelsea Babcock RN  Infection Prevention:   hand hygiene promoted   personal protective equipment utilized   rest/sleep promoted  Taken 12/4/2023 2200 by Chelsea Babcock RN  Infection Prevention:   hand hygiene promoted   personal protective equipment utilized   rest/sleep promoted  Taken 12/4/2023 2001 by Chelsea Babcock RN  Infection Prevention:   hand hygiene promoted   rest/sleep promoted   personal protective equipment utilized  Goal: Optimal Comfort and Wellbeing  Outcome: Ongoing, Progressing  Intervention: Monitor Pain and Promote Comfort  Recent Flowsheet Documentation  Taken 12/5/2023 0601 by Chelsea Babcock RN  Pain Management Interventions: see MAR  Taken 12/5/2023 0030 by Chelsea Babcock RN  Pain Management Interventions: see MAR  Taken 12/4/2023 2239 by Chelsea Babcock RN  Pain Management Interventions: see MAR  Taken 12/4/2023 2001 by Chelsea Babcock RN  Pain Management Interventions: see MAR  Intervention: Provide Person-Centered Care  Recent Flowsheet Documentation  Taken 12/4/2023 2001 by Chelsea Babcock RN  Trust Relationship/Rapport:   care explained   questions answered   thoughts/feelings acknowledged  Goal: Readiness for Transition of Care  Outcome: Ongoing, Progressing     Problem: Pain Acute  Goal: Acceptable Pain Control and Functional Ability  Outcome: Ongoing, Progressing  Intervention: Prevent or Manage Pain  Recent Flowsheet Documentation  Taken 12/4/2023 2001 by Chelsea Babcock RN  Medication Review/Management: medications reviewed  Intervention: Develop Pain Management Plan  Recent  Flowsheet Documentation  Taken 12/5/2023 0601 by Chelsea Babcock RN  Pain Management Interventions: see MAR  Taken 12/5/2023 0030 by Chelsea Babcock RN  Pain Management Interventions: see MAR  Taken 12/4/2023 2239 by Chelsea Babcock RN  Pain Management Interventions: see MAR  Taken 12/4/2023 2001 by Chelsea Babcock RN  Pain Management Interventions: see MAR  Intervention: Optimize Psychosocial Wellbeing  Recent Flowsheet Documentation  Taken 12/4/2023 2001 by Chelsea Babcock RN  Supportive Measures: active listening utilized  Diversional Activities: television     Problem: Fall Injury Risk  Goal: Absence of Fall and Fall-Related Injury  Outcome: Ongoing, Progressing  Intervention: Identify and Manage Contributors  Recent Flowsheet Documentation  Taken 12/4/2023 2001 by Chelsea Babcock RN  Medication Review/Management: medications reviewed  Intervention: Promote Injury-Free Environment  Recent Flowsheet Documentation  Taken 12/5/2023 0600 by Chelsea Babcock RN  Safety Promotion/Fall Prevention:   safety round/check completed   room organization consistent  Taken 12/5/2023 0400 by Chelsea Babcock RN  Safety Promotion/Fall Prevention:   safety round/check completed   room organization consistent  Taken 12/5/2023 0200 by Chelsea Babcock RN  Safety Promotion/Fall Prevention:   safety round/check completed   room organization consistent  Taken 12/5/2023 0030 by Chelsea Babcock RN  Safety Promotion/Fall Prevention:   safety round/check completed   room organization consistent  Taken 12/4/2023 2200 by Chelsea Babcock RN  Safety Promotion/Fall Prevention:   safety round/check completed   room organization consistent   clutter free environment maintained  Taken 12/4/2023 2001 by Chelsea Babcock RN  Safety Promotion/Fall Prevention:   safety round/check completed   room organization consistent   clutter free environment maintained     Problem: Nausea and Vomiting  Goal: Fluid and Electrolyte Balance  Outcome: Ongoing, Progressing

## 2023-12-05 NOTE — PROGRESS NOTES
LOS: 4 days   Patient Care Team:  Provider, No Known as PCP - General      Subjective     Subjective: Patient reports continued generalized abdominal pain that is about the same as yesterday.  No bowel movement but passing some gas.  Tolerated small amount of liquid.  No vomiting.      ROS:   Review of Systems   Constitutional:  Negative for chills and fever.   Respiratory:  Negative for cough.    Cardiovascular:  Negative for chest pain.   Gastrointestinal:  Positive for abdominal pain and nausea. Negative for blood in stool and vomiting.   Neurological:  Negative for dizziness and weakness.   Psychiatric/Behavioral:  Negative for agitation and confusion.         Medication Review:   Scheduled Meds:enoxaparin, 40 mg, Subcutaneous, Q24H  oxyCODONE, 10 mg, Oral, Q6H  pantoprazole, 40 mg, Intravenous, Q AM  senna-docusate sodium, 2 tablet, Oral, BID   And  polyethylene glycol, 17 g, Oral, Daily  sodium chloride, 10 mL, Intravenous, Q12H      Continuous Infusions:lactated ringers, 75 mL/hr, Last Rate: 75 mL/hr (12/05/23 0806)      PRN Meds:.  senna-docusate sodium **AND** polyethylene glycol **AND** bisacodyl **AND** bisacodyl    HYDROmorphone    ondansetron    prochlorperazine    sodium chloride    sodium chloride    sodium chloride      Objective     Vital Signs  Temp:  [98.3 °F (36.8 °C)-99.2 °F (37.3 °C)] 98.5 °F (36.9 °C)  Heart Rate:  [82-88] 88  Resp:  [14-20] 16  BP: (121-137)/(69-85) 128/72    Physical Exam:    General Appearance:    Awake and alert, in no acute distress   Head:    Normocephalic, without obvious abnormality   Eyes:          Conjunctivae normal, anicteric sclera   Ears:    Hearing intact   Throat:   No oral lesions, no thrush, oral mucosa moist   Neck:   No adenopathy, supple, no JVD   Lungs:     Respirations regular, even and unlabored       Abdomen:     Soft, upper abdominal tenderness no rebound or guarding, non-distended, no hepatosplenomegaly   Rectal:     Deferred   Extremities:   No  edema, no cyanosis, no redness   Skin:   No bleeding, bruising or rash, no jaundice   Neurologic:   Sensation intact        Results Review:    CBC  Results from last 7 days   Lab Units 12/05/23  0535 12/04/23  0505 12/03/23  1029 12/02/23  0436 12/01/23  0403 11/30/23  2304   RBC 10*6/mm3 4.99 4.43 4.53 4.70 4.73 5.90*   WBC 10*3/mm3 6.40 8.40 6.10 9.20 10.30 14.30*   HEMOGLOBIN g/dL 14.0 12.4* 12.5* 13.0 13.3 16.6   PLATELETS 10*3/mm3 373 285 322 397 437 609*       CMP  Results from last 7 days   Lab Units 12/05/23  0535 12/04/23  0846 12/03/23  0434 12/02/23  0436 12/01/23  0403 11/30/23  2304   SODIUM mmol/L 134* 137 138 137 140 142   POTASSIUM mmol/L 4.1 3.9 3.9 4.2 4.4 4.3   CHLORIDE mmol/L 94* 100 100 101 104 101   CO2 mmol/L 27.0 25.0 27.0 27.0 25.0 27.0   BUN mg/dL 3* 3* 5* 12 23* 24*   CREATININE mg/dL 0.87 0.67* 0.75* 1.06 1.68* 1.85*   GLUCOSE mg/dL 89 98 91 99 133* 134*   ALBUMIN g/dL 3.2* 2.7* 3.0* 3.1* 3.7 4.6   BILIRUBIN mg/dL 0.8 0.6 0.5 0.3 0.5 0.8   ALK PHOS U/L 99 83 88 92 104 139*   AST (SGOT) U/L 13 8 7 14 12 14   ALT (SGPT) U/L 14 11 10 19 22 31       Amylase and Lipase  Results from last 7 days   Lab Units 12/05/23  0535 12/04/23  0846 12/01/23  0403 11/30/23  2304   LIPASE U/L 748* 760* 1,975* 2,138*       CRP     Results from last 7 days   Lab Units 12/05/23  0535 12/04/23  0846 12/03/23  1821   CRP mg/dL 24.66* 17.87* 18.84*       Imaging Results (Last 24 Hours)       ** No results found for the last 24 hours. **              Assessment & Plan   Patient is a 43-year-old male with a history of alcohol abuse who returns to the hospital after being discharged on 11/24/2023.  Patient returns with abdominal pain.  Continues to smoke half pack of cigarettes a day.  Reports sober since 11/3/2023.  Hepatitis panel was negative last admission. Right upper quadrant ultrasound showed no signs of cholelithiasis or acute cholecystitis.  CBD dilation 10 mm.  Triglycerides were normal. CT shows  development of fluid collection in the pancreas.     Acute pancreatitis -likely alcohol induced  Abnormal CT showing pancreatitis with pseudocysts  Elevated LFTs RESOLVED  Alcohol abuse  Abnormal drug screen -positive cocaine and opiates  Mild normocytic anemia     PLAN:  Patient with no major changes overnight.  His abdominal pain persists.  CRP 24.66.  Lipase 748.  Liver enzymes normal.  WBC normal.  Hemoglobin 14.  Repeat CT was performed yesterday and showed continued pancreatitis with increased size of pancreatic pseudocyst along with other smaller fluid collections.  Development of some ascites as well.  Discussed with patient this morning at the bedside that we recommended proceeding with a EGD and NJ tube placement.  He has since refused to have this done today.  Still recommend this be performed if he is agreeable.  Otherwise continue PPI and supportive care.  N.p.o. follow labs and needs complete cessation of alcohol and tobacco.      Noemí Espinoza, APRN  12/05/23  13:16 EST

## 2023-12-06 ENCOUNTER — INPATIENT HOSPITAL (OUTPATIENT)
Dept: URBAN - METROPOLITAN AREA HOSPITAL 84 | Facility: HOSPITAL | Age: 43
End: 2023-12-06

## 2023-12-06 DIAGNOSIS — K85.90 ACUTE PANCREATITIS WITHOUT NECROSIS OR INFECTION, UNSPECIFIE: ICD-10-CM

## 2023-12-06 LAB
ALBUMIN SERPL-MCNC: 3.1 G/DL (ref 3.5–5.2)
ALBUMIN/GLOB SERPL: 1.1 G/DL
ALP SERPL-CCNC: 85 U/L (ref 39–117)
ALT SERPL W P-5'-P-CCNC: 13 U/L (ref 1–41)
AMYLASE SERPL-CCNC: 558 U/L (ref 28–100)
ANION GAP SERPL CALCULATED.3IONS-SCNC: 11 MMOL/L (ref 5–15)
AST SERPL-CCNC: 13 U/L (ref 1–40)
BASOPHILS # BLD AUTO: 0 10*3/MM3 (ref 0–0.2)
BASOPHILS NFR BLD AUTO: 0.9 % (ref 0–1.5)
BILIRUB SERPL-MCNC: 0.4 MG/DL (ref 0–1.2)
BUN SERPL-MCNC: 5 MG/DL (ref 6–20)
BUN/CREAT SERPL: 5.7 (ref 7–25)
CALCIUM SPEC-SCNC: 8.7 MG/DL (ref 8.6–10.5)
CHLORIDE SERPL-SCNC: 98 MMOL/L (ref 98–107)
CO2 SERPL-SCNC: 28 MMOL/L (ref 22–29)
CREAT SERPL-MCNC: 0.87 MG/DL (ref 0.76–1.27)
CRP SERPL-MCNC: 23.04 MG/DL (ref 0–0.5)
DEPRECATED RDW RBC AUTO: 38.1 FL (ref 37–54)
EGFRCR SERPLBLD CKD-EPI 2021: 110.5 ML/MIN/1.73
EOSINOPHIL # BLD AUTO: 0.2 10*3/MM3 (ref 0–0.4)
EOSINOPHIL NFR BLD AUTO: 4.8 % (ref 0.3–6.2)
ERYTHROCYTE [DISTWIDTH] IN BLOOD BY AUTOMATED COUNT: 12.6 % (ref 12.3–15.4)
GLOBULIN UR ELPH-MCNC: 2.8 GM/DL
GLUCOSE SERPL-MCNC: 83 MG/DL (ref 65–99)
HCT VFR BLD AUTO: 35.7 % (ref 37.5–51)
HGB BLD-MCNC: 11.7 G/DL (ref 13–17.7)
INR PPP: 1.15 (ref 0.93–1.1)
LIPASE SERPL-CCNC: 1099 U/L (ref 13–60)
LYMPHOCYTES # BLD AUTO: 1.1 10*3/MM3 (ref 0.7–3.1)
LYMPHOCYTES NFR BLD AUTO: 26.4 % (ref 19.6–45.3)
MAGNESIUM SERPL-MCNC: 1.9 MG/DL (ref 1.6–2.6)
MCH RBC QN AUTO: 27.2 PG (ref 26.6–33)
MCHC RBC AUTO-ENTMCNC: 32.6 G/DL (ref 31.5–35.7)
MCV RBC AUTO: 83.3 FL (ref 79–97)
MONOCYTES # BLD AUTO: 0.4 10*3/MM3 (ref 0.1–0.9)
MONOCYTES NFR BLD AUTO: 8.8 % (ref 5–12)
NEUTROPHILS NFR BLD AUTO: 2.4 10*3/MM3 (ref 1.7–7)
NEUTROPHILS NFR BLD AUTO: 59.1 % (ref 42.7–76)
NRBC BLD AUTO-RTO: 0.1 /100 WBC (ref 0–0.2)
PHOSPHATE SERPL-MCNC: 3.7 MG/DL (ref 2.5–4.5)
PLATELET # BLD AUTO: 292 10*3/MM3 (ref 140–450)
PMV BLD AUTO: 8.7 FL (ref 6–12)
POTASSIUM SERPL-SCNC: 3.7 MMOL/L (ref 3.5–5.2)
PROT SERPL-MCNC: 5.9 G/DL (ref 6–8.5)
PROTHROMBIN TIME: 12.4 SECONDS (ref 9.6–11.7)
RBC # BLD AUTO: 4.29 10*6/MM3 (ref 4.14–5.8)
SODIUM SERPL-SCNC: 137 MMOL/L (ref 136–145)
WBC NRBC COR # BLD AUTO: 4.1 10*3/MM3 (ref 3.4–10.8)

## 2023-12-06 PROCEDURE — 25810000003 LACTATED RINGERS PER 1000 ML: Performed by: INTERNAL MEDICINE

## 2023-12-06 PROCEDURE — 85025 COMPLETE CBC W/AUTO DIFF WBC: CPT | Performed by: STUDENT IN AN ORGANIZED HEALTH CARE EDUCATION/TRAINING PROGRAM

## 2023-12-06 PROCEDURE — 82150 ASSAY OF AMYLASE: CPT | Performed by: NURSE PRACTITIONER

## 2023-12-06 PROCEDURE — 99232 SBSQ HOSP IP/OBS MODERATE 35: CPT | Performed by: NURSE PRACTITIONER

## 2023-12-06 PROCEDURE — 84100 ASSAY OF PHOSPHORUS: CPT | Performed by: INTERNAL MEDICINE

## 2023-12-06 PROCEDURE — 25010000002 HYDROMORPHONE 1 MG/ML SOLUTION: Performed by: STUDENT IN AN ORGANIZED HEALTH CARE EDUCATION/TRAINING PROGRAM

## 2023-12-06 PROCEDURE — 25010000002 ENOXAPARIN PER 10 MG: Performed by: HOSPITALIST

## 2023-12-06 PROCEDURE — 80053 COMPREHEN METABOLIC PANEL: CPT | Performed by: STUDENT IN AN ORGANIZED HEALTH CARE EDUCATION/TRAINING PROGRAM

## 2023-12-06 PROCEDURE — 85610 PROTHROMBIN TIME: CPT | Performed by: NURSE PRACTITIONER

## 2023-12-06 PROCEDURE — 83690 ASSAY OF LIPASE: CPT | Performed by: NURSE PRACTITIONER

## 2023-12-06 PROCEDURE — 83735 ASSAY OF MAGNESIUM: CPT | Performed by: INTERNAL MEDICINE

## 2023-12-06 PROCEDURE — 86140 C-REACTIVE PROTEIN: CPT | Performed by: NURSE PRACTITIONER

## 2023-12-06 RX ADMIN — Medication 10 ML: at 09:49

## 2023-12-06 RX ADMIN — HYDROMORPHONE HYDROCHLORIDE 1 MG: 1 INJECTION, SOLUTION INTRAMUSCULAR; INTRAVENOUS; SUBCUTANEOUS at 09:49

## 2023-12-06 RX ADMIN — PANTOPRAZOLE SODIUM 40 MG: 40 INJECTION, POWDER, FOR SOLUTION INTRAVENOUS at 05:00

## 2023-12-06 RX ADMIN — DOCUSATE SODIUM 50 MG AND SENNOSIDES 8.6 MG 2 TABLET: 8.6; 5 TABLET, FILM COATED ORAL at 20:54

## 2023-12-06 RX ADMIN — OXYCODONE 10 MG: 5 TABLET ORAL at 08:18

## 2023-12-06 RX ADMIN — HYDROMORPHONE HYDROCHLORIDE 1 MG: 1 INJECTION, SOLUTION INTRAMUSCULAR; INTRAVENOUS; SUBCUTANEOUS at 00:30

## 2023-12-06 RX ADMIN — OXYCODONE 10 MG: 5 TABLET ORAL at 13:55

## 2023-12-06 RX ADMIN — Medication 10 ML: at 19:51

## 2023-12-06 RX ADMIN — HYDROMORPHONE HYDROCHLORIDE 1 MG: 1 INJECTION, SOLUTION INTRAMUSCULAR; INTRAVENOUS; SUBCUTANEOUS at 23:56

## 2023-12-06 RX ADMIN — OXYCODONE 10 MG: 5 TABLET ORAL at 20:54

## 2023-12-06 RX ADMIN — ENOXAPARIN SODIUM 40 MG: 100 INJECTION SUBCUTANEOUS at 17:34

## 2023-12-06 RX ADMIN — SODIUM CHLORIDE, POTASSIUM CHLORIDE, SODIUM LACTATE AND CALCIUM CHLORIDE 75 ML/HR: 600; 310; 30; 20 INJECTION, SOLUTION INTRAVENOUS at 04:57

## 2023-12-06 RX ADMIN — HYDROMORPHONE HYDROCHLORIDE 1 MG: 1 INJECTION, SOLUTION INTRAMUSCULAR; INTRAVENOUS; SUBCUTANEOUS at 04:57

## 2023-12-06 RX ADMIN — OXYCODONE 10 MG: 5 TABLET ORAL at 02:40

## 2023-12-06 RX ADMIN — HYDROMORPHONE HYDROCHLORIDE 1 MG: 1 INJECTION, SOLUTION INTRAMUSCULAR; INTRAVENOUS; SUBCUTANEOUS at 14:27

## 2023-12-06 RX ADMIN — HYDROMORPHONE HYDROCHLORIDE 1 MG: 1 INJECTION, SOLUTION INTRAMUSCULAR; INTRAVENOUS; SUBCUTANEOUS at 19:50

## 2023-12-06 NOTE — CASE MANAGEMENT/SOCIAL WORK
Continued Stay Note  [facility]     Patient Name: Dayron Manley  MRN: [id number]  Today's Date: 12/6/2023    Admit Date: 11/30/2023    Plan: Return home alone. Meds to Bed   Discharge Plan       Row Name 12/06/23 1516       Plan    Plan Comments Barriers: EGD with NJT recommended but he refused. Increase in Lipase and size of pancreatic pseudocyst. Abdominal pain.                        Phone communication or documentation only - no physical contact with patient or family.   Lorena DUGAN,RN Case Manager  [facility]  Phone: Desk- [phone] cell- [phone]

## 2023-12-06 NOTE — PROGRESS NOTES
The patient looks and feels better today. He still has pain and has been taking his pain medication every three hours. He wants to try and eat today. The patient states that the decrease in fluids made his less bloated. No light headedness or dizziness. Patient appears in NAD currently. Bowels are working well. No other changes over the course of the night    [facility] MEDICINE SERVICE  DAILY PROGRESS NOTE    NAME: Dayron Manley  : 1980  MRN: [id number]      LOS: 5 days     PROVIDER OF SERVICE: Vargas Sotelo DO    Chief Complaint: Acute pancreatitis    Subjective:     Interval History:  History taken from: patient. The patient continues to complain of ongoing pain in his abdomen. He states that the pain medication orally is not strong enough. He takes Dilaudid 1mg IV Q3hrs for the pain as well which works better. He had a bowel movement last night. No nausea or vomiting. The patient is voiding well. No diarrhea. No light headedness or dizziness. Case was discussed with the nursing staff as well. Patient is able to ambulate and follow commands. No other issues over the course of the night. CT done yesterday shows pseudocyst has increased in size.     Review of Systems:   Review of Systems   Constitutional: Negative.    HENT: Negative.     Gastrointestinal:  Positive for abdominal pain. Negative for abdominal distention and blood in stool.   Genitourinary: Negative.    Neurological: Negative.        Objective:     Vital Signs  Temp:  [98 °F (36.7 °C)-98.2 °F (36.8 °C)] 98.1 °F (36.7 °C)  Heart Rate:  [68-83] 71  Resp:  [15-17] 17  BP: (111-127)/(63-77) 113/63   Body mass index is 16.71 kg/m².    Physical Exam  Alert, oriented, pleasant appears to have ongoing pain in his abdomen.   Heart - regular rate and rhythm  Lungs - CTA no wheeze and no ronchi  Abdomen - soft, he continues to have ongoing pain with palpation. Bowel sounds are present. No rebound. He does have some guarding.   Extremities - no  edema. He does state that he feels swollen.     Scheduled Meds   enoxaparin, 40 mg, Subcutaneous, Q24H  oxyCODONE, 10 mg, Oral, Q6H  pantoprazole, 40 mg, Intravenous, Q AM  senna-docusate sodium, 2 tablet, Oral, BID   And  polyethylene glycol, 17 g, Oral, Daily  sodium chloride, 10 mL, Intravenous, Q12H       PRN Meds     senna-docusate sodium **AND** polyethylene glycol **AND** bisacodyl **AND** bisacodyl    HYDROmorphone    ondansetron    prochlorperazine    sodium chloride    sodium chloride    sodium chloride   Infusions  lactated ringers, 75 mL/hr, Last Rate: 75 mL/hr (12/06/23 0957)          Diagnostic Data    Results from last 7 days   Lab Units 12/06/23  0312   WBC 10*3/mm3 4.10   HEMOGLOBIN g/dL 11.7*   HEMATOCRIT % 35.7*   PLATELETS 10*3/mm3 292   GLUCOSE mg/dL 83   CREATININE mg/dL 0.87   BUN mg/dL 5*   SODIUM mmol/L 137   POTASSIUM mmol/L 3.7   AST (SGOT) U/L 13   ALT (SGPT) U/L 13   ALK PHOS U/L 85   BILIRUBIN mg/dL 0.4   ANION GAP mmol/L 11.0       No radiology results for the last day      I reviewed the patient's new clinical results.    Assessment/Plan:     Active and Resolved Problems  Acute pancreatitis  Pancreatic pseudocyst from above  Abdominal pain  -Significantly elevated lipase  - CT showing acute pancreatitis with fluid collection within the pancreatic neck likely representing pseudocyst.  Recommend free fluid in the pelvis related to pancreatitis and tracking inflammatory changes.  Bowels not dilated    -Repeat CT scan on 12/4/2023 showed increase in the size of the pseudocyst    - on low fat and soft GI diet   -decrease fluid hydration. Patient is voiding multiple times per day currently and urine is clear. He is tolerating liquids.   -GI consult appreciated     12/06/2023 - patient would like to try and eat food as opposed to having a tube placed. The patient feels like his pain is well controlled. He is tolerating liquid. I feel this is a reasonable option for him. We can stop the  fluids and advance diet and see how he does. If he develops any worsening symptoms after eating then he will agree to have the tube placed in his nose. Labs all look good. Bowels are working well.       SINGH from volume depletion- resolved   -Continue IV fluid hydration    UTI  -s/p  ceftriaxone    DVT prophylaxis:  Medical DVT prophylaxis orders are present.     Constipation - patient would benefit from scheduled stool softeners with his ongoing narcotic use. I will schedule the patient on senna-s two tablets P.O BID and miralax 17g P.O Qday. Discussed with patient trying to increase oral pain medications and attempt to taper the dilaudid if possible.     Code status is   Code Status and Medical Interventions:   Ordered at: 12/01/23 0051     Code Status (Patient has no pulse and is not breathing):    CPR (Attempt to Resuscitate)     Medical Interventions (Patient has pulse or is breathing):    Full Support       Plan for disposition:home pending clinical improvement currently requiring IV narcotics for pain control and pseudocyst size increasing    Time: 32 minutes    Signature: Electronically signed by Vargas Sotelo DO, 12/06/23, 17:43 EST.  [facility] Hospitalist Team

## 2023-12-06 NOTE — PROGRESS NOTES
LOS: 5 days   Patient Care Team:  Provider, No Known as PCP - General      Subjective     Subjective: Patient reports some abdominal pain but mildly improved.  Feels less distended.  Had a bowel movement 2 nights ago.  Is tolerating liquids without vomiting.      ROS:   Review of Systems   Constitutional:  Negative for chills and fever.   Respiratory:  Negative for cough and shortness of breath.    Cardiovascular:  Negative for chest pain.   Gastrointestinal:  Positive for abdominal distention and abdominal pain. Negative for blood in stool, nausea and vomiting.   Psychiatric/Behavioral:  Negative for agitation and confusion.         Medication Review:   Scheduled Meds:enoxaparin, 40 mg, Subcutaneous, Q24H  oxyCODONE, 10 mg, Oral, Q6H  pantoprazole, 40 mg, Intravenous, Q AM  senna-docusate sodium, 2 tablet, Oral, BID   And  polyethylene glycol, 17 g, Oral, Daily  sodium chloride, 10 mL, Intravenous, Q12H      Continuous Infusions:lactated ringers, 75 mL/hr, Last Rate: 75 mL/hr (12/06/23 7614)      PRN Meds:.  senna-docusate sodium **AND** polyethylene glycol **AND** bisacodyl **AND** bisacodyl    HYDROmorphone    ondansetron    prochlorperazine    sodium chloride    sodium chloride    sodium chloride      Objective     Vital Signs  Temp:  [98 °F (36.7 °C)-98.9 °F (37.2 °C)] 98.1 °F (36.7 °C)  Heart Rate:  [68-87] 71  Resp:  [15-16] 16  BP: (111-128)/(67-77) 111/69    Physical Exam:    General Appearance:    Awake and alert, in no acute distress   Head:    Normocephalic, without obvious abnormality   Eyes:          Conjunctivae normal, anicteric sclera   Ears:    Hearing intact   Throat:   No oral lesions, no thrush, oral mucosa moist   Neck:   No adenopathy, supple, no JVD   Lungs:     Respirations regular, even and unlabored       Abdomen:     Soft, upper abdominal tenderness, no rebound or guarding, mildly distended   Rectal:     Deferred   Extremities:   No edema, no cyanosis, no redness   Skin:   No bleeding,  bruising or rash, no jaundice   Neurologic:   Sensation intact        Results Review:    CBC  Results from last 7 days   Lab Units 12/06/23 0312 12/05/23  0535 12/04/23  0505 12/03/23  1029 12/02/23  0436 12/01/23  0403 11/30/23  2304   RBC 10*6/mm3 4.29 4.99 4.43 4.53 4.70 4.73 5.90*   WBC 10*3/mm3 4.10 6.40 8.40 6.10 9.20 10.30 14.30*   HEMOGLOBIN g/dL 11.7* 14.0 12.4* 12.5* 13.0 13.3 16.6   PLATELETS 10*3/mm3 292 373 285 322 397 437 609*       CMP  Results from last 7 days   Lab Units 12/06/23 0312 12/05/23  0535 12/04/23  0846 12/03/23  0434 12/02/23  0436 12/01/23  0403 11/30/23  2304   SODIUM mmol/L 137 134* 137 138 137 140 142   POTASSIUM mmol/L 3.7 4.1 3.9 3.9 4.2 4.4 4.3   CHLORIDE mmol/L 98 94* 100 100 101 104 101   CO2 mmol/L 28.0 27.0 25.0 27.0 27.0 25.0 27.0   BUN mg/dL 5* 3* 3* 5* 12 23* 24*   CREATININE mg/dL 0.87 0.87 0.67* 0.75* 1.06 1.68* 1.85*   GLUCOSE mg/dL 83 89 98 91 99 133* 134*   ALBUMIN g/dL 3.1* 3.2* 2.7* 3.0* 3.1* 3.7 4.6   BILIRUBIN mg/dL 0.4 0.8 0.6 0.5 0.3 0.5 0.8   ALK PHOS U/L 85 99 83 88 92 104 139*   AST (SGOT) U/L 13 13 8 7 14 12 14   ALT (SGPT) U/L 13 14 11 10 19 22 31       Amylase and Lipase  Results from last 7 days   Lab Units 12/06/23 0312 12/05/23  0535 12/04/23  0846 12/01/23  0403 11/30/23  2304   AMYLASE U/L 558*  --   --   --   --    LIPASE U/L 1,099* 748* 760* 1,975* 2,138*       CRP     Results from last 7 days   Lab Units 12/06/23  0312 12/05/23  0535 12/04/23  0846 12/03/23  1821   CRP mg/dL 23.04* 24.66* 17.87* 18.84*       Imaging Results (Last 24 Hours)       ** No results found for the last 24 hours. **              Assessment & Plan   Patient is a 43-year-old male with a history of alcohol abuse who returns to the hospital after being discharged on 11/24/2023.  Patient returns with abdominal pain.  Continues to smoke half pack of cigarettes a day.  Reports sober since 11/3/2023.  Hepatitis panel was negative last admission. Right upper quadrant ultrasound  showed no signs of cholelithiasis or acute cholecystitis.  CBD dilation 10 mm.  Triglycerides were normal. CT shows development of fluid collection in the pancreas.     Acute pancreatitis -likely alcohol induced  Abnormal CT showing pancreatitis with pseudocysts  Elevated LFTs RESOLVED  Alcohol abuse  Abnormal drug screen -positive cocaine and opiates  Mild normocytic anemia     PLAN:  Patient with continued upper abdominal pain but he does feel it is slightly improved.  Feels less distended today.  CRP 23.  Lipase 1099 which is increased from 748.  Liver enzymes remain normal.  WBC normal at 4.1.  Hemoglobin 11.7.  CT did show increased size of pancreatic pseudocyst.  EGD with NJ tube placement was recommended but he still declines this morning.  Recommend he continue on PPI and clear liquid diet for now.  Continue supportive care.  Advised again about complete cessation of alcohol and tobacco.    Noemí Espinoza, APRN  12/06/23  10:11 EST

## 2023-12-07 ENCOUNTER — INPATIENT HOSPITAL (OUTPATIENT)
Dept: URBAN - METROPOLITAN AREA HOSPITAL 84 | Facility: HOSPITAL | Age: 43
End: 2023-12-07

## 2023-12-07 DIAGNOSIS — R11.0 NAUSEA: ICD-10-CM

## 2023-12-07 DIAGNOSIS — K86.3 PSEUDOCYST OF PANCREAS: ICD-10-CM

## 2023-12-07 DIAGNOSIS — K85.90 ACUTE PANCREATITIS WITHOUT NECROSIS OR INFECTION, UNSPECIFIE: ICD-10-CM

## 2023-12-07 DIAGNOSIS — D64.9 ANEMIA, UNSPECIFIED: ICD-10-CM

## 2023-12-07 DIAGNOSIS — R10.84 GENERALIZED ABDOMINAL PAIN: ICD-10-CM

## 2023-12-07 DIAGNOSIS — F10.10 ALCOHOL ABUSE, UNCOMPLICATED: ICD-10-CM

## 2023-12-07 LAB
ALBUMIN SERPL-MCNC: 3.5 G/DL (ref 3.5–5.2)
ALBUMIN/GLOB SERPL: 1.1 G/DL
ALP SERPL-CCNC: 96 U/L (ref 39–117)
ALT SERPL W P-5'-P-CCNC: 20 U/L (ref 1–41)
AMYLASE SERPL-CCNC: 487 U/L (ref 28–100)
ANION GAP SERPL CALCULATED.3IONS-SCNC: 10 MMOL/L (ref 5–15)
AST SERPL-CCNC: 22 U/L (ref 1–40)
BASOPHILS # BLD AUTO: 0 10*3/MM3 (ref 0–0.2)
BASOPHILS NFR BLD AUTO: 0.9 % (ref 0–1.5)
BILIRUB SERPL-MCNC: 0.4 MG/DL (ref 0–1.2)
BUN SERPL-MCNC: 3 MG/DL (ref 6–20)
BUN/CREAT SERPL: 3.7 (ref 7–25)
CALCIUM SPEC-SCNC: 9.4 MG/DL (ref 8.6–10.5)
CHLORIDE SERPL-SCNC: 99 MMOL/L (ref 98–107)
CO2 SERPL-SCNC: 30 MMOL/L (ref 22–29)
CREAT SERPL-MCNC: 0.81 MG/DL (ref 0.76–1.27)
CRP SERPL-MCNC: 17.02 MG/DL (ref 0–0.5)
DEPRECATED RDW RBC AUTO: 39.4 FL (ref 37–54)
EGFRCR SERPLBLD CKD-EPI 2021: 112.9 ML/MIN/1.73
EOSINOPHIL # BLD AUTO: 0.1 10*3/MM3 (ref 0–0.4)
EOSINOPHIL NFR BLD AUTO: 3.6 % (ref 0.3–6.2)
ERYTHROCYTE [DISTWIDTH] IN BLOOD BY AUTOMATED COUNT: 12.9 % (ref 12.3–15.4)
GLOBULIN UR ELPH-MCNC: 3.1 GM/DL
GLUCOSE SERPL-MCNC: 104 MG/DL (ref 65–99)
HCT VFR BLD AUTO: 40.1 % (ref 37.5–51)
HGB BLD-MCNC: 13.1 G/DL (ref 13–17.7)
LIPASE SERPL-CCNC: 1136 U/L (ref 13–60)
LYMPHOCYTES # BLD AUTO: 0.9 10*3/MM3 (ref 0.7–3.1)
LYMPHOCYTES NFR BLD AUTO: 28.7 % (ref 19.6–45.3)
MCH RBC QN AUTO: 27.1 PG (ref 26.6–33)
MCHC RBC AUTO-ENTMCNC: 32.6 G/DL (ref 31.5–35.7)
MCV RBC AUTO: 83.2 FL (ref 79–97)
MONOCYTES # BLD AUTO: 0.3 10*3/MM3 (ref 0.1–0.9)
MONOCYTES NFR BLD AUTO: 8.1 % (ref 5–12)
NEUTROPHILS NFR BLD AUTO: 1.8 10*3/MM3 (ref 1.7–7)
NEUTROPHILS NFR BLD AUTO: 58.7 % (ref 42.7–76)
NRBC BLD AUTO-RTO: 0.1 /100 WBC (ref 0–0.2)
PLATELET # BLD AUTO: 391 10*3/MM3 (ref 140–450)
PMV BLD AUTO: 8.7 FL (ref 6–12)
POTASSIUM SERPL-SCNC: 3.9 MMOL/L (ref 3.5–5.2)
PROT SERPL-MCNC: 6.6 G/DL (ref 6–8.5)
RBC # BLD AUTO: 4.83 10*6/MM3 (ref 4.14–5.8)
SODIUM SERPL-SCNC: 139 MMOL/L (ref 136–145)
WBC NRBC COR # BLD AUTO: 3.1 10*3/MM3 (ref 3.4–10.8)

## 2023-12-07 PROCEDURE — 80053 COMPREHEN METABOLIC PANEL: CPT | Performed by: STUDENT IN AN ORGANIZED HEALTH CARE EDUCATION/TRAINING PROGRAM

## 2023-12-07 PROCEDURE — 25010000002 HYDROMORPHONE 1 MG/ML SOLUTION: Performed by: STUDENT IN AN ORGANIZED HEALTH CARE EDUCATION/TRAINING PROGRAM

## 2023-12-07 PROCEDURE — 86140 C-REACTIVE PROTEIN: CPT | Performed by: NURSE PRACTITIONER

## 2023-12-07 PROCEDURE — 25010000002 HYDROMORPHONE 1 MG/ML SOLUTION: Performed by: INTERNAL MEDICINE

## 2023-12-07 PROCEDURE — 83690 ASSAY OF LIPASE: CPT | Performed by: NURSE PRACTITIONER

## 2023-12-07 PROCEDURE — 82150 ASSAY OF AMYLASE: CPT | Performed by: NURSE PRACTITIONER

## 2023-12-07 PROCEDURE — 99232 SBSQ HOSP IP/OBS MODERATE 35: CPT | Performed by: NURSE PRACTITIONER

## 2023-12-07 PROCEDURE — 85025 COMPLETE CBC W/AUTO DIFF WBC: CPT | Performed by: STUDENT IN AN ORGANIZED HEALTH CARE EDUCATION/TRAINING PROGRAM

## 2023-12-07 RX ORDER — OXYCODONE HYDROCHLORIDE 5 MG/1
15 TABLET ORAL EVERY 4 HOURS PRN
Status: DISCONTINUED | OUTPATIENT
Start: 2023-12-07 | End: 2023-12-09

## 2023-12-07 RX ADMIN — OXYCODONE 10 MG: 5 TABLET ORAL at 02:43

## 2023-12-07 RX ADMIN — PANCRELIPASE 12000 UNITS OF LIPASE: 60000; 12000; 38000 CAPSULE, DELAYED RELEASE PELLETS ORAL at 15:04

## 2023-12-07 RX ADMIN — OXYCODONE HYDROCHLORIDE 15 MG: 5 TABLET ORAL at 15:04

## 2023-12-07 RX ADMIN — HYDROMORPHONE HYDROCHLORIDE 1 MG: 1 INJECTION, SOLUTION INTRAMUSCULAR; INTRAVENOUS; SUBCUTANEOUS at 04:07

## 2023-12-07 RX ADMIN — OXYCODONE 10 MG: 5 TABLET ORAL at 07:35

## 2023-12-07 RX ADMIN — POLYETHYLENE GLYCOL 3350 17 G: 17 POWDER, FOR SOLUTION ORAL at 07:35

## 2023-12-07 RX ADMIN — OXYCODONE HYDROCHLORIDE 15 MG: 5 TABLET ORAL at 23:52

## 2023-12-07 RX ADMIN — HYDROMORPHONE HYDROCHLORIDE 0.5 MG: 1 INJECTION, SOLUTION INTRAMUSCULAR; INTRAVENOUS; SUBCUTANEOUS at 17:10

## 2023-12-07 RX ADMIN — PANTOPRAZOLE SODIUM 40 MG: 40 INJECTION, POWDER, FOR SOLUTION INTRAVENOUS at 05:55

## 2023-12-07 RX ADMIN — DOCUSATE SODIUM 50 MG AND SENNOSIDES 8.6 MG 2 TABLET: 8.6; 5 TABLET, FILM COATED ORAL at 20:00

## 2023-12-07 RX ADMIN — HYDROMORPHONE HYDROCHLORIDE 0.5 MG: 1 INJECTION, SOLUTION INTRAMUSCULAR; INTRAVENOUS; SUBCUTANEOUS at 12:11

## 2023-12-07 RX ADMIN — OXYCODONE HYDROCHLORIDE 15 MG: 5 TABLET ORAL at 20:00

## 2023-12-07 RX ADMIN — HYDROMORPHONE HYDROCHLORIDE 1 MG: 1 INJECTION, SOLUTION INTRAMUSCULAR; INTRAVENOUS; SUBCUTANEOUS at 08:32

## 2023-12-07 RX ADMIN — Medication 10 ML: at 20:00

## 2023-12-07 RX ADMIN — HYDROMORPHONE HYDROCHLORIDE 0.5 MG: 1 INJECTION, SOLUTION INTRAMUSCULAR; INTRAVENOUS; SUBCUTANEOUS at 21:30

## 2023-12-07 NOTE — PROGRESS NOTES
The patient still complains of ongoing abdominal pain. Not worsened by the food which was started yesterday. The patient was able to eat some of the low fat diet yesterday. Bowels are still working well. No fevers, chills, sweats. No nausea or vomiting. No light headedness or dizziness. Patient is able to follow commands without issue. Overall doing slightly better after eating.      [facility] MEDICINE SERVICE  DAILY PROGRESS NOTE    NAME: Dayron Manley  : 1980  MRN: [id number]      LOS: 6 days     PROVIDER OF SERVICE: Vargas Sotelo DO    Chief Complaint: Acute pancreatitis    Subjective:     Review of Systems:   Review of Systems   Constitutional: Negative.    HENT: Negative.     Gastrointestinal:  Positive for abdominal pain. Negative for abdominal distention and blood in stool.   Genitourinary: Negative.    Neurological: Negative.        Objective:     Vital Signs  Temp:  [98 °F (36.7 °C)-98.6 °F (37 °C)] 98 °F (36.7 °C)  Heart Rate:  [72-84] 77  Resp:  [15-17] 17  BP: (113-141)/(63-92) 141/92   Body mass index is 16.71 kg/m².    Physical Exam  Alert, oriented, pleasant appears to have ongoing pain in his abdomen.   Heart - regular rate and rhythm  Lungs - CTA no wheeze and no ronchi  Abdomen - soft, he continues to have ongoing pain with palpation. Bowel sounds are present. No rebound. He does have some guarding.   Extremities - no edema. He does state that he feels swollen.     Scheduled Meds   enoxaparin, 40 mg, Subcutaneous, Q24H  oxyCODONE, 10 mg, Oral, Q6H  pantoprazole, 40 mg, Intravenous, Q AM  senna-docusate sodium, 2 tablet, Oral, BID   And  polyethylene glycol, 17 g, Oral, Daily  sodium chloride, 10 mL, Intravenous, Q12H       PRN Meds     senna-docusate sodium **AND** polyethylene glycol **AND** bisacodyl **AND** bisacodyl    HYDROmorphone    ondansetron    prochlorperazine    sodium chloride    sodium chloride    sodium chloride   Infusions           Diagnostic Data    Results from  last 7 days   Lab Units 12/07/23  0418   WBC 10*3/mm3 3.10*   HEMOGLOBIN g/dL 13.1   HEMATOCRIT % 40.1   PLATELETS 10*3/mm3 391   GLUCOSE mg/dL 104*   CREATININE mg/dL 0.81   BUN mg/dL 3*   SODIUM mmol/L 139   POTASSIUM mmol/L 3.9   AST (SGOT) U/L 22   ALT (SGPT) U/L 20   ALK PHOS U/L 96   BILIRUBIN mg/dL 0.4   ANION GAP mmol/L 10.0       No radiology results for the last day      I reviewed the patient's new clinical results.    Assessment/Plan:     Active and Resolved Problems  Acute pancreatitis  Pancreatic pseudocyst from above  Abdominal pain  -Significantly elevated lipase  - CT showing acute pancreatitis with fluid collection within the pancreatic neck likely representing pseudocyst.  Recommend free fluid in the pelvis related to pancreatitis and tracking inflammatory changes.  Bowels not dilated    -Repeat CT scan on 12/4/2023 showed increase in the size of the pseudocyst    - on low fat and soft GI diet   -decrease fluid hydration. Patient is voiding multiple times per day currently and urine is clear. He is tolerating liquids.   -GI consult appreciated     12/06/2023 - patient would like to try and eat food as opposed to having a tube placed. The patient feels like his pain is well controlled. He is tolerating liquid. I feel this is a reasonable option for him. We can stop the fluids and advance diet and see how he does. If he develops any worsening symptoms after eating then he will agree to have the tube placed in his nose. Labs all look good. Bowels are working well.     12/07/2023 - patient is doing slowly better. Goal to change to oral medications instead of IV. No longer taking IV fluids. Renal function is normal. Gi assistance appreciated.       SINGH from volume depletion- resolved   -Continue IV fluid hydration    UTI  -s/p  ceftriaxone    DVT prophylaxis:  Medical DVT prophylaxis orders are present.     Constipation - patient would benefit from scheduled stool softeners with his ongoing narcotic  use. I will schedule the patient on senna-s two tablets P.O BID and miralax 17g P.O Qday. Discussed with patient trying to increase oral pain medications and attempt to taper the dilaudid if possible.     Code status is   Code Status and Medical Interventions:   Ordered at: 12/01/23 0051     Code Status (Patient has no pulse and is not breathing):    CPR (Attempt to Resuscitate)     Medical Interventions (Patient has pulse or is breathing):    Full Support       Plan for disposition:home pending clinical improvement currently requiring IV narcotics for pain control and pseudocyst size increasing    Time: 32 minutes    Signature: Electronically signed by Vargas Sotelo DO, 12/07/23, 09:42 EST.  [facility] Hospitalist Team

## 2023-12-07 NOTE — CASE MANAGEMENT/SOCIAL WORK
Social Work Assessment   [facility]     Patient Name: Dayron Manley  MRN: [id number]  Today's Date: 12/7/2023    Admit Date: 11/30/2023       Discharge Plan       Row Name 12/07/23 1317       Plan    Plan Comments Sw followed up with Pt about scheduling a follow up appointment. Pt was agreeable to [facility] in [place] for his appointment. Appointment scheduled 12-13-23 with Nikko CHIRINOS. Sw added to AVS.          AGNIESZKA Ly, MSW    Phone: [phone]  Fax: [phone]  Email: [email or web address]

## 2023-12-07 NOTE — PLAN OF CARE
Problem: Adult Inpatient Plan of Care  Goal: Plan of Care Review  Outcome: Ongoing, Progressing  Flowsheets (Taken 12/6/2023 0339)  Plan of Care Reviewed With: patient  Goal: Patient-Specific Goal (Individualized)  Outcome: Ongoing, Progressing  Goal: Absence of Hospital-Acquired Illness or Injury  Outcome: Ongoing, Progressing  Intervention: Identify and Manage Fall Risk  Recent Flowsheet Documentation  Taken 12/7/2023 0044 by Ana Puri, RN  Safety Promotion/Fall Prevention:   safety round/check completed   room organization consistent   nonskid shoes/slippers when out of bed   lighting adjusted   clutter free environment maintained   assistive device/personal items within reach   activity supervised  Taken 12/6/2023 2220 by Ana Puri, RN  Safety Promotion/Fall Prevention:   room organization consistent   safety round/check completed   nonskid shoes/slippers when out of bed   lighting adjusted   clutter free environment maintained   assistive device/personal items within reach   activity supervised  Taken 12/6/2023 1950 by Ana Puri RN  Safety Promotion/Fall Prevention:   safety round/check completed   room organization consistent   nonskid shoes/slippers when out of bed   lighting adjusted   clutter free environment maintained   assistive device/personal items within reach   activity supervised  Intervention: Prevent Skin Injury  Recent Flowsheet Documentation  Taken 12/7/2023 0044 by Ana Puri RN  Body Position: position changed independently  Taken 12/6/2023 1950 by Ana Puri RN  Body Position: position changed independently  Intervention: Prevent and Manage VTE (Venous Thromboembolism) Risk  Recent Flowsheet Documentation  Taken 12/7/2023 0044 by Aan Puri, RN  Activity Management: up ad sina  Taken 12/6/2023 1950 by Ana Puri RN  Activity Management: up ad sina  Intervention: Prevent Infection  Recent Flowsheet Documentation  Taken  12/7/2023 0044 by Ana Puri RN  Infection Prevention:   single patient room provided   rest/sleep promoted   personal protective equipment utilized   hand hygiene promoted  Taken 12/6/2023 2220 by Ana Puri RN  Infection Prevention:   single patient room provided   rest/sleep promoted   hand hygiene promoted   personal protective equipment utilized  Taken 12/6/2023 1950 by Ana Puri RN  Infection Prevention:   single patient room provided   rest/sleep promoted   personal protective equipment utilized   hand hygiene promoted  Goal: Optimal Comfort and Wellbeing  Outcome: Ongoing, Progressing  Intervention: Provide Person-Centered Care  Recent Flowsheet Documentation  Taken 12/6/2023 1950 by Ana Puri RN  Trust Relationship/Rapport:   care explained   choices provided   questions answered   questions encouraged   reassurance provided   thoughts/feelings acknowledged  Goal: Readiness for Transition of Care  Outcome: Ongoing, Progressing     Problem: Pain Acute  Goal: Acceptable Pain Control and Functional Ability  Outcome: Ongoing, Progressing  Intervention: Prevent or Manage Pain  Recent Flowsheet Documentation  Taken 12/7/2023 0044 by Ana Puri RN  Medication Review/Management: medications reviewed  Taken 12/6/2023 2220 by Ana Puri RN  Medication Review/Management: medications reviewed  Taken 12/6/2023 1950 by Ana Puri RN  Sleep/Rest Enhancement: awakenings minimized  Medication Review/Management: medications reviewed  Intervention: Optimize Psychosocial Wellbeing  Recent Flowsheet Documentation  Taken 12/6/2023 1950 by Ana Puri RN  Supportive Measures: active listening utilized  Diversional Activities:   television   smartphone     Problem: Fall Injury Risk  Goal: Absence of Fall and Fall-Related Injury  Outcome: Ongoing, Progressing  Intervention: Identify and Manage Contributors  Recent Flowsheet Documentation  Taken  12/7/2023 0044 by Ana Puri, RN  Medication Review/Management: medications reviewed  Taken 12/6/2023 2220 by Ana Puri, RN  Medication Review/Management: medications reviewed  Taken 12/6/2023 1950 by Ana Puri, RN  Medication Review/Management: medications reviewed  Self-Care Promotion:   BADL personal routines maintained   BADL personal objects within reach   independence encouraged  Intervention: Promote Injury-Free Environment  Recent Flowsheet Documentation  Taken 12/7/2023 0044 by Ana Puri, RN  Safety Promotion/Fall Prevention:   safety round/check completed   room organization consistent   nonskid shoes/slippers when out of bed   lighting adjusted   clutter free environment maintained   assistive device/personal items within reach   activity supervised  Taken 12/6/2023 2220 by Ana Puri, RN  Safety Promotion/Fall Prevention:   room organization consistent   safety round/check completed   nonskid shoes/slippers when out of bed   lighting adjusted   clutter free environment maintained   assistive device/personal items within reach   activity supervised  Taken 12/6/2023 1950 by Ana Puri, RN  Safety Promotion/Fall Prevention:   safety round/check completed   room organization consistent   nonskid shoes/slippers when out of bed   lighting adjusted   clutter free environment maintained   assistive device/personal items within reach   activity supervised     Problem: Nausea and Vomiting  Goal: Fluid and Electrolyte Balance  Outcome: Ongoing, Progressing   Goal Outcome Evaluation:  Plan of Care Reviewed With: patient        Progress: no change

## 2023-12-07 NOTE — CASE MANAGEMENT/SOCIAL WORK
Social Work Assessment  [facility]     Patient Name: Dayron Manley  MRN: [id number]  Today's Date: 12/7/2023    Admit Date: 11/30/2023       Discharge Plan       Row Name 12/07/23 1138       Plan    Plan Comments Sw attemtped to meet with Pt at bedside. Pt's mother was present. Pt was in the shower. Sw will follow back up.      AGNIESZKA Ly, MSW    Phone: [phone]  Fax: [phone]  Email: [email or web address]

## 2023-12-07 NOTE — PROGRESS NOTES
LOS: 6 days   Patient Care Team:  Provider, No Known as PCP - General      Subjective     Subjective: Patient tried some low-fat solid food last night and complains of increased abdominal pain.  He denies any vomiting.      ROS:   Review of Systems   Constitutional:  Negative for chills and fever.   Respiratory:  Negative for cough and shortness of breath.    Gastrointestinal:  Positive for abdominal pain. Negative for vomiting.   Neurological:  Negative for dizziness.   Psychiatric/Behavioral:  Negative for agitation and confusion.         Medication Review:   Scheduled Meds:enoxaparin, 40 mg, Subcutaneous, Q24H  senna-docusate sodium, 2 tablet, Oral, BID   And  polyethylene glycol, 17 g, Oral, Daily  sodium chloride, 10 mL, Intravenous, Q12H      Continuous Infusions:   PRN Meds:.  senna-docusate sodium **AND** polyethylene glycol **AND** bisacodyl **AND** bisacodyl    HYDROmorphone    ondansetron    oxyCODONE    prochlorperazine    sodium chloride    sodium chloride    sodium chloride      Objective     Vital Signs  Temp:  [98 °F (36.7 °C)-98.6 °F (37 °C)] 98 °F (36.7 °C)  Heart Rate:  [72-84] 77  Resp:  [16-17] 17  BP: (113-141)/(63-92) 141/92    Physical Exam:    General Appearance:    Awake and alert, in no acute distress   Head:    Normocephalic, without obvious abnormality   Eyes:          Conjunctivae normal, anicteric sclera   Ears:    Hearing intact   Throat:   No oral lesions, no thrush, oral mucosa moist       Lungs:     Respirations regular, even and unlabored       Abdomen:     Soft, upper abdominal tenderness, no rebound or guarding, non-distended   Rectal:     Deferred   Extremities:   No edema, no cyanosis, no redness   Skin:   No bleeding, bruising or rash, no jaundice   Neurologic:   Sensation intact        Results Review:    CBC  Results from last 7 days   Lab Units 12/07/23  0418 12/06/23  0312 12/05/23  0535 12/04/23  0505 12/03/23  1029 12/02/23  0436 12/01/23  0403   RBC 10*6/mm3 4.83 4.29  4.99 4.43 4.53 4.70 4.73   WBC 10*3/mm3 3.10* 4.10 6.40 8.40 6.10 9.20 10.30   HEMOGLOBIN g/dL 13.1 11.7* 14.0 12.4* 12.5* 13.0 13.3   PLATELETS 10*3/mm3 391 292 373 285 322 397 437       CMP  Results from last 7 days   Lab Units 12/07/23  0418 12/06/23  0312 12/05/23  0535 12/04/23  0846 12/03/23  0434 12/02/23  0436 12/01/23  0403   SODIUM mmol/L 139 137 134* 137 138 137 140   POTASSIUM mmol/L 3.9 3.7 4.1 3.9 3.9 4.2 4.4   CHLORIDE mmol/L 99 98 94* 100 100 101 104   CO2 mmol/L 30.0* 28.0 27.0 25.0 27.0 27.0 25.0   BUN mg/dL 3* 5* 3* 3* 5* 12 23*   CREATININE mg/dL 0.81 0.87 0.87 0.67* 0.75* 1.06 1.68*   GLUCOSE mg/dL 104* 83 89 98 91 99 133*   ALBUMIN g/dL 3.5 3.1* 3.2* 2.7* 3.0* 3.1* 3.7   BILIRUBIN mg/dL 0.4 0.4 0.8 0.6 0.5 0.3 0.5   ALK PHOS U/L 96 85 99 83 88 92 104   AST (SGOT) U/L 22 13 13 8 7 14 12   ALT (SGPT) U/L 20 13 14 11 10 19 22       Amylase and Lipase  Results from last 7 days   Lab Units 12/07/23 0418 12/06/23 0312 12/05/23  0535 12/04/23  0846 12/01/23  0403 11/30/23  2304   AMYLASE U/L 487* 558*  --   --   --   --    LIPASE U/L 1,136* 1,099* 748* 760* 1,975* 2,138*       CRP     Results from last 7 days   Lab Units 12/07/23  0418 12/06/23  0312 12/05/23  0535 12/04/23  0846 12/03/23  1821   CRP mg/dL 17.02* 23.04* 24.66* 17.87* 18.84*       Imaging Results (Last 24 Hours)       ** No results found for the last 24 hours. **              Assessment & Plan   Patient is a 43-year-old male with a history of alcohol abuse who returns to the hospital after being discharged on 11/24/2023.  Patient returns with abdominal pain.  Continues to smoke half pack of cigarettes a day.  Reports sober since 11/3/2023.  Hepatitis panel was negative last admission. Right upper quadrant ultrasound showed no signs of cholelithiasis or acute cholecystitis.  CBD dilation 10 mm.  Triglycerides were normal. CT shows development of fluid collection in the pancreas.     Acute pancreatitis -likely alcohol induced  Abnormal  CT showing pancreatitis with pseudocysts  Elevated LFTs RESOLVED  Alcohol abuse  Abnormal drug screen -positive cocaine and opiates  Mild normocytic anemia     PLAN:  Patient tried some low-fat food last night and reports increased abdominal pain.  No vomiting.  CRP today is 17.  Lipase did increase slightly to 1136.  Amylase 47.  His CT has shown increased size of the pancreatic pseudocyst.  He had declined having EGD with NJ tube placement.  He understands his course will likely be prolonged as the pancreas could possibly heal quicker with NJ tube and tube feedings.  He will be continued on PPI and IV fluids.  Add Creon.  Supportive care and decrease back to clear liquids only. Needs complete cessation of alcohol and tobacco.    Noemí Espinoza, APRN  12/07/23  11:16 EST

## 2023-12-07 NOTE — DISCHARGE INSTR - OTHER ORDERS
Follow up Primary Care appointment: December 18th, 2023 at 2:30. Please bring a photo ID and insurance card.   Address: [address] [place] [address].    [phone].

## 2023-12-08 ENCOUNTER — ANESTHESIA (OUTPATIENT)
Dept: GASTROENTEROLOGY | Facility: HOSPITAL | Age: 43
End: 2023-12-08
Payer: COMMERCIAL

## 2023-12-08 ENCOUNTER — INPATIENT HOSPITAL (OUTPATIENT)
Dept: URBAN - METROPOLITAN AREA HOSPITAL 84 | Facility: HOSPITAL | Age: 43
End: 2023-12-08
Payer: COMMERCIAL

## 2023-12-08 ENCOUNTER — ANESTHESIA EVENT (OUTPATIENT)
Dept: GASTROENTEROLOGY | Facility: HOSPITAL | Age: 43
End: 2023-12-08
Payer: COMMERCIAL

## 2023-12-08 ENCOUNTER — APPOINTMENT (OUTPATIENT)
Dept: GENERAL RADIOLOGY | Facility: HOSPITAL | Age: 43
End: 2023-12-08
Payer: COMMERCIAL

## 2023-12-08 DIAGNOSIS — K29.70 GASTRITIS, UNSPECIFIED, WITHOUT BLEEDING: ICD-10-CM

## 2023-12-08 DIAGNOSIS — K85.20 ALCOHOL INDUCED ACUTE PANCREATITIS WITHOUT NECROSIS OR INFEC: ICD-10-CM

## 2023-12-08 DIAGNOSIS — Z97.8 PRESENCE OF OTHER SPECIFIED DEVICES: ICD-10-CM

## 2023-12-08 LAB
ALBUMIN SERPL-MCNC: 3.1 G/DL (ref 3.5–5.2)
ALBUMIN/GLOB SERPL: 1 G/DL
ALP SERPL-CCNC: 89 U/L (ref 39–117)
ALT SERPL W P-5'-P-CCNC: 18 U/L (ref 1–41)
ANION GAP SERPL CALCULATED.3IONS-SCNC: 14 MMOL/L (ref 5–15)
AST SERPL-CCNC: 16 U/L (ref 1–40)
BASOPHILS # BLD AUTO: 0 10*3/MM3 (ref 0–0.2)
BASOPHILS NFR BLD AUTO: 0.7 % (ref 0–1.5)
BILIRUB SERPL-MCNC: 0.3 MG/DL (ref 0–1.2)
BUN SERPL-MCNC: 2 MG/DL (ref 6–20)
BUN/CREAT SERPL: 2.9 (ref 7–25)
CALCIUM SPEC-SCNC: 9 MG/DL (ref 8.6–10.5)
CHLORIDE SERPL-SCNC: 99 MMOL/L (ref 98–107)
CO2 SERPL-SCNC: 24 MMOL/L (ref 22–29)
CREAT SERPL-MCNC: 0.69 MG/DL (ref 0.76–1.27)
CRP SERPL-MCNC: 11.69 MG/DL (ref 0–0.5)
DEPRECATED RDW RBC AUTO: 38.9 FL (ref 37–54)
EGFRCR SERPLBLD CKD-EPI 2021: 118.5 ML/MIN/1.73
EOSINOPHIL # BLD AUTO: 0.2 10*3/MM3 (ref 0–0.4)
EOSINOPHIL NFR BLD AUTO: 5.3 % (ref 0.3–6.2)
ERYTHROCYTE [DISTWIDTH] IN BLOOD BY AUTOMATED COUNT: 13 % (ref 12.3–15.4)
GLOBULIN UR ELPH-MCNC: 3.2 GM/DL
GLUCOSE BLDC GLUCOMTR-MCNC: 140 MG/DL (ref 70–105)
GLUCOSE BLDC GLUCOMTR-MCNC: 66 MG/DL (ref 70–105)
GLUCOSE SERPL-MCNC: 101 MG/DL (ref 65–99)
HCT VFR BLD AUTO: 37.4 % (ref 37.5–51)
HGB BLD-MCNC: 12.4 G/DL (ref 13–17.7)
LIPASE SERPL-CCNC: 1088 U/L (ref 13–60)
LYMPHOCYTES # BLD AUTO: 1 10*3/MM3 (ref 0.7–3.1)
LYMPHOCYTES NFR BLD AUTO: 29.5 % (ref 19.6–45.3)
MCH RBC QN AUTO: 27.9 PG (ref 26.6–33)
MCHC RBC AUTO-ENTMCNC: 33.1 G/DL (ref 31.5–35.7)
MCV RBC AUTO: 84.3 FL (ref 79–97)
MONOCYTES # BLD AUTO: 0.3 10*3/MM3 (ref 0.1–0.9)
MONOCYTES NFR BLD AUTO: 9.1 % (ref 5–12)
NEUTROPHILS NFR BLD AUTO: 2 10*3/MM3 (ref 1.7–7)
NEUTROPHILS NFR BLD AUTO: 55.4 % (ref 42.7–76)
NRBC BLD AUTO-RTO: 0.1 /100 WBC (ref 0–0.2)
PLATELET # BLD AUTO: 403 10*3/MM3 (ref 140–450)
PMV BLD AUTO: 8.3 FL (ref 6–12)
POTASSIUM SERPL-SCNC: 4 MMOL/L (ref 3.5–5.2)
PROT SERPL-MCNC: 6.3 G/DL (ref 6–8.5)
RBC # BLD AUTO: 4.44 10*6/MM3 (ref 4.14–5.8)
SODIUM SERPL-SCNC: 137 MMOL/L (ref 136–145)
WBC NRBC COR # BLD AUTO: 3.5 10*3/MM3 (ref 3.4–10.8)

## 2023-12-08 PROCEDURE — 86140 C-REACTIVE PROTEIN: CPT | Performed by: NURSE PRACTITIONER

## 2023-12-08 PROCEDURE — 80053 COMPREHEN METABOLIC PANEL: CPT | Performed by: STUDENT IN AN ORGANIZED HEALTH CARE EDUCATION/TRAINING PROGRAM

## 2023-12-08 PROCEDURE — 82948 REAGENT STRIP/BLOOD GLUCOSE: CPT

## 2023-12-08 PROCEDURE — 25010000002 HYDROMORPHONE 1 MG/ML SOLUTION: Performed by: INTERNAL MEDICINE

## 2023-12-08 PROCEDURE — 83690 ASSAY OF LIPASE: CPT | Performed by: NURSE PRACTITIONER

## 2023-12-08 PROCEDURE — 43241 EGD TUBE/CATH INSERTION: CPT | Performed by: INTERNAL MEDICINE

## 2023-12-08 PROCEDURE — 85025 COMPLETE CBC W/AUTO DIFF WBC: CPT | Performed by: STUDENT IN AN ORGANIZED HEALTH CARE EDUCATION/TRAINING PROGRAM

## 2023-12-08 PROCEDURE — 25010000002 FENTANYL CITRATE (PF) 100 MCG/2ML SOLUTION: Performed by: NURSE ANESTHETIST, CERTIFIED REGISTERED

## 2023-12-08 PROCEDURE — 76000 FLUOROSCOPY <1 HR PHYS/QHP: CPT

## 2023-12-08 PROCEDURE — 25010000002 PROPOFOL 200 MG/20ML EMULSION: Performed by: NURSE ANESTHETIST, CERTIFIED REGISTERED

## 2023-12-08 PROCEDURE — 25010000002 ENOXAPARIN PER 10 MG: Performed by: HOSPITALIST

## 2023-12-08 PROCEDURE — 0DHA8UZ INSERTION OF FEEDING DEVICE INTO JEJUNUM, VIA NATURAL OR ARTIFICIAL OPENING ENDOSCOPIC: ICD-10-PCS | Performed by: INTERNAL MEDICINE

## 2023-12-08 RX ORDER — IBUPROFEN 600 MG/1
1 TABLET ORAL
Status: DISCONTINUED | OUTPATIENT
Start: 2023-12-08 | End: 2023-12-19 | Stop reason: HOSPADM

## 2023-12-08 RX ORDER — NICOTINE POLACRILEX 4 MG
15 LOZENGE BUCCAL
Status: DISCONTINUED | OUTPATIENT
Start: 2023-12-08 | End: 2023-12-19 | Stop reason: HOSPADM

## 2023-12-08 RX ORDER — LIDOCAINE HYDROCHLORIDE 10 MG/ML
INJECTION, SOLUTION EPIDURAL; INFILTRATION; INTRACAUDAL; PERINEURAL AS NEEDED
Status: DISCONTINUED | OUTPATIENT
Start: 2023-12-08 | End: 2023-12-08 | Stop reason: SURG

## 2023-12-08 RX ORDER — DEXTROSE MONOHYDRATE 25 G/50ML
25 INJECTION, SOLUTION INTRAVENOUS
Status: DISCONTINUED | OUTPATIENT
Start: 2023-12-08 | End: 2023-12-19 | Stop reason: HOSPADM

## 2023-12-08 RX ORDER — PROPOFOL 10 MG/ML
INJECTION, EMULSION INTRAVENOUS AS NEEDED
Status: DISCONTINUED | OUTPATIENT
Start: 2023-12-08 | End: 2023-12-08 | Stop reason: SURG

## 2023-12-08 RX ORDER — FENTANYL CITRATE 50 UG/ML
INJECTION, SOLUTION INTRAMUSCULAR; INTRAVENOUS AS NEEDED
Status: DISCONTINUED | OUTPATIENT
Start: 2023-12-08 | End: 2023-12-08 | Stop reason: SURG

## 2023-12-08 RX ORDER — DEXTROSE AND SODIUM CHLORIDE 5; .45 G/100ML; G/100ML
100 INJECTION, SOLUTION INTRAVENOUS CONTINUOUS
Status: DISCONTINUED | OUTPATIENT
Start: 2023-12-08 | End: 2023-12-19 | Stop reason: HOSPADM

## 2023-12-08 RX ADMIN — DOCUSATE SODIUM 50 MG AND SENNOSIDES 8.6 MG 2 TABLET: 8.6; 5 TABLET, FILM COATED ORAL at 08:24

## 2023-12-08 RX ADMIN — OXYCODONE HYDROCHLORIDE 15 MG: 5 TABLET ORAL at 03:49

## 2023-12-08 RX ADMIN — OXYCODONE HYDROCHLORIDE 15 MG: 5 TABLET ORAL at 12:27

## 2023-12-08 RX ADMIN — Medication 10 ML: at 08:25

## 2023-12-08 RX ADMIN — HYDROMORPHONE HYDROCHLORIDE 0.5 MG: 1 INJECTION, SOLUTION INTRAMUSCULAR; INTRAVENOUS; SUBCUTANEOUS at 22:51

## 2023-12-08 RX ADMIN — POLYETHYLENE GLYCOL 3350 17 G: 17 POWDER, FOR SOLUTION ORAL at 08:24

## 2023-12-08 RX ADMIN — HYDROMORPHONE HYDROCHLORIDE 0.5 MG: 1 INJECTION, SOLUTION INTRAMUSCULAR; INTRAVENOUS; SUBCUTANEOUS at 10:04

## 2023-12-08 RX ADMIN — DEXTROSE MONOHYDRATE 25 G: 25 INJECTION, SOLUTION INTRAVENOUS at 18:50

## 2023-12-08 RX ADMIN — OXYCODONE HYDROCHLORIDE 15 MG: 5 TABLET ORAL at 20:32

## 2023-12-08 RX ADMIN — FENTANYL CITRATE 25 MCG: 50 INJECTION, SOLUTION INTRAMUSCULAR; INTRAVENOUS at 15:15

## 2023-12-08 RX ADMIN — PANCRELIPASE 12000 UNITS OF LIPASE: 60000; 12000; 38000 CAPSULE, DELAYED RELEASE PELLETS ORAL at 17:57

## 2023-12-08 RX ADMIN — DEXTROSE AND SODIUM CHLORIDE 150 ML/HR: 5; 450 INJECTION, SOLUTION INTRAVENOUS at 18:47

## 2023-12-08 RX ADMIN — SODIUM CHLORIDE 100 ML/HR: 9 INJECTION, SOLUTION INTRAVENOUS at 15:11

## 2023-12-08 RX ADMIN — HYDROMORPHONE HYDROCHLORIDE 0.5 MG: 1 INJECTION, SOLUTION INTRAMUSCULAR; INTRAVENOUS; SUBCUTANEOUS at 01:39

## 2023-12-08 RX ADMIN — PROPOFOL 200 MG: 10 INJECTION, EMULSION INTRAVENOUS at 15:20

## 2023-12-08 RX ADMIN — HYDROMORPHONE HYDROCHLORIDE 0.5 MG: 1 INJECTION, SOLUTION INTRAMUSCULAR; INTRAVENOUS; SUBCUTANEOUS at 05:44

## 2023-12-08 RX ADMIN — FENTANYL CITRATE 25 MCG: 50 INJECTION, SOLUTION INTRAMUSCULAR; INTRAVENOUS at 15:38

## 2023-12-08 RX ADMIN — PROPOFOL 50 MG: 10 INJECTION, EMULSION INTRAVENOUS at 15:38

## 2023-12-08 RX ADMIN — PANCRELIPASE 12000 UNITS OF LIPASE: 60000; 12000; 38000 CAPSULE, DELAYED RELEASE PELLETS ORAL at 08:24

## 2023-12-08 RX ADMIN — HYDROMORPHONE HYDROCHLORIDE 0.5 MG: 1 INJECTION, SOLUTION INTRAMUSCULAR; INTRAVENOUS; SUBCUTANEOUS at 17:57

## 2023-12-08 RX ADMIN — OXYCODONE HYDROCHLORIDE 15 MG: 5 TABLET ORAL at 08:24

## 2023-12-08 RX ADMIN — PROPOFOL 50 MG: 10 INJECTION, EMULSION INTRAVENOUS at 15:26

## 2023-12-08 RX ADMIN — LIDOCAINE HYDROCHLORIDE 50 MG: 10 INJECTION, SOLUTION EPIDURAL; INFILTRATION; INTRACAUDAL; PERINEURAL at 15:20

## 2023-12-08 RX ADMIN — ENOXAPARIN SODIUM 40 MG: 100 INJECTION SUBCUTANEOUS at 17:57

## 2023-12-08 NOTE — ANESTHESIA PREPROCEDURE EVALUATION
Anesthesia Evaluation     Patient summary reviewed and Nursing notes reviewed   NPO Solid Status: > 8 hours  NPO Liquid Status: > 8 hours           Airway   Mallampati: II  TM distance: >3 FB  Neck ROM: full  No difficulty expected  Dental - normal exam   (+) poor dentition    Pulmonary - normal exam   Cardiovascular - normal exam        Neuro/Psych  GI/Hepatic/Renal/Endo      Musculoskeletal     Abdominal  - normal exam    Bowel sounds: normal.   Substance History      OB/GYN          Other        ROS/Med Hx Other: Very poor dentition, advanced decay throughout  Quit smoking 3 wk ago              Anesthesia Plan    ASA 2     MAC and general     intravenous induction     Anesthetic plan, risks, benefits, and alternatives have been provided, discussed and informed consent has been obtained with: patient.    Plan discussed with CRNA and CAA.    CODE STATUS:    Code Status (Patient has no pulse and is not breathing): CPR (Attempt to Resuscitate)  Medical Interventions (Patient has pulse or is breathing): Full Support

## 2023-12-08 NOTE — CONSULTS
Nutrition Services    Patient Name: Dayron Manley  YOB: 1980  MRN: [id number]  Admission date: 11/30/2023    Comment:    Initiate Isosource 1.5 at 30 mL/hr + 10 mL q2 water flush via NJ when able.    PPE Documentation        PPE Worn By Provider Did not enter room this encounter   PPE Worn By Patient  N/A     CLINICAL NUTRITION ASSESSMENT      Reason for Assessment 12/8: TF consult  12/2: BMI < 19     H&P      History reviewed. No pertinent past medical history.    History reviewed. No pertinent surgical history.     Current Problems   SINGH - resolved    UTI    Constipation    Acute pancreatitis  -GI following  -NJ with TF to start 12/8       Encounter Information        Trending Narrative     12/8: Pt being reassessed for follow up. Noted pt with increasing pain after diet was advanced to low fat. Pt now NPO with plan for EGD with NJ placement + TF initiation. Pt in ENDO, unable to complete NFPE. Noted plan to wean pt's narcotics and also increase stool softeners once procedure is done today.    12/2: Pt being assessed for BMI < 19. Pt admitted to [facility] with acute pancreatitis. Pt reported he quit excessively drinking about 10 years ago and only drinks on rare occasions at this time. CT AP showed acute pancreatitis with new fluid collection seen within the pancreatic neck and body likely representing pseudocyst. RD visited pt at bedside. Pt reported he generally eats whatever he wants and a good quantity of food,. When he starts to have abdominal pain suggestive of an acute pancreatitis flare up he will fast for a couple of days which causes symptoms to subside. Once symptoms have resolved he returns to eating whatever he wants. Pt does drink an ONS at home although he is unsure what the brand is. Pt also unsure of calorie/protein content. Pt agreeable to Boost Breeze while on clear liquid diet. RD provided pt with Boost Breeze Peach at time of visit. Pt reports UBW is 170#. RD noted that bed scale  "wt at time of visit was 141# but currently recorded wt is 123#. Pt reported he had just gotten back to working out 7 days a week prior to pancreatitis flare up. NFPE completed and not consistent with nutrition diagnosis of malnutrition at this time using AND/ASPEN criteria       Anthropometrics        Current Height, Weight Height: 182.9 cm (72.01\")  Weight: 55.9 kg (123 lb 3.8 oz) (12/01/23 0134)       Usual Body Weight (UBW) 170#       Trending Weight Hx     This admission: 12/8: no new wt, RD ordered wt 12/8 12/2: 123#             PTA: 16% wt loss x 1 month, question accuracy. Net fluid gain of >3L since admission per I/O documentation.     Wt Readings from Last 30 Encounters:   12/01/23 0134 55.9 kg (123 lb 3.8 oz)   11/30/23 2246 61.2 kg (134 lb 14.7 oz)   11/29/23 1148 68 kg (150 lb)   11/23/23 0543 68.9 kg (151 lb 14.4 oz)   11/22/23 0500 68.5 kg (151 lb 0.2 oz)   11/21/23 2311 68.5 kg (151 lb 0.2 oz)   11/21/23 0542 66.6 kg (146 lb 13.2 oz)   11/20/23 0500 64.5 kg (142 lb 3.2 oz)   11/19/23 0822 68 kg (150 lb)      BMI kg/m2 Body mass index is 16.71 kg/m².       Labs        Pertinent Labs Amylase: 487  Lipase: 1088   Results from last 7 days   Lab Units 12/08/23  0351 12/07/23  0418 12/06/23  0312   SODIUM mmol/L 137 139 137   POTASSIUM mmol/L 4.0 3.9 3.7   CHLORIDE mmol/L 99 99 98   CO2 mmol/L 24.0 30.0* 28.0   BUN mg/dL 2* 3* 5*   CREATININE mg/dL 0.69* 0.81 0.87   CALCIUM mg/dL 9.0 9.4 8.7   BILIRUBIN mg/dL 0.3 0.4 0.4   ALK PHOS U/L 89 96 85   ALT (SGPT) U/L 18 20 13   AST (SGOT) U/L 16 22 13   GLUCOSE mg/dL 101* 104* 83     Results from last 7 days   Lab Units 12/08/23  0351 12/07/23  0418 12/06/23  0312 12/03/23  1029 12/02/23  0436   MAGNESIUM mg/dL  --   --  1.9  --  1.9   PHOSPHORUS mg/dL  --   --  3.7  --   --    HEMOGLOBIN g/dL 12.4*   < > 11.7*   < > 13.0   HEMATOCRIT % 37.4*   < > 35.7*   < > 39.7    < > = values in this interval not displayed.     Lab Results   Component Value Date    HGBA1C " 5.10 11/30/2023        Medications    Scheduled Medications enoxaparin, 40 mg, Subcutaneous, Q24H  pancrelipase (Lip-Prot-Amyl), 12,000 units of lipase, Oral, TID With Meals  senna-docusate sodium, 2 tablet, Oral, BID   And  polyethylene glycol, 17 g, Oral, Daily  sodium chloride, 10 mL, Intravenous, Q12H        Infusions      PRN Medications   senna-docusate sodium **AND** polyethylene glycol **AND** bisacodyl **AND** bisacodyl    HYDROmorphone    ondansetron    oxyCODONE    prochlorperazine    sodium chloride    sodium chloride    sodium chloride     Physical Findings        Trending Physical   Appearance, NFPE 12/8: KAYLYN    12/2: NFPE completed and not consistent with nutrition diagnosis of malnutrition at this time using AND/ASPEN criteria      --  Edema  None documented      Bowel Function + BM on 12/5     Tubes None, plan for NJ placement      Chewing/Swallowing No difficulty      Skin Intact        Estimated/Assessed Needs    Estimated 12/8   Energy Requirements    EST Needs, Method, Wt used 1448-7616 kcal/day (25-30 kcal/kg IBW, 80.9 kg)       Protein Requirements    EST Needs, Method, Wt used 97 g/day (1.2 g/kg IBW)       Fluid Requirements     Estimated Needs (mL/day) 1 mL/kcal or per MD     Fluid Deficit    Current Na Level (mEq/L)    Desired Na Level (mEq/L)    Estimated Fluid Deficit (L)       Current Nutrition Orders & Evaluation of Intake       Oral Nutrition     Food Allergies NKFA   Current PO Diet NPO Diet NPO Type: Strict NPO   Supplement -   PO Evaluation     Trending % PO Intake 12/8: NPO now, minimal nutrition with nature of liquid diets and frequent NPO     Enteral Nutrition    Enteral Route Plan for NJ placement   Order, Modulars, Flushes    Residual/Tolerance    TF Observation         Parenteral Nutrition     TPN Route    Total # Days on TPN    TPN Order, Lipid Details    MVI & Trace Element Freq    TPN Observation         Nutrition Course Details    PO Diets: Back and forth progression and  degression of diet: NPO, clear liquids, full liquids, and low fat diet   Nutrition Support: Plan for TF initiation 12/8     Nutritional Risk Screening        NRS-2002 Score          Nutrition Diagnosis         Nutrition Dx Problem 1 Inadequate energy intake related to clinical course as evidenced by frequent NPO status and liquid diets this admission.       Nutrition Dx Problem 2        Intervention Goal         Intervention Goal(s) TF initiation and tolerance  PO intake > 75%  Diet tolerance     Nutrition Intervention        RD Action Provide TF recommendations      Nutrition Prescription          Diet Prescription NPO   Supplement Prescription -     Enteral Prescription Initial Goal:  *initial goal conservative d/t risk of RFS    Isosource 1.5 at 30 mL/hr + 10 mL q2 water flush      End Goal:    Isosource 1.5 at 70 mL/hr + water flush per clinical course     Calories  2310 kcals (within range)    Protein  105 g (108%)    Free water  1170 mL    Flushes       The above end goal rate is for 22 hrs/day to assume interruptions for ADLs. Water flushes adjusted based on clinical picture + Rx flushes/IV fluids          TPN Prescription      Monitor/Evaluation        Monitor Per protocol, PO intake, Pertinent labs, EN delivery/tolerance, Weight, GI status       Electronically signed by:  Asya Zavala RD  12/08/23 10:15 EST

## 2023-12-08 NOTE — PROGRESS NOTES
The patient still complains of ongoing abdominal pain. The patient states that the change in pain medication did not help. He is still requesting dilaudid for pain. No nausea or vomiting. The patient has not had a bowel movement in the last two days. No fevers, chills, sweats. Patient is able to follow commands without issue. The patients mother is present in the room today and given an update on the patients condition. He appears in NAD currently. Gi has rounded already today. Patient has agreed to placement of a tube now. No other issues overnight. The patient looks and feels better today. He still has pain and has been taking his pain medication every three hours. He wants to try and eat today. The patient states that the decrease in fluids made his less bloated. No light headedness or dizziness. Patient appears in NAD currently. Bowels are working well. No other changes over the course of the nightThe patient still complains of ongoing abdominal pain. Not worsened by the food which was started yesterday. The patient was able to eat some of the low fat diet yesterday. Bowels are still working well. No fevers, chills, sweats. No nausea or vomiting. No light headedness or dizziness. Patient is able to follow commands without issue. Overall doing slightly better after eating.      [facility] MEDICINE SERVICE  DAILY PROGRESS NOTE    NAME: Dayron Manley  : 1980  MRN: [id number]      LOS: 7 days     PROVIDER OF SERVICE: Vargas Sotelo DO    Chief Complaint: Acute pancreatitis    Subjective:     Review of Systems:   Review of Systems   Constitutional: Negative.    HENT: Negative.     Gastrointestinal:  Positive for abdominal pain. Negative for abdominal distention and blood in stool.   Genitourinary: Negative.    Neurological: Negative.        Objective:     Vital Signs  Temp:  [98.5 °F (36.9 °C)-98.8 °F (37.1 °C)] 98.8 °F (37.1 °C)  Heart Rate:  [62-88] 62  Resp:  [15-21] 15  BP: (109-140)/(77-98)  109/80   Body mass index is 17.34 kg/m².    Physical Exam  Alert, oriented, pleasant appears to have ongoing pain in his abdomen.   Heart - regular rate and rhythm  Lungs - CTA no wheeze and no ronchi  Abdomen - soft, he continues to have ongoing pain with palpation. Bowel sounds are present. No rebound. He does have some guarding.   Extremities - no edema. He does state that he feels swollen.     Scheduled Meds   enoxaparin, 40 mg, Subcutaneous, Q24H  pancrelipase (Lip-Prot-Amyl), 12,000 units of lipase, Oral, TID With Meals  senna-docusate sodium, 2 tablet, Oral, BID   And  polyethylene glycol, 17 g, Oral, Daily  sodium chloride, 10 mL, Intravenous, Q12H       PRN Meds     senna-docusate sodium **AND** polyethylene glycol **AND** bisacodyl **AND** bisacodyl    HYDROmorphone    ondansetron    oxyCODONE    prochlorperazine    sodium chloride    sodium chloride    sodium chloride   Infusions           Diagnostic Data    Results from last 7 days   Lab Units 12/08/23  0351   WBC 10*3/mm3 3.50   HEMOGLOBIN g/dL 12.4*   HEMATOCRIT % 37.4*   PLATELETS 10*3/mm3 403   GLUCOSE mg/dL 101*   CREATININE mg/dL 0.69*   BUN mg/dL 2*   SODIUM mmol/L 137   POTASSIUM mmol/L 4.0   AST (SGOT) U/L 16   ALT (SGPT) U/L 18   ALK PHOS U/L 89   BILIRUBIN mg/dL 0.3   ANION GAP mmol/L 14.0       No radiology results for the last day      I reviewed the patient's new clinical results.    Assessment/Plan:     Active and Resolved Problems  Acute pancreatitis  Pancreatic pseudocyst from above  Abdominal pain  -Significantly elevated lipase  - CT showing acute pancreatitis with fluid collection within the pancreatic neck likely representing pseudocyst.  Recommend free fluid in the pelvis related to pancreatitis and tracking inflammatory changes.  Bowels not dilated    -Repeat CT scan on 12/4/2023 showed increase in the size of the pseudocyst    - on low fat and soft GI diet   -decrease fluid hydration. Patient is voiding multiple times per day  currently and urine is clear. He is tolerating liquids.   -GI consult appreciated     12/06/2023 - patient would like to try and eat food as opposed to having a tube placed. The patient feels like his pain is well controlled. He is tolerating liquid. I feel this is a reasonable option for him. We can stop the fluids and advance diet and see how he does. If he develops any worsening symptoms after eating then he will agree to have the tube placed in his nose. Labs all look good. Bowels are working well.     12/07/2023 - patient is doing slowly better. Goal to change to oral medications instead of IV. No longer taking IV fluids. Renal function is normal. Gi assistance appreciated.     12/08/2023 - long discussion with patient and mother. The patient will need to start weaning his narcotics. He has taken narcotics for years and I am concerned about him becoming addicted to the medication. The patient will be given a higher dose bowel regiment as well today. The patient is tolerating a liquid diet and has received large amounts of IV fluid hydration since coming into the hospital. Labs are checked daily. Taper narcotics. Increase stool softeners once procedure is done. Clear liquid diet.       SINGH from volume depletion- resolved   -Continue IV fluid hydration    UTI  -s/p  ceftriaxone    DVT prophylaxis:  Medical DVT prophylaxis orders are present.     Constipation - patient would benefit from scheduled stool softeners with his ongoing narcotic use. I will schedule the patient on senna-s two tablets P.O BID and miralax 17g P.O Qday. Discussed with patient trying to increase oral pain medications and attempt to taper the dilaudid if possible.     Code status is   Code Status and Medical Interventions:   Ordered at: 12/01/23 0051     Code Status (Patient has no pulse and is not breathing):    CPR (Attempt to Resuscitate)     Medical Interventions (Patient has pulse or is breathing):    Full Support       Plan for  disposition:home pending clinical improvement currently requiring IV narcotics for pain control and pseudocyst size increasing    Time: 32 minutes    Signature: Electronically signed by Vargas Sotelo DO, 12/08/23, 12:16 EST.  [facility] Hospitalist Team   NAME:

## 2023-12-08 NOTE — CASE MANAGEMENT/SOCIAL WORK
Continued Stay Note  [facility]     Patient Name: Dayron Manley  MRN: [id number]  Today's Date: 12/8/2023    Admit Date: 11/30/2023    Plan: Return home alone. Meds to Bed   Discharge Plan       Row Name 12/08/23 0949       Plan    Plan Return home alone. Meds to Bed    Plan Comments Barriers: GI following and Mr. Manley has increase abdomimal pain, increase Lipase;  inability to tolerate diet. He  has agreed today for ESOPHAGOGASTRODUODENOSCOPY with NJ tube placement                  Phone communication or documentation only - no physical contact with patient or family.     Lorena DUGAN,RN Case Manager  [facility]  Phone: Desk- [phone] cell- [phone]

## 2023-12-08 NOTE — ANESTHESIA POSTPROCEDURE EVALUATION
Patient: Dayron Manley    Procedure Summary       Date: 12/08/23 Room / Location: [facility] ENDOSCOPY 4 / [facility] ENDOSCOPY    Anesthesia Start: 1511 Anesthesia Stop: 1603    Procedure: ESOPHAGOGASTRODUODENOSCOPY with NJ tube placement Diagnosis:       Alcohol-induced acute pancreatitis, unspecified complication status      (Alcohol-induced acute pancreatitis, unspecified complication status [K85.20])    Surgeons: Luan Mendoza MD Provider: Sergio Quintana MD    Anesthesia Type: MAC, general ASA Status: 2            Anesthesia Type: MAC, general    Vitals  Vitals Value Taken Time   /80 12/08/23 1617   Temp     Pulse 79 12/08/23 1619   Resp 12 12/08/23 1612   SpO2 99 % 12/08/23 1619   Vitals shown include unfiled device data.        Post Anesthesia Care and Evaluation    Patient location during evaluation: PHASE II  Patient participation: complete - patient participated  Level of consciousness: awake  Pain score: 1  Pain management: satisfactory to patient  Anesthetic complications: No anesthetic complications  PONV Status: none  Cardiovascular status: acceptable  Respiratory status: acceptable  Hydration status: acceptable

## 2023-12-08 NOTE — PLAN OF CARE
Goal Outcome Evaluation:  Plan of Care Reviewed With: patient        Progress: no change  Outcome Evaluation: pt continues to have abdomen pain

## 2023-12-08 NOTE — OP NOTE
ESOPHAGOGASTRODUODENOSCOPY Procedure Report    Patient Name:  Dayron Manley  YOB: 1980    Date of Surgery:  2023     Pre-Op Diagnosis:  Alcohol-induced acute pancreatitis, unspecified complication status [K85.20]         Procedure/CPT® Codes:      Procedure(s):  ESOPHAGOGASTRODUODENOSCOPY with NJ tube placement    Staff:  Surgeon(s):  Luan Mendoza MD      Anesthesia: Monitored Anesthesia Care    Description of Procedure:  A description of the procedure as well as risks, benefits and alternative methods were explained to the patient who voiced understanding and signed the corresponding consent form. A physical exam was performed and vital signs were monitored throughout the procedure.    Bridle was placed and secured and subsequently  scope was advanced from orifice, esophagus stomach and the second part of duodenum subsequently proximal to mid part of the duodenum under fluoroscopy.  Wire was passed through the scope and watch under the fluoroscopy was.  Scope was slowly withdrawn over the wire.  Subsequently NJ tube was placed on the left nares and secured with the bridle.  Patient Toller procedure very well immediate complication was noticed.  Significant retained fluid was seen in the stomach which was suctioned.    Impression:  Successful NJ tube placement under fluoroscopy  Retained fluid was noticed in the stomach which was suctioned minimal changes of gastritis    Recommendations:  Patient will be started on tube feeding with the NJ tube.  He will be kept NPO.  Continue with IV fluid.  Follow clinical response.  May consider repeating imaging study on Tuesday or Wednesday  Discussed with the patient mother in detail    Luan Mendoza MD     Date: 2023    Time: 15:49 EST

## 2023-12-08 NOTE — PLAN OF CARE
Problem: Adult Inpatient Plan of Care  Goal: Plan of Care Review  Outcome: Ongoing, Progressing  Flowsheets (Taken 12/8/2023 1613)  Progress: improving  Plan of Care Reviewed With: patient  Outcome Evaluation: Patient had EGD with NJ tube placement, nutrition consult for tube feeds, care continues  Goal: Patient-Specific Goal (Individualized)  Outcome: Ongoing, Progressing  Goal: Absence of Hospital-Acquired Illness or Injury  Outcome: Ongoing, Progressing  Intervention: Identify and Manage Fall Risk  Recent Flowsheet Documentation  Taken 12/8/2023 1400 by Kasey Diaz RN  Safety Promotion/Fall Prevention: patient off unit  Taken 12/8/2023 1309 by Kasey Diaz RN  Safety Promotion/Fall Prevention: patient off unit  Taken 12/8/2023 1200 by Kasey Diaz RN  Safety Promotion/Fall Prevention: safety round/check completed  Taken 12/8/2023 1004 by Kasey Diaz RN  Safety Promotion/Fall Prevention: safety round/check completed  Taken 12/8/2023 0800 by Kasey Diaz RN  Safety Promotion/Fall Prevention: safety round/check completed  Intervention: Prevent Skin Injury  Recent Flowsheet Documentation  Taken 12/8/2023 0800 by Kasey Diaz RN  Body Position: position changed independently  Intervention: Prevent and Manage VTE (Venous Thromboembolism) Risk  Recent Flowsheet Documentation  Taken 12/8/2023 0800 by Kasey Diaz RN  Activity Management: ambulated in room  Intervention: Prevent Infection  Recent Flowsheet Documentation  Taken 12/8/2023 0800 by Kasey Diaz RN  Infection Prevention: single patient room provided  Goal: Optimal Comfort and Wellbeing  Outcome: Ongoing, Progressing  Intervention: Monitor Pain and Promote Comfort  Recent Flowsheet Documentation  Taken 12/8/2023 1227 by Kasey Diaz RN  Pain Management Interventions: see MAR  Taken 12/8/2023 1200 by Kasey Diaz RN  Pain Management Interventions: see MAR  Taken  12/8/2023 1004 by Kasey Diaz RN  Pain Management Interventions: see MAR  Taken 12/8/2023 0824 by Kasey Diaz RN  Pain Management Interventions: see MAR  Goal: Readiness for Transition of Care  Outcome: Ongoing, Progressing     Problem: Pain Acute  Goal: Acceptable Pain Control and Functional Ability  Outcome: Ongoing, Progressing  Intervention: Prevent or Manage Pain  Recent Flowsheet Documentation  Taken 12/8/2023 0800 by Kasey Diaz RN  Medication Review/Management: medications reviewed  Intervention: Develop Pain Management Plan  Recent Flowsheet Documentation  Taken 12/8/2023 1227 by Kasey Diaz RN  Pain Management Interventions: see MAR  Taken 12/8/2023 1200 by Kasey Diaz RN  Pain Management Interventions: see MAR  Taken 12/8/2023 1004 by Kasey Diaz RN  Pain Management Interventions: see MAR  Taken 12/8/2023 0824 by Kasey Diaz RN  Pain Management Interventions: see MAR  Intervention: Optimize Psychosocial Wellbeing  Recent Flowsheet Documentation  Taken 12/8/2023 0800 by Kasey Diaz RN  Supportive Measures: active listening utilized  Diversional Activities: television     Problem: Fall Injury Risk  Goal: Absence of Fall and Fall-Related Injury  Outcome: Ongoing, Progressing  Intervention: Identify and Manage Contributors  Recent Flowsheet Documentation  Taken 12/8/2023 0800 by Kasey Diaz RN  Medication Review/Management: medications reviewed  Intervention: Promote Injury-Free Environment  Recent Flowsheet Documentation  Taken 12/8/2023 1400 by Kasey Diaz RN  Safety Promotion/Fall Prevention: patient off unit  Taken 12/8/2023 1309 by Kasey Diaz RN  Safety Promotion/Fall Prevention: patient off unit  Taken 12/8/2023 1200 by Kasey Diaz RN  Safety Promotion/Fall Prevention: safety round/check completed  Taken 12/8/2023 1004 by Kasey Diaz RN  Safety Promotion/Fall Prevention:  safety round/check completed  Taken 12/8/2023 0800 by Kasey Diaz, RN  Safety Promotion/Fall Prevention: safety round/check completed     Problem: Nausea and Vomiting  Goal: Fluid and Electrolyte Balance  Outcome: Ongoing, Progressing  Intervention: Prevent and Manage Nausea and Vomiting  Recent Flowsheet Documentation  Taken 12/8/2023 0800 by Kasey Diaz, RN  Environmental Support: calm environment promoted   Goal Outcome Evaluation:  Plan of Care Reviewed With: patient        Progress: improving  Outcome Evaluation: Patient had EGD with NJ tube placement, nutrition consult for tube feeds, care continues

## 2023-12-09 ENCOUNTER — INPATIENT HOSPITAL (OUTPATIENT)
Dept: URBAN - METROPOLITAN AREA HOSPITAL 84 | Facility: HOSPITAL | Age: 43
End: 2023-12-09

## 2023-12-09 DIAGNOSIS — F10.10 ALCOHOL ABUSE, UNCOMPLICATED: ICD-10-CM

## 2023-12-09 DIAGNOSIS — R10.816 EPIGASTRIC ABDOMINAL TENDERNESS: ICD-10-CM

## 2023-12-09 DIAGNOSIS — Z97.8 PRESENCE OF OTHER SPECIFIED DEVICES: ICD-10-CM

## 2023-12-09 DIAGNOSIS — D64.9 ANEMIA, UNSPECIFIED: ICD-10-CM

## 2023-12-09 DIAGNOSIS — K85.90 ACUTE PANCREATITIS WITHOUT NECROSIS OR INFECTION, UNSPECIFIE: ICD-10-CM

## 2023-12-09 DIAGNOSIS — K86.3 PSEUDOCYST OF PANCREAS: ICD-10-CM

## 2023-12-09 LAB
ALBUMIN SERPL-MCNC: 3.3 G/DL (ref 3.5–5.2)
ALBUMIN/GLOB SERPL: 1.2 G/DL
ALP SERPL-CCNC: 100 U/L (ref 39–117)
ALT SERPL W P-5'-P-CCNC: 14 U/L (ref 1–41)
ANION GAP SERPL CALCULATED.3IONS-SCNC: 10 MMOL/L (ref 5–15)
AST SERPL-CCNC: 15 U/L (ref 1–40)
BASOPHILS # BLD AUTO: 0 10*3/MM3 (ref 0–0.2)
BASOPHILS NFR BLD AUTO: 0.5 % (ref 0–1.5)
BILIRUB SERPL-MCNC: 0.3 MG/DL (ref 0–1.2)
BUN SERPL-MCNC: 2 MG/DL (ref 6–20)
BUN/CREAT SERPL: 2.6 (ref 7–25)
CALCIUM SPEC-SCNC: 8.5 MG/DL (ref 8.6–10.5)
CHLORIDE SERPL-SCNC: 102 MMOL/L (ref 98–107)
CO2 SERPL-SCNC: 26 MMOL/L (ref 22–29)
CREAT SERPL-MCNC: 0.77 MG/DL (ref 0.76–1.27)
CRP SERPL-MCNC: 7.94 MG/DL (ref 0–0.5)
DEPRECATED RDW RBC AUTO: 39.4 FL (ref 37–54)
EGFRCR SERPLBLD CKD-EPI 2021: 114.6 ML/MIN/1.73
EOSINOPHIL # BLD AUTO: 0.2 10*3/MM3 (ref 0–0.4)
EOSINOPHIL NFR BLD AUTO: 3.8 % (ref 0.3–6.2)
ERYTHROCYTE [DISTWIDTH] IN BLOOD BY AUTOMATED COUNT: 12.9 % (ref 12.3–15.4)
GLOBULIN UR ELPH-MCNC: 2.7 GM/DL
GLUCOSE BLDC GLUCOMTR-MCNC: 103 MG/DL (ref 70–105)
GLUCOSE BLDC GLUCOMTR-MCNC: 109 MG/DL (ref 70–105)
GLUCOSE BLDC GLUCOMTR-MCNC: 110 MG/DL (ref 70–105)
GLUCOSE BLDC GLUCOMTR-MCNC: 112 MG/DL (ref 70–105)
GLUCOSE BLDC GLUCOMTR-MCNC: 120 MG/DL (ref 70–105)
GLUCOSE SERPL-MCNC: 112 MG/DL (ref 65–99)
HCT VFR BLD AUTO: 34.7 % (ref 37.5–51)
HGB BLD-MCNC: 11.3 G/DL (ref 13–17.7)
LIPASE SERPL-CCNC: 1195 U/L (ref 13–60)
LYMPHOCYTES # BLD AUTO: 1 10*3/MM3 (ref 0.7–3.1)
LYMPHOCYTES NFR BLD AUTO: 20.7 % (ref 19.6–45.3)
MCH RBC QN AUTO: 27 PG (ref 26.6–33)
MCHC RBC AUTO-ENTMCNC: 32.6 G/DL (ref 31.5–35.7)
MCV RBC AUTO: 82.7 FL (ref 79–97)
MONOCYTES # BLD AUTO: 0.4 10*3/MM3 (ref 0.1–0.9)
MONOCYTES NFR BLD AUTO: 8.1 % (ref 5–12)
NEUTROPHILS NFR BLD AUTO: 3.2 10*3/MM3 (ref 1.7–7)
NEUTROPHILS NFR BLD AUTO: 66.9 % (ref 42.7–76)
NRBC BLD AUTO-RTO: 0.1 /100 WBC (ref 0–0.2)
PHOSPHATE SERPL-MCNC: 3.1 MG/DL (ref 2.5–4.5)
PLATELET # BLD AUTO: 433 10*3/MM3 (ref 140–450)
PMV BLD AUTO: 8 FL (ref 6–12)
POTASSIUM SERPL-SCNC: 3.8 MMOL/L (ref 3.5–5.2)
PROT SERPL-MCNC: 6 G/DL (ref 6–8.5)
RBC # BLD AUTO: 4.2 10*6/MM3 (ref 4.14–5.8)
SODIUM SERPL-SCNC: 138 MMOL/L (ref 136–145)
WBC NRBC COR # BLD AUTO: 4.8 10*3/MM3 (ref 3.4–10.8)

## 2023-12-09 PROCEDURE — 25010000002 HYDROMORPHONE 1 MG/ML SOLUTION: Performed by: INTERNAL MEDICINE

## 2023-12-09 PROCEDURE — 83690 ASSAY OF LIPASE: CPT

## 2023-12-09 PROCEDURE — 84100 ASSAY OF PHOSPHORUS: CPT | Performed by: INTERNAL MEDICINE

## 2023-12-09 PROCEDURE — 80053 COMPREHEN METABOLIC PANEL: CPT | Performed by: STUDENT IN AN ORGANIZED HEALTH CARE EDUCATION/TRAINING PROGRAM

## 2023-12-09 PROCEDURE — 85025 COMPLETE CBC W/AUTO DIFF WBC: CPT | Performed by: STUDENT IN AN ORGANIZED HEALTH CARE EDUCATION/TRAINING PROGRAM

## 2023-12-09 PROCEDURE — 25010000002 ENOXAPARIN PER 10 MG: Performed by: HOSPITALIST

## 2023-12-09 PROCEDURE — 99232 SBSQ HOSP IP/OBS MODERATE 35: CPT | Performed by: NURSE PRACTITIONER

## 2023-12-09 PROCEDURE — 86140 C-REACTIVE PROTEIN: CPT | Performed by: NURSE PRACTITIONER

## 2023-12-09 PROCEDURE — 82948 REAGENT STRIP/BLOOD GLUCOSE: CPT

## 2023-12-09 RX ORDER — OXYCODONE HYDROCHLORIDE 5 MG/1
20 TABLET ORAL EVERY 4 HOURS PRN
Status: DISCONTINUED | OUTPATIENT
Start: 2023-12-09 | End: 2023-12-14

## 2023-12-09 RX ADMIN — PANCRELIPASE 12000 UNITS OF LIPASE: 60000; 12000; 38000 CAPSULE, DELAYED RELEASE PELLETS ORAL at 12:20

## 2023-12-09 RX ADMIN — OXYCODONE HYDROCHLORIDE 20 MG: 5 TABLET ORAL at 16:37

## 2023-12-09 RX ADMIN — DOCUSATE SODIUM 50 MG AND SENNOSIDES 8.6 MG 2 TABLET: 8.6; 5 TABLET, FILM COATED ORAL at 08:08

## 2023-12-09 RX ADMIN — OXYCODONE HYDROCHLORIDE 20 MG: 5 TABLET ORAL at 22:53

## 2023-12-09 RX ADMIN — PANCRELIPASE 12000 UNITS OF LIPASE: 60000; 12000; 38000 CAPSULE, DELAYED RELEASE PELLETS ORAL at 18:33

## 2023-12-09 RX ADMIN — DOCUSATE SODIUM 50 MG AND SENNOSIDES 8.6 MG 2 TABLET: 8.6; 5 TABLET, FILM COATED ORAL at 22:54

## 2023-12-09 RX ADMIN — ENOXAPARIN SODIUM 40 MG: 100 INJECTION SUBCUTANEOUS at 16:37

## 2023-12-09 RX ADMIN — HYDROMORPHONE HYDROCHLORIDE 0.5 MG: 1 INJECTION, SOLUTION INTRAMUSCULAR; INTRAVENOUS; SUBCUTANEOUS at 18:33

## 2023-12-09 RX ADMIN — HYDROMORPHONE HYDROCHLORIDE 0.5 MG: 1 INJECTION, SOLUTION INTRAMUSCULAR; INTRAVENOUS; SUBCUTANEOUS at 13:59

## 2023-12-09 RX ADMIN — PANCRELIPASE 12000 UNITS OF LIPASE: 60000; 12000; 38000 CAPSULE, DELAYED RELEASE PELLETS ORAL at 08:08

## 2023-12-09 RX ADMIN — Medication 10 ML: at 08:37

## 2023-12-09 RX ADMIN — OXYCODONE HYDROCHLORIDE 15 MG: 5 TABLET ORAL at 01:09

## 2023-12-09 RX ADMIN — POLYETHYLENE GLYCOL 3350 17 G: 17 POWDER, FOR SOLUTION ORAL at 08:07

## 2023-12-09 RX ADMIN — DEXTROSE AND SODIUM CHLORIDE 150 ML/HR: 5; 450 INJECTION, SOLUTION INTRAVENOUS at 16:37

## 2023-12-09 RX ADMIN — DEXTROSE AND SODIUM CHLORIDE 150 ML/HR: 5; 450 INJECTION, SOLUTION INTRAVENOUS at 08:37

## 2023-12-09 RX ADMIN — OXYCODONE HYDROCHLORIDE 20 MG: 5 TABLET ORAL at 08:07

## 2023-12-09 RX ADMIN — HYDROMORPHONE HYDROCHLORIDE 0.5 MG: 1 INJECTION, SOLUTION INTRAMUSCULAR; INTRAVENOUS; SUBCUTANEOUS at 03:46

## 2023-12-09 RX ADMIN — OXYCODONE HYDROCHLORIDE 20 MG: 5 TABLET ORAL at 12:20

## 2023-12-09 NOTE — PROGRESS NOTES
The patient continues to complain of ongoing pain. He states that the only thing that works for his pain is dilaudid 2mg IV. The patient is currently taking oxycodone 15mg P.O Q4hrs prn pain. The patient is also taking dilaudid PRN. The patient has his tube placed yesterday. He appears to be tolerating it well. No nausea or vomiting. He was also started on IV fluids by GI service. No light headedness or dizziness. Patient is able to follow commands without issue. Patient is passing gas. No bowel movement in the last two days. He did not receive his stool softeners yesterday. No chest pain or SOB. The patient has no fevers, chills, sweats. Patient is passing gas. Case discussed with nursing staff as well. The patient still complains of ongoing abdominal pain. The patient states that the change in pain medication did not help. He is still requesting dilaudid for pain. No nausea or vomiting. The patient has not had a bowel movement in the last two days. No fevers, chills, sweats. Patient is able to follow commands without issue. The patients mother is present in the room today and given an update on the patients condition. He appears in NAD currently. Gi has rounded already today. Patient has agreed to placement of a tube now. No other issues overnight. The patient looks and feels better today. He still has pain and has been taking his pain medication every three hours. He wants to try and eat today. The patient states that the decrease in fluids made his less bloated. No light headedness or dizziness. Patient appears in NAD currently. Bowels are working well. No other changes over the course of the nightThe patient still complains of ongoing abdominal pain. Not worsened by the food which was started yesterday. The patient was able to eat some of the low fat diet yesterday. Bowels are still working well. No fevers, chills, sweats. No nausea or vomiting. No light headedness or dizziness. Patient is able to follow  commands without issue. Overall doing slightly better after eating.      [facility] MEDICINE SERVICE  DAILY PROGRESS NOTE    NAME: Dayron Manley  : 1980  MRN: [id number]      LOS: 8 days     PROVIDER OF SERVICE: Vargas Sotelo DO    Chief Complaint: Acute pancreatitis    Subjective:     Review of Systems:   Review of Systems   Constitutional: Negative.    HENT: Negative.     Gastrointestinal:  Positive for abdominal pain. Negative for abdominal distention and blood in stool.   Genitourinary: Negative.    Neurological: Negative.        Objective:     Vital Signs  Temp:  [98.2 °F (36.8 °C)-98.8 °F (37.1 °C)] 98.5 °F (36.9 °C)  Heart Rate:  [75-91] 84  Resp:  [12-20] 16  BP: (109-133)/(64-93) 133/93   Body mass index is 17.94 kg/m².    Physical Exam  Alert, oriented, pleasant appears to have ongoing pain in his abdomen.   Heart - regular rate and rhythm  Lungs - CTA no wheeze and no ronchi  Abdomen - soft, he continues to have ongoing pain with palpation. Bowel sounds are present. No rebound. He does have some guarding.   Extremities - no edema. He does state that he feels swollen.     Scheduled Meds   enoxaparin, 40 mg, Subcutaneous, Q24H  pancrelipase (Lip-Prot-Amyl), 12,000 units of lipase, Oral, TID With Meals  senna-docusate sodium, 2 tablet, Oral, BID   And  polyethylene glycol, 17 g, Oral, Daily  sodium chloride, 10 mL, Intravenous, Q12H       PRN Meds     senna-docusate sodium **AND** polyethylene glycol **AND** bisacodyl **AND** bisacodyl    dextrose    dextrose    glucagon (human recombinant)    HYDROmorphone    ondansetron    oxyCODONE    prochlorperazine    sodium chloride    sodium chloride    sodium chloride   Infusions  dextrose 5 % and sodium chloride 0.45 %, 150 mL/hr, Last Rate: 150 mL/hr (23 1847)            Diagnostic Data    Results from last 7 days   Lab Units 23  0355   WBC 10*3/mm3 4.80   HEMOGLOBIN g/dL 11.3*   HEMATOCRIT % 34.7*   PLATELETS 10*3/mm3 433   GLUCOSE  mg/dL 112*   CREATININE mg/dL 0.77   BUN mg/dL 2*   SODIUM mmol/L 138   POTASSIUM mmol/L 3.8   AST (SGOT) U/L 15   ALT (SGPT) U/L 14   ALK PHOS U/L 100   BILIRUBIN mg/dL 0.3   ANION GAP mmol/L 10.0       No radiology results for the last day      I reviewed the patient's new clinical results.    Assessment/Plan:     Active and Resolved Problems  Acute pancreatitis  Pancreatic pseudocyst from above  Abdominal pain  -Significantly elevated lipase  - CT showing acute pancreatitis with fluid collection within the pancreatic neck likely representing pseudocyst.  Recommend free fluid in the pelvis related to pancreatitis and tracking inflammatory changes.  Bowels not dilated    -Repeat CT scan on 12/4/2023 showed increase in the size of the pseudocyst    - on low fat and soft GI diet   -decrease fluid hydration. Patient is voiding multiple times per day currently and urine is clear. He is tolerating liquids.   -GI consult appreciated     12/06/2023 - patient would like to try and eat food as opposed to having a tube placed. The patient feels like his pain is well controlled. He is tolerating liquid. I feel this is a reasonable option for him. We can stop the fluids and advance diet and see how he does. If he develops any worsening symptoms after eating then he will agree to have the tube placed in his nose. Labs all look good. Bowels are working well.     12/07/2023 - patient is doing slowly better. Goal to change to oral medications instead of IV. No longer taking IV fluids. Renal function is normal. Gi assistance appreciated.     12/08/2023 - long discussion with patient and mother. The patient will need to start weaning his narcotics. He has taken narcotics for years and I am concerned about him becoming addicted to the medication. The patient will be given a higher dose bowel regiment as well today. The patient is tolerating a liquid diet and has received large amounts of IV fluid hydration since coming into the  hospital. Labs are checked daily. Taper narcotics. Increase stool softeners once procedure is done. Clear liquid diet.     12/09/2023 - patient has tube in place currently. Still complains of a large amount of pain. He took narcotics for over 5 years along with history of drug abuse. It makes it very hard to not only control his pain but concern for underlying addiction. I have talked with about this in detail. He is aware that the dilaudid is being stopped today. Oxycodone 20mg P.O Q4hrs. Aggressive bowel regiment. Senna-s 2 tablets P.O BID Mirilax 17g P.O BID. Dulcolox suppository as needed. Relistor if that does not work. Case discussed with nursing staff as well. Labs look good today.     SINGH from volume depletion- resolved   -Continue IV fluid hydration    UTI  -s/p  ceftriaxone    DVT prophylaxis:  Medical DVT prophylaxis orders are present.     Constipation - patient would benefit from scheduled stool softeners with his ongoing narcotic use. I will schedule the patient on senna-s two tablets P.O BID and miralax 17g P.O Qday. Discussed with patient trying to increase oral pain medications and attempt to taper the dilaudid if possible.     Code status is   Code Status and Medical Interventions:   Ordered at: 12/01/23 0051     Code Status (Patient has no pulse and is not breathing):    CPR (Attempt to Resuscitate)     Medical Interventions (Patient has pulse or is breathing):    Full Support       Plan for disposition:home pending clinical improvement currently requiring IV narcotics for pain control and pseudocyst size increasing    Time: 32 minutes    Signature: Electronically signed by Vargas Sotelo DO, 12/09/23, 07:39 EST.  [facility] Hospitalist Team   NAME:

## 2023-12-09 NOTE — PLAN OF CARE
Problem: Adult Inpatient Plan of Care  Goal: Plan of Care Review  Outcome: Ongoing, Progressing  Goal: Patient-Specific Goal (Individualized)  Outcome: Ongoing, Progressing  Goal: Absence of Hospital-Acquired Illness or Injury  Outcome: Ongoing, Progressing  Intervention: Identify and Manage Fall Risk  Recent Flowsheet Documentation  Taken 12/9/2023 0550 by Fan Smart RN  Safety Promotion/Fall Prevention: safety round/check completed  Taken 12/9/2023 0400 by Fan Smart RN  Safety Promotion/Fall Prevention: safety round/check completed  Taken 12/9/2023 0000 by Fan Smart RN  Safety Promotion/Fall Prevention: safety round/check completed  Taken 12/8/2023 2242 by Fan Smart RN  Safety Promotion/Fall Prevention: safety round/check completed  Taken 12/8/2023 1900 by Fan Smart RN  Safety Promotion/Fall Prevention: safety round/check completed  Intervention: Prevent Skin Injury  Recent Flowsheet Documentation  Taken 12/9/2023 0550 by Fan Smart RN  Body Position: position changed independently  Taken 12/9/2023 0400 by Fan Smart RN  Body Position: position changed independently  Taken 12/9/2023 0000 by Fan Smart RN  Body Position: position changed independently  Taken 12/8/2023 2242 by Fan Smart RN  Body Position: position changed independently  Taken 12/8/2023 1900 by Fan Smart RN  Body Position: position changed independently  Skin Protection: adhesive use limited  Intervention: Prevent and Manage VTE (Venous Thromboembolism) Risk  Recent Flowsheet Documentation  Taken 12/8/2023 1900 by Fan Smart RN  Activity Management: ambulated in room  Intervention: Prevent Infection  Recent Flowsheet Documentation  Taken 12/8/2023 1900 by Fan Smart RN  Infection Prevention: hand hygiene promoted  Goal: Optimal Comfort and Wellbeing  Outcome: Ongoing, Progressing  Intervention: Provide Person-Centered Care  Recent Flowsheet Documentation  Taken 12/8/2023 1900 by Fan Smart RN  Trust  Relationship/Rapport: care explained  Goal: Readiness for Transition of Care  Outcome: Ongoing, Progressing   Goal Outcome Evaluation:

## 2023-12-09 NOTE — PROGRESS NOTES
" LOS: 8 days   Patient Care Team:  Nancy El APRN as PCP - General (Family Medicine)      Subjective   \" Feeling some better\"    INTERVAL HISTORY:   12/8/2023 EGD (Dr. Mendoza) successful NJ tube placement.  Retained fluid in the stomach with minimal changes of gastritis.  Labs: Creatinine 0.77.  LFTs normal.  CRP 7.94 down from 11.69.  Lipase 1195 (1088).  WBCs 4.8, hemoglobin 11.3, platelets 433.  No events overnight.    ROS:   Review of Systems   Constitutional:  Negative for chills and fever.   Respiratory:  Negative for cough and shortness of breath.    Gastrointestinal:  Positive for abdominal pain. Negative for vomiting.   Neurological:  Negative for dizziness.   Psychiatric/Behavioral:  Negative for agitation and confusion.         Medication Review:   Scheduled Meds:enoxaparin, 40 mg, Subcutaneous, Q24H  pancrelipase (Lip-Prot-Amyl), 12,000 units of lipase, Oral, TID With Meals  senna-docusate sodium, 2 tablet, Oral, BID   And  polyethylene glycol, 17 g, Oral, Daily  sodium chloride, 10 mL, Intravenous, Q12H      Continuous Infusions:dextrose 5 % and sodium chloride 0.45 %, 150 mL/hr, Last Rate: 150 mL/hr (12/09/23 0837)      PRN Meds:.  senna-docusate sodium **AND** polyethylene glycol **AND** bisacodyl **AND** bisacodyl    dextrose    dextrose    glucagon (human recombinant)    HYDROmorphone    ondansetron    oxyCODONE    prochlorperazine    sodium chloride    sodium chloride    sodium chloride      Objective resting in the hospital bed.  Appears somewhat stiff trying not to move his head or neck with NG tube in place.    Vital Signs  Temp:  [98.2 °F (36.8 °C)-98.5 °F (36.9 °C)] 98.2 °F (36.8 °C)  Heart Rate:  [75-91] 75  Resp:  [12-20] 16  BP: (113-133)/(64-93) 115/80    Physical Exam:    General Appearance:    Awake and alert, in no acute distress   Head:    Normocephalic, without obvious abnormality NJ tube in left naris.   Eyes:          Conjunctivae normal, anicteric sclera   Ears:    Hearing " intact   Throat:   No oral lesions, no thrush, oral mucosa moist       Lungs:     Respirations regular, even and unlabored       Abdomen:     Soft, upper abdominal tenderness, no rebound or guarding, non-distended   Rectal:     Deferred   Extremities:   No edema, no cyanosis, no redness   Skin:   No bleeding, bruising or rash, no jaundice   Neurologic:   Sensation intact        Results Review:    CBC  Results from last 7 days   Lab Units 12/09/23  0355 12/08/23  0351 12/07/23  0418 12/06/23  0312 12/05/23  0535 12/04/23  0505 12/03/23  1029   RBC 10*6/mm3 4.20 4.44 4.83 4.29 4.99 4.43 4.53   WBC 10*3/mm3 4.80 3.50 3.10* 4.10 6.40 8.40 6.10   HEMOGLOBIN g/dL 11.3* 12.4* 13.1 11.7* 14.0 12.4* 12.5*   PLATELETS 10*3/mm3 433 403 391 292 373 285 322       CMP  Results from last 7 days   Lab Units 12/09/23  0355 12/08/23  0351 12/07/23  0418 12/06/23  0312 12/05/23  0535 12/04/23  0846 12/03/23  0434   SODIUM mmol/L 138 137 139 137 134* 137 138   POTASSIUM mmol/L 3.8 4.0 3.9 3.7 4.1 3.9 3.9   CHLORIDE mmol/L 102 99 99 98 94* 100 100   CO2 mmol/L 26.0 24.0 30.0* 28.0 27.0 25.0 27.0   BUN mg/dL 2* 2* 3* 5* 3* 3* 5*   CREATININE mg/dL 0.77 0.69* 0.81 0.87 0.87 0.67* 0.75*   GLUCOSE mg/dL 112* 101* 104* 83 89 98 91   ALBUMIN g/dL 3.3* 3.1* 3.5 3.1* 3.2* 2.7* 3.0*   BILIRUBIN mg/dL 0.3 0.3 0.4 0.4 0.8 0.6 0.5   ALK PHOS U/L 100 89 96 85 99 83 88   AST (SGOT) U/L 15 16 22 13 13 8 7   ALT (SGPT) U/L 14 18 20 13 14 11 10       Amylase and Lipase  Results from last 7 days   Lab Units 12/09/23  0355 12/08/23  0351 12/07/23  0418 12/06/23  0312 12/05/23  0535 12/04/23  0846   AMYLASE U/L  --   --  487* 558*  --   --    LIPASE U/L 1,195* 1,088* 1,136* 1,099* 748* 760*       CRP     Results from last 7 days   Lab Units 12/09/23  0355 12/08/23  0351 12/07/23  0418 12/06/23  0312 12/05/23  0535 12/04/23  0846 12/03/23  1821   CRP mg/dL 7.94* 11.69* 17.02* 23.04* 24.66* 17.87* 18.84*       Imaging Results (Last 24 Hours)       Procedure  Component Value Units Date/Time    FL < 1 Hour [[id number]] Resulted: 12/08/23 1558     Updated: 12/08/23 1558    Narrative:      This procedure was auto-finalized with no dictation required.              Assessment & Plan   Patient is a 43-year-old male with a history of alcohol abuse who returns to the hospital after being discharged on 11/24/2023.  Patient returns with abdominal pain.  Continues to smoke half pack of cigarettes a day.  Reports sober since 11/3/2023.  Hepatitis panel was negative last admission. Right upper quadrant ultrasound showed no signs of cholelithiasis or acute cholecystitis.  CBD dilation 10 mm.  Triglycerides were normal. CT shows development of fluid collection in the pancreas.     Acute pancreatitis -likely alcohol induced  Abnormal CT showing pancreatitis with pseudocysts  Elevated LFTs RESOLVED  Alcohol abuse  Abnormal drug screen -positive cocaine and opiates  Mild normocytic anemia     PLAN:  NJ tube placed yesterday.  Tolerating tube feeds at 30 cc an hour.  Rates his pain 8 out of 10 but he states this is the lowest it has been since admission.  CRP is going down, lipase is fluctuating.  LFTs are normal.  Continue PPI, IV fluids, Creon  Needs complete cessation of alcohol and tobacco.    Sol Nelson, APRN  12/09/23  12:21 EST

## 2023-12-09 NOTE — PLAN OF CARE
Goal Outcome Evaluation:  Plan of Care Reviewed With: patient        Progress: improving  Outcome Evaluation: Pt on continuous tube feeds via NJ tube. Pt pain controlled with PO and IV pain medication. Call light within reach.

## 2023-12-09 NOTE — PROGRESS NOTES
"Nutrition Services    Patient Name: Dayron Manley  YOB: 1980  MRN: [id number]  Admission date: 11/30/2023    PPE Documentation        PPE Worn By Provider Did not enter room for this encounter   PPE Worn By Patient  N/A     PROGRESS NOTE      Encounter Information: Check in for tube feeding.  S/P EGD with NJ tube placement        PO Diet: NPO Diet NPO Type: Strict NPO   PO Supplements: None ordered   PO Intake:  NPO       Current nutrition support: Isosource 1.5 at 30 mL/hr + 10 mL q2 water flush    Nutrition support review: Documented as above per EMR        Labs (reviewed below): Reviewed, management per attending         GI Function:  BM 12/5 (x 3 days ago) - bowel regimen noted   Residuals N/A related to J-tube        Nutrition Intervention Updates: Gradual advancement in tube feeding to goal rate of 70 mL/hour        Results from last 7 days   Lab Units 12/09/23  0355 12/08/23  0351 12/07/23  0418   SODIUM mmol/L 138 137 139   POTASSIUM mmol/L 3.8 4.0 3.9   CHLORIDE mmol/L 102 99 99   CO2 mmol/L 26.0 24.0 30.0*   BUN mg/dL 2* 2* 3*   CREATININE mg/dL 0.77 0.69* 0.81   CALCIUM mg/dL 8.5* 9.0 9.4   BILIRUBIN mg/dL 0.3 0.3 0.4   ALK PHOS U/L 100 89 96   ALT (SGPT) U/L 14 18 20   AST (SGOT) U/L 15 16 22   GLUCOSE mg/dL 112* 101* 104*     Results from last 7 days   Lab Units 12/09/23  0355 12/07/23  0418 12/06/23  0312   MAGNESIUM mg/dL  --   --  1.9   PHOSPHORUS mg/dL 3.1  --  3.7   HEMOGLOBIN g/dL 11.3*   < > 11.7*   HEMATOCRIT % 34.7*   < > 35.7*    < > = values in this interval not displayed.     No results found for: \"COVID19\"  Lab Results   Component Value Date    HGBA1C 5.10 11/30/2023     RD to follow up per protocol.    Electronically signed by:  Devi Hodge RD  12/09/23 13:57 EST    "

## 2023-12-10 ENCOUNTER — INPATIENT HOSPITAL (OUTPATIENT)
Dept: URBAN - METROPOLITAN AREA HOSPITAL 84 | Facility: HOSPITAL | Age: 43
End: 2023-12-10

## 2023-12-10 DIAGNOSIS — R93.3 ABNORMAL FINDINGS ON DIAGNOSTIC IMAGING OF OTHER PARTS OF DI: ICD-10-CM

## 2023-12-10 DIAGNOSIS — F10.10 ALCOHOL ABUSE, UNCOMPLICATED: ICD-10-CM

## 2023-12-10 DIAGNOSIS — D64.9 ANEMIA, UNSPECIFIED: ICD-10-CM

## 2023-12-10 DIAGNOSIS — K85.90 ACUTE PANCREATITIS WITHOUT NECROSIS OR INFECTION, UNSPECIFIE: ICD-10-CM

## 2023-12-10 LAB
ALBUMIN SERPL-MCNC: 3.2 G/DL (ref 3.5–5.2)
ALBUMIN/GLOB SERPL: 1.2 G/DL
ALP SERPL-CCNC: 102 U/L (ref 39–117)
ALT SERPL W P-5'-P-CCNC: 19 U/L (ref 1–41)
ANION GAP SERPL CALCULATED.3IONS-SCNC: 7 MMOL/L (ref 5–15)
AST SERPL-CCNC: 20 U/L (ref 1–40)
BASOPHILS # BLD AUTO: 0 10*3/MM3 (ref 0–0.2)
BASOPHILS NFR BLD AUTO: 0.5 % (ref 0–1.5)
BILIRUB SERPL-MCNC: 0.3 MG/DL (ref 0–1.2)
BUN SERPL-MCNC: 3 MG/DL (ref 6–20)
BUN/CREAT SERPL: 3.9 (ref 7–25)
CALCIUM SPEC-SCNC: 8.5 MG/DL (ref 8.6–10.5)
CHLORIDE SERPL-SCNC: 106 MMOL/L (ref 98–107)
CO2 SERPL-SCNC: 28 MMOL/L (ref 22–29)
CREAT SERPL-MCNC: 0.76 MG/DL (ref 0.76–1.27)
CRP SERPL-MCNC: 3.86 MG/DL (ref 0–0.5)
DEPRECATED RDW RBC AUTO: 39.8 FL (ref 37–54)
EGFRCR SERPLBLD CKD-EPI 2021: 115.1 ML/MIN/1.73
EOSINOPHIL # BLD AUTO: 0.2 10*3/MM3 (ref 0–0.4)
EOSINOPHIL NFR BLD AUTO: 4.9 % (ref 0.3–6.2)
ERYTHROCYTE [DISTWIDTH] IN BLOOD BY AUTOMATED COUNT: 13.4 % (ref 12.3–15.4)
GLOBULIN UR ELPH-MCNC: 2.6 GM/DL
GLUCOSE BLDC GLUCOMTR-MCNC: 103 MG/DL (ref 70–105)
GLUCOSE BLDC GLUCOMTR-MCNC: 103 MG/DL (ref 70–105)
GLUCOSE BLDC GLUCOMTR-MCNC: 105 MG/DL (ref 70–105)
GLUCOSE BLDC GLUCOMTR-MCNC: 84 MG/DL (ref 70–105)
GLUCOSE SERPL-MCNC: 102 MG/DL (ref 65–99)
HCT VFR BLD AUTO: 36.2 % (ref 37.5–51)
HGB BLD-MCNC: 11.9 G/DL (ref 13–17.7)
LIPASE SERPL-CCNC: 1359 U/L (ref 13–60)
LYMPHOCYTES # BLD AUTO: 1 10*3/MM3 (ref 0.7–3.1)
LYMPHOCYTES NFR BLD AUTO: 21.8 % (ref 19.6–45.3)
MAGNESIUM SERPL-MCNC: 2.3 MG/DL (ref 1.6–2.6)
MCH RBC QN AUTO: 27.8 PG (ref 26.6–33)
MCHC RBC AUTO-ENTMCNC: 32.9 G/DL (ref 31.5–35.7)
MCV RBC AUTO: 84.6 FL (ref 79–97)
MONOCYTES # BLD AUTO: 0.4 10*3/MM3 (ref 0.1–0.9)
MONOCYTES NFR BLD AUTO: 9 % (ref 5–12)
NEUTROPHILS NFR BLD AUTO: 3.1 10*3/MM3 (ref 1.7–7)
NEUTROPHILS NFR BLD AUTO: 63.8 % (ref 42.7–76)
NRBC BLD AUTO-RTO: 0 /100 WBC (ref 0–0.2)
PHOSPHATE SERPL-MCNC: 2.9 MG/DL (ref 2.5–4.5)
PLATELET # BLD AUTO: 409 10*3/MM3 (ref 140–450)
PMV BLD AUTO: 7.9 FL (ref 6–12)
POTASSIUM SERPL-SCNC: 3.9 MMOL/L (ref 3.5–5.2)
PROT SERPL-MCNC: 5.8 G/DL (ref 6–8.5)
RBC # BLD AUTO: 4.28 10*6/MM3 (ref 4.14–5.8)
SODIUM SERPL-SCNC: 141 MMOL/L (ref 136–145)
WBC NRBC COR # BLD AUTO: 4.8 10*3/MM3 (ref 3.4–10.8)

## 2023-12-10 PROCEDURE — 82948 REAGENT STRIP/BLOOD GLUCOSE: CPT

## 2023-12-10 PROCEDURE — 83690 ASSAY OF LIPASE: CPT | Performed by: NURSE PRACTITIONER

## 2023-12-10 PROCEDURE — 25010000002 ENOXAPARIN PER 10 MG: Performed by: HOSPITALIST

## 2023-12-10 PROCEDURE — 83735 ASSAY OF MAGNESIUM: CPT | Performed by: INTERNAL MEDICINE

## 2023-12-10 PROCEDURE — 85025 COMPLETE CBC W/AUTO DIFF WBC: CPT | Performed by: STUDENT IN AN ORGANIZED HEALTH CARE EDUCATION/TRAINING PROGRAM

## 2023-12-10 PROCEDURE — 86140 C-REACTIVE PROTEIN: CPT | Performed by: NURSE PRACTITIONER

## 2023-12-10 PROCEDURE — 25010000002 HYDROMORPHONE 1 MG/ML SOLUTION: Performed by: INTERNAL MEDICINE

## 2023-12-10 PROCEDURE — 80053 COMPREHEN METABOLIC PANEL: CPT | Performed by: STUDENT IN AN ORGANIZED HEALTH CARE EDUCATION/TRAINING PROGRAM

## 2023-12-10 PROCEDURE — 84100 ASSAY OF PHOSPHORUS: CPT | Performed by: INTERNAL MEDICINE

## 2023-12-10 PROCEDURE — 99232 SBSQ HOSP IP/OBS MODERATE 35: CPT | Performed by: NURSE PRACTITIONER

## 2023-12-10 RX ADMIN — HYDROMORPHONE HYDROCHLORIDE 0.5 MG: 1 INJECTION, SOLUTION INTRAMUSCULAR; INTRAVENOUS; SUBCUTANEOUS at 05:45

## 2023-12-10 RX ADMIN — POLYETHYLENE GLYCOL 3350 17 G: 17 POWDER, FOR SOLUTION ORAL at 09:12

## 2023-12-10 RX ADMIN — HYDROMORPHONE HYDROCHLORIDE 0.5 MG: 1 INJECTION, SOLUTION INTRAMUSCULAR; INTRAVENOUS; SUBCUTANEOUS at 15:04

## 2023-12-10 RX ADMIN — OXYCODONE HYDROCHLORIDE 20 MG: 5 TABLET ORAL at 17:53

## 2023-12-10 RX ADMIN — PANCRELIPASE 12000 UNITS OF LIPASE: 60000; 12000; 38000 CAPSULE, DELAYED RELEASE PELLETS ORAL at 13:53

## 2023-12-10 RX ADMIN — OXYCODONE HYDROCHLORIDE 20 MG: 5 TABLET ORAL at 03:50

## 2023-12-10 RX ADMIN — HYDROMORPHONE HYDROCHLORIDE 0.5 MG: 1 INJECTION, SOLUTION INTRAMUSCULAR; INTRAVENOUS; SUBCUTANEOUS at 19:43

## 2023-12-10 RX ADMIN — OXYCODONE HYDROCHLORIDE 20 MG: 5 TABLET ORAL at 13:53

## 2023-12-10 RX ADMIN — ENOXAPARIN SODIUM 40 MG: 100 INJECTION SUBCUTANEOUS at 15:01

## 2023-12-10 RX ADMIN — OXYCODONE HYDROCHLORIDE 20 MG: 5 TABLET ORAL at 09:12

## 2023-12-10 RX ADMIN — DOCUSATE SODIUM 50 MG AND SENNOSIDES 8.6 MG 2 TABLET: 8.6; 5 TABLET, FILM COATED ORAL at 09:12

## 2023-12-10 RX ADMIN — Medication 10 ML: at 09:13

## 2023-12-10 RX ADMIN — Medication 10 ML: at 19:43

## 2023-12-10 RX ADMIN — OXYCODONE HYDROCHLORIDE 20 MG: 5 TABLET ORAL at 22:36

## 2023-12-10 RX ADMIN — HYDROMORPHONE HYDROCHLORIDE 0.5 MG: 1 INJECTION, SOLUTION INTRAMUSCULAR; INTRAVENOUS; SUBCUTANEOUS at 10:49

## 2023-12-10 RX ADMIN — PANCRELIPASE 12000 UNITS OF LIPASE: 60000; 12000; 38000 CAPSULE, DELAYED RELEASE PELLETS ORAL at 09:12

## 2023-12-10 RX ADMIN — PANCRELIPASE 12000 UNITS OF LIPASE: 60000; 12000; 38000 CAPSULE, DELAYED RELEASE PELLETS ORAL at 17:17

## 2023-12-10 RX ADMIN — DEXTROSE AND SODIUM CHLORIDE 150 ML/HR: 5; 450 INJECTION, SOLUTION INTRAVENOUS at 17:52

## 2023-12-10 NOTE — PROGRESS NOTES
" LOS: 9 days   Patient Care Team:  Nancy El APRN as PCP - General (Family Medicine)      Subjective   \"Feeling better. Dreaming of food\"    INTERVAL HISTORY:   Labs: CRP down to 3.86 (7.94).  WBCs 4.8, hemoglobin 1.9, platelets 409.  Creatinine 0.76.  Sodium potassium are normal.  LFTs remain normal.  Still tolerating tube feeds at 60 cc an hour.  No bowel movement    ROS:   Review of Systems   Constitutional:  Negative for chills and fever.   Respiratory:  Negative for cough and shortness of breath.    Gastrointestinal:  Positive for abdominal pain. Negative for vomiting.   Neurological:  Negative for dizziness.   Psychiatric/Behavioral:  Negative for agitation and confusion.         Medication Review:   Scheduled Meds:enoxaparin, 40 mg, Subcutaneous, Q24H  pancrelipase (Lip-Prot-Amyl), 12,000 units of lipase, Oral, TID With Meals  senna-docusate sodium, 2 tablet, Oral, BID   And  polyethylene glycol, 17 g, Oral, Daily  sodium chloride, 10 mL, Intravenous, Q12H      Continuous Infusions:dextrose 5 % and sodium chloride 0.45 %, 150 mL/hr, Last Rate: 150 mL/hr (12/09/23 1637)      PRN Meds:.  senna-docusate sodium **AND** polyethylene glycol **AND** bisacodyl **AND** bisacodyl    dextrose    dextrose    glucagon (human recombinant)    HYDROmorphone    ondansetron    oxyCODONE    prochlorperazine    sodium chloride    sodium chloride    sodium chloride      Objective  Resting in the hospital bed.  No family present today.      Vital Signs  Temp:  [98 °F (36.7 °C)-98.3 °F (36.8 °C)] 98.1 °F (36.7 °C)  Heart Rate:  [70-78] 70  Resp:  [12-16] 12  BP: (107-125)/(73-82) 116/78    Physical Exam:    General Appearance:    Awake and alert, in no acute distress   Head:    Normocephalic, without obvious abnormality NJ tube in left naris.   Eyes:          Conjunctivae normal, anicteric sclera   Ears:    Hearing intact   Throat:   No oral lesions, no thrush, oral mucosa moist       Lungs:     Respirations regular, even and " unlabored       Abdomen:     Soft, upper abdominal tenderness, no rebound or guarding, non-distended   Rectal:     Deferred   Extremities:   No edema, no cyanosis, no redness   Skin:   No bleeding, bruising or rash, no jaundice   Neurologic:   Sensation intact        Results Review:    CBC  Results from last 7 days   Lab Units 12/10/23  0632 12/09/23  0355 12/08/23  0351 12/07/23  0418 12/06/23  0312 12/05/23  0535 12/04/23  0505   RBC 10*6/mm3 4.28 4.20 4.44 4.83 4.29 4.99 4.43   WBC 10*3/mm3 4.80 4.80 3.50 3.10* 4.10 6.40 8.40   HEMOGLOBIN g/dL 11.9* 11.3* 12.4* 13.1 11.7* 14.0 12.4*   PLATELETS 10*3/mm3 409 433 403 391 292 373 285       CMP  Results from last 7 days   Lab Units 12/10/23  0632 12/09/23  0355 12/08/23  0351 12/07/23  0418 12/06/23  0312 12/05/23  0535 12/04/23  0846   SODIUM mmol/L 141 138 137 139 137 134* 137   POTASSIUM mmol/L 3.9 3.8 4.0 3.9 3.7 4.1 3.9   CHLORIDE mmol/L 106 102 99 99 98 94* 100   CO2 mmol/L 28.0 26.0 24.0 30.0* 28.0 27.0 25.0   BUN mg/dL 3* 2* 2* 3* 5* 3* 3*   CREATININE mg/dL 0.76 0.77 0.69* 0.81 0.87 0.87 0.67*   GLUCOSE mg/dL 102* 112* 101* 104* 83 89 98   ALBUMIN g/dL 3.2* 3.3* 3.1* 3.5 3.1* 3.2* 2.7*   BILIRUBIN mg/dL 0.3 0.3 0.3 0.4 0.4 0.8 0.6   ALK PHOS U/L 102 100 89 96 85 99 83   AST (SGOT) U/L 20 15 16 22 13 13 8   ALT (SGPT) U/L 19 14 18 20 13 14 11       Amylase and Lipase  Results from last 7 days   Lab Units 12/09/23  0355 12/08/23  0351 12/07/23  0418 12/06/23  0312 12/05/23  0535 12/04/23  0846   AMYLASE U/L  --   --  487* 558*  --   --    LIPASE U/L 1,195* 1,088* 1,136* 1,099* 748* 760*       CRP     Results from last 7 days   Lab Units 12/10/23  0632 12/09/23  0355 12/08/23  0351 12/07/23  0418 12/06/23  0312 12/05/23  0535 12/04/23  0846   CRP mg/dL 3.86* 7.94* 11.69* 17.02* 23.04* 24.66* 17.87*       Imaging Results (Last 24 Hours)       ** No results found for the last 24 hours. **              Assessment & Plan   Patient is a 43-year-old male with a  history of alcohol abuse who returns to the hospital after being discharged on 11/24/2023.  Patient returns with abdominal pain.  Continues to smoke half pack of cigarettes a day.  Reports sober since 11/3/2023.  Hepatitis panel was negative last admission. Right upper quadrant ultrasound showed no signs of cholelithiasis or acute cholecystitis.  CBD dilation 10 mm.  Triglycerides were normal. CT shows development of fluid collection in the pancreas.     Acute pancreatitis -likely alcohol induced  Abnormal CT showing pancreatitis with pseudocysts  Elevated LFTs RESOLVED  Alcohol abuse  Abnormal drug screen -positive cocaine and opiates  Mild normocytic anemia    12/8/2023 EGD (Dr. Mendoza) successful NJ tube placement.  Retained fluid in the stomach with minimal changes of gastritis.     PLAN:  Tolerating tube feeds at 60 cc an hour.  He is hungry.  Will discuss trialing some liquids with Dr. Nunes when he rounds.  Still getting IV fluids at 150 cc an hour.  CRP continues to go down.  Pending lipase today.  Continue PPI, IV fluids, Creon  Needs complete cessation of alcohol and tobacco.    Sol Nelson, APRN  12/10/23  10:23 EST

## 2023-12-10 NOTE — PROGRESS NOTES
[facility] MEDICINE SERVICE  DAILY PROGRESS NOTE    Patient Name: Dayron Manley  : 1980  MRN: [id number]  Primary Care Physician:  Nancy El APRN  Date of admission: 2023  Date of service: 12/10/23      Subjective      Chief Complaint: Abdominal pain    Patient Reports patient is feeling better.  Abdomen is resolved.  He has an NG tube in place.  No nausea vomiting.    ROS A 12 point review of system was done and was negative except as mentioned above      Objective      Vitals:   Temp:  [97.5 °F (36.4 °C)-98.3 °F (36.8 °C)] 97.5 °F (36.4 °C)  Heart Rate:  [70-78] 70  Resp:  [12-16] 14  BP: (107-125)/(73-82) 117/73    Physical Exam  Constitutional:       Appearance: Normal appearance.   HENT:      Head: Normocephalic and atraumatic.      Nose: Nose normal.   Cardiovascular:      Rate and Rhythm: Normal rate.      Heart sounds: Normal heart sounds.   Pulmonary:      Effort: Pulmonary effort is normal. No respiratory distress.      Breath sounds: Normal breath sounds. No stridor. No wheezing, rhonchi or rales.   Chest:      Chest wall: No tenderness.   Abdominal:      General: Abdomen is flat. There is no distension.      Palpations: Abdomen is soft. There is no mass.      Tenderness: There is no abdominal tenderness. There is no right CVA tenderness, left CVA tenderness, guarding or rebound.      Comments: NG tube in place   Musculoskeletal:      Cervical back: Normal range of motion.   Skin:     General: Skin is warm and dry.   Neurological:      General: No focal deficit present.      Mental Status: He is alert.   Psychiatric:         Mood and Affect: Mood normal.         Behavior: Behavior normal.             Result Review    Result Review:  I have personally reviewed the results from the time of this admission to 12/10/2023 12:06 EST and agree with these findings:  [x]  Laboratory  []  Microbiology  [x]  Radiology  []  EKG/Telemetry   []  Cardiology/Vascular   []  Pathology  []   Old records  []  Other:            Assessment & Plan      Brief Patient Summary:  Dayron Manley is a 43 y.o. male with no significant past medical history who presented to [facility] on 11/30/2023 with abdominal pain. Per patient, he has had abdominal pain for the past few days.  Abdominal pain is located in the epigastric area, and rated as a 10/10 on a pain scale..  Patient was recently admitted 11/19 and discharged on 11/24 after being treated for acute pancreatitis.   In the ED, vital signs were stable.  Labs remarkable for creatinine 1.85, lipase 2138, WBC 14.3, urinalysis positive for nitrates, leukocytes and proteins.  Alcohol level was negative.  CT abdomen shows acute pancreatitis with new fluid collection seen within the pancreatic neck and body likely representing pseudocyst.      enoxaparin, 40 mg, Subcutaneous, Q24H  pancrelipase (Lip-Prot-Amyl), 12,000 units of lipase, Oral, TID With Meals  senna-docusate sodium, 2 tablet, Oral, BID   And  polyethylene glycol, 17 g, Oral, Daily  sodium chloride, 10 mL, Intravenous, Q12H       dextrose 5 % and sodium chloride 0.45 %, 150 mL/hr, Last Rate: 150 mL/hr (12/09/23 1637)         Active Hospital Problems:  Active Hospital Problems    Diagnosis     **Acute pancreatitis      Plan:     Acute pancreatitis  Pancreatic pseudocyst  Abdominal pain  -Significantly elevated lipase  - CT showing acute pancreatitis with fluid collection within the pancreatic neck likely representing pseudocyst.  Recommend free fluid in the pelvis related to pancreatitis and tracking inflammatory changes.  Bowels not dilated  His abdominal pain is better.  He is status post NG tube placement.  ESOPHAGOGASTRODUODENOSCOPY with NJ tube placement.  He is tolerating G-tube feeds.  GI to, decide about p.o. feeds.  IV fluids, continue IV analgesics  Follow-up electrolytes  -Tobacco and alcohol abstinence.     SINGH from volume depletion- resolved   -Continue IV fluid hydration  -Follow  BMP      Constipation -   senna-s two tablets P.O BID and miralax 17g P.O Qday.          DVT prophylaxis:  Medical DVT prophylaxis orders are present.    CODE STATUS:    Code Status (Patient has no pulse and is not breathing): CPR (Attempt to Resuscitate)  Medical Interventions (Patient has pulse or is breathing): Full Support      Disposition:  I expect patient to be discharged 2 to 3 days    I discussed the patient's findings and my recommendations with the patient.    Electronically signed by Dianne Yoo MD, 12/10/23, 12:06 [facility].  [facility] Hospitalist Team

## 2023-12-10 NOTE — PROGRESS NOTES
"Nutrition Services    Patient Name: Dayron Manley  YOB: 1980  MRN: [id number]  Admission date: 11/30/2023    PPE Documentation        PPE Worn By Provider Did not enter room for this encounter   PPE Worn By Patient  N/A     PROGRESS NOTE      Encounter Information: Check in for tube feeding.  S/P EGD with NJ tube placement        PO Diet: NPO Diet NPO Type: Strict NPO   PO Supplements: None ordered   PO Intake:  NPO       Current nutrition support: Isosource 1.5 at 70 mL/hr + 10 mL q2 water flush    Nutrition support review: Last documented at 60 mL per EMR        Labs (reviewed below): Reviewed, management per attending         GI Function:  BM 12/5 (x 4 days ago) - bowel regimen noted   Residuals N/A related to J-tube        Nutrition Intervention Updates: Continue current EN prescription, at goal        Results from last 7 days   Lab Units 12/10/23  0632 12/09/23  0355 12/08/23  0351   SODIUM mmol/L 141 138 137   POTASSIUM mmol/L 3.9 3.8 4.0   CHLORIDE mmol/L 106 102 99   CO2 mmol/L 28.0 26.0 24.0   BUN mg/dL 3* 2* 2*   CREATININE mg/dL 0.76 0.77 0.69*   CALCIUM mg/dL 8.5* 8.5* 9.0   BILIRUBIN mg/dL 0.3 0.3 0.3   ALK PHOS U/L 102 100 89   ALT (SGPT) U/L 19 14 18   AST (SGOT) U/L 20 15 16   GLUCOSE mg/dL 102* 112* 101*     Results from last 7 days   Lab Units 12/10/23  0632 12/07/23  0418 12/06/23  0312   MAGNESIUM mg/dL 2.3  --  1.9   PHOSPHORUS mg/dL 2.9   < > 3.7   HEMOGLOBIN g/dL 11.9*   < > 11.7*   HEMATOCRIT % 36.2*   < > 35.7*    < > = values in this interval not displayed.     No results found for: \"COVID19\"  Lab Results   Component Value Date    HGBA1C 5.10 11/30/2023     RD to follow up per protocol.    Electronically signed by:  Devi Hodge, KIRIT  12/10/23 12:35 EST    "

## 2023-12-10 NOTE — PLAN OF CARE
Goal Outcome Evaluation:  Plan of Care Reviewed With: patient      Patient resting in bed and pain controlled with IV and PO medications. Will continue to monitor.

## 2023-12-10 NOTE — PLAN OF CARE
Problem: Adult Inpatient Plan of Care  Goal: Plan of Care Review  Outcome: Ongoing, Progressing  Goal: Patient-Specific Goal (Individualized)  Outcome: Ongoing, Progressing  Goal: Absence of Hospital-Acquired Illness or Injury  Outcome: Ongoing, Progressing  Intervention: Identify and Manage Fall Risk  Recent Flowsheet Documentation  Taken 12/9/2023 2000 by Fan Smart RN  Safety Promotion/Fall Prevention: safety round/check completed  Intervention: Prevent Skin Injury  Recent Flowsheet Documentation  Taken 12/9/2023 2000 by Fan Smart RN  Body Position:   position changed independently   30 degrees  Intervention: Prevent Infection  Recent Flowsheet Documentation  Taken 12/9/2023 2000 by Fan Smart RN  Infection Prevention: personal protective equipment utilized  Goal: Optimal Comfort and Wellbeing  Outcome: Ongoing, Progressing  Goal: Readiness for Transition of Care  Outcome: Ongoing, Progressing   Goal Outcome Evaluation:

## 2023-12-11 ENCOUNTER — INPATIENT HOSPITAL (OUTPATIENT)
Dept: URBAN - METROPOLITAN AREA HOSPITAL 84 | Facility: HOSPITAL | Age: 43
End: 2023-12-11

## 2023-12-11 DIAGNOSIS — K85.90 ACUTE PANCREATITIS WITHOUT NECROSIS OR INFECTION, UNSPECIFIE: ICD-10-CM

## 2023-12-11 LAB
ALBUMIN SERPL-MCNC: 3.2 G/DL (ref 3.5–5.2)
ALBUMIN/GLOB SERPL: 1.2 G/DL
ALP SERPL-CCNC: 90 U/L (ref 39–117)
ALT SERPL W P-5'-P-CCNC: 11 U/L (ref 1–41)
ANION GAP SERPL CALCULATED.3IONS-SCNC: 9 MMOL/L (ref 5–15)
AST SERPL-CCNC: 11 U/L (ref 1–40)
BASOPHILS # BLD AUTO: 0 10*3/MM3 (ref 0–0.2)
BASOPHILS NFR BLD AUTO: 0.5 % (ref 0–1.5)
BILIRUB SERPL-MCNC: 0.3 MG/DL (ref 0–1.2)
BUN SERPL-MCNC: 6 MG/DL (ref 6–20)
BUN/CREAT SERPL: 9.2 (ref 7–25)
CALCIUM SPEC-SCNC: 8.8 MG/DL (ref 8.6–10.5)
CHLORIDE SERPL-SCNC: 106 MMOL/L (ref 98–107)
CO2 SERPL-SCNC: 25 MMOL/L (ref 22–29)
CREAT SERPL-MCNC: 0.65 MG/DL (ref 0.76–1.27)
CRP SERPL-MCNC: 2.61 MG/DL (ref 0–0.5)
DEPRECATED RDW RBC AUTO: 39.8 FL (ref 37–54)
EGFRCR SERPLBLD CKD-EPI 2021: 120.7 ML/MIN/1.73
EOSINOPHIL # BLD AUTO: 0.2 10*3/MM3 (ref 0–0.4)
EOSINOPHIL NFR BLD AUTO: 5.9 % (ref 0.3–6.2)
ERYTHROCYTE [DISTWIDTH] IN BLOOD BY AUTOMATED COUNT: 13.1 % (ref 12.3–15.4)
GLOBULIN UR ELPH-MCNC: 2.6 GM/DL
GLUCOSE BLDC GLUCOMTR-MCNC: 101 MG/DL (ref 70–105)
GLUCOSE BLDC GLUCOMTR-MCNC: 106 MG/DL (ref 70–105)
GLUCOSE BLDC GLUCOMTR-MCNC: 108 MG/DL (ref 70–105)
GLUCOSE BLDC GLUCOMTR-MCNC: 111 MG/DL (ref 70–105)
GLUCOSE BLDC GLUCOMTR-MCNC: 96 MG/DL (ref 70–105)
GLUCOSE SERPL-MCNC: 111 MG/DL (ref 65–99)
HCT VFR BLD AUTO: 33.7 % (ref 37.5–51)
HGB BLD-MCNC: 11.2 G/DL (ref 13–17.7)
LIPASE SERPL-CCNC: 1232 U/L (ref 13–60)
LYMPHOCYTES # BLD AUTO: 1 10*3/MM3 (ref 0.7–3.1)
LYMPHOCYTES NFR BLD AUTO: 25.1 % (ref 19.6–45.3)
MCH RBC QN AUTO: 27.5 PG (ref 26.6–33)
MCHC RBC AUTO-ENTMCNC: 33.4 G/DL (ref 31.5–35.7)
MCV RBC AUTO: 82.6 FL (ref 79–97)
MONOCYTES # BLD AUTO: 0.3 10*3/MM3 (ref 0.1–0.9)
MONOCYTES NFR BLD AUTO: 7.7 % (ref 5–12)
NEUTROPHILS NFR BLD AUTO: 2.5 10*3/MM3 (ref 1.7–7)
NEUTROPHILS NFR BLD AUTO: 60.8 % (ref 42.7–76)
NRBC BLD AUTO-RTO: 0 /100 WBC (ref 0–0.2)
PLATELET # BLD AUTO: 367 10*3/MM3 (ref 140–450)
PMV BLD AUTO: 8 FL (ref 6–12)
POTASSIUM SERPL-SCNC: 4.2 MMOL/L (ref 3.5–5.2)
PROT SERPL-MCNC: 5.8 G/DL (ref 6–8.5)
RBC # BLD AUTO: 4.08 10*6/MM3 (ref 4.14–5.8)
SODIUM SERPL-SCNC: 140 MMOL/L (ref 136–145)
WBC NRBC COR # BLD AUTO: 4.1 10*3/MM3 (ref 3.4–10.8)

## 2023-12-11 PROCEDURE — 82948 REAGENT STRIP/BLOOD GLUCOSE: CPT

## 2023-12-11 PROCEDURE — 86140 C-REACTIVE PROTEIN: CPT | Performed by: NURSE PRACTITIONER

## 2023-12-11 PROCEDURE — 85025 COMPLETE CBC W/AUTO DIFF WBC: CPT | Performed by: STUDENT IN AN ORGANIZED HEALTH CARE EDUCATION/TRAINING PROGRAM

## 2023-12-11 PROCEDURE — 25010000002 HYDROMORPHONE 1 MG/ML SOLUTION: Performed by: INTERNAL MEDICINE

## 2023-12-11 PROCEDURE — 25010000002 ENOXAPARIN PER 10 MG: Performed by: HOSPITALIST

## 2023-12-11 PROCEDURE — 99232 SBSQ HOSP IP/OBS MODERATE 35: CPT | Performed by: NURSE PRACTITIONER

## 2023-12-11 PROCEDURE — 80053 COMPREHEN METABOLIC PANEL: CPT | Performed by: STUDENT IN AN ORGANIZED HEALTH CARE EDUCATION/TRAINING PROGRAM

## 2023-12-11 PROCEDURE — 83690 ASSAY OF LIPASE: CPT | Performed by: NURSE PRACTITIONER

## 2023-12-11 RX ADMIN — OXYCODONE HYDROCHLORIDE 20 MG: 5 TABLET ORAL at 16:25

## 2023-12-11 RX ADMIN — Medication 10 ML: at 20:35

## 2023-12-11 RX ADMIN — Medication 10 ML: at 00:18

## 2023-12-11 RX ADMIN — HYDROMORPHONE HYDROCHLORIDE 0.5 MG: 1 INJECTION, SOLUTION INTRAMUSCULAR; INTRAVENOUS; SUBCUTANEOUS at 04:29

## 2023-12-11 RX ADMIN — POLYETHYLENE GLYCOL 3350 17 G: 17 POWDER, FOR SOLUTION ORAL at 09:04

## 2023-12-11 RX ADMIN — ENOXAPARIN SODIUM 40 MG: 100 INJECTION SUBCUTANEOUS at 16:24

## 2023-12-11 RX ADMIN — HYDROMORPHONE HYDROCHLORIDE 0.5 MG: 1 INJECTION, SOLUTION INTRAMUSCULAR; INTRAVENOUS; SUBCUTANEOUS at 00:18

## 2023-12-11 RX ADMIN — DOCUSATE SODIUM 50 MG AND SENNOSIDES 8.6 MG 2 TABLET: 8.6; 5 TABLET, FILM COATED ORAL at 09:04

## 2023-12-11 RX ADMIN — DEXTROSE AND SODIUM CHLORIDE 150 ML/HR: 5; 450 INJECTION, SOLUTION INTRAVENOUS at 00:19

## 2023-12-11 RX ADMIN — DOCUSATE SODIUM 50 MG AND SENNOSIDES 8.6 MG 2 TABLET: 8.6; 5 TABLET, FILM COATED ORAL at 20:35

## 2023-12-11 RX ADMIN — OXYCODONE HYDROCHLORIDE 20 MG: 5 TABLET ORAL at 20:35

## 2023-12-11 RX ADMIN — HYDROMORPHONE HYDROCHLORIDE 0.5 MG: 1 INJECTION, SOLUTION INTRAMUSCULAR; INTRAVENOUS; SUBCUTANEOUS at 09:15

## 2023-12-11 RX ADMIN — Medication 10 ML: at 04:30

## 2023-12-11 RX ADMIN — DEXTROSE AND SODIUM CHLORIDE 150 ML/HR: 5; 450 INJECTION, SOLUTION INTRAVENOUS at 08:00

## 2023-12-11 RX ADMIN — DEXTROSE AND SODIUM CHLORIDE 150 ML/HR: 5; 450 INJECTION, SOLUTION INTRAVENOUS at 14:29

## 2023-12-11 RX ADMIN — PANCRELIPASE 12000 UNITS OF LIPASE: 60000; 12000; 38000 CAPSULE, DELAYED RELEASE PELLETS ORAL at 18:01

## 2023-12-11 RX ADMIN — PANCRELIPASE 12000 UNITS OF LIPASE: 60000; 12000; 38000 CAPSULE, DELAYED RELEASE PELLETS ORAL at 08:01

## 2023-12-11 RX ADMIN — OXYCODONE HYDROCHLORIDE 20 MG: 5 TABLET ORAL at 12:18

## 2023-12-11 RX ADMIN — PANCRELIPASE 12000 UNITS OF LIPASE: 60000; 12000; 38000 CAPSULE, DELAYED RELEASE PELLETS ORAL at 12:09

## 2023-12-11 RX ADMIN — OXYCODONE HYDROCHLORIDE 20 MG: 5 TABLET ORAL at 08:01

## 2023-12-11 NOTE — CASE MANAGEMENT/SOCIAL WORK
Continued Stay Note  [facility]     Patient Name: Dayron Manley  MRN: [id number]  Today's Date: 12/11/2023    Admit Date: 11/30/2023    Plan: Return home alone. Meds to Bed   Discharge Plan       Row Name 12/11/23 1522       Plan    Plan Return home alone. Meds to Bed    Plan Comments Barriers: NJtube with feedings. Full liquid diet. Continued abdominal pain.  At discharge Mr. Manley will return home. CM contacted [facility] and they cannot accept patient with a NJT at home due to risk of dislodgement                        Phone communication or documentation only - no physical contact with patient or family.   Lorena DUGAN,RN Case Manager  [facility]  Phone: Desk- [phone] cell- [phone]

## 2023-12-11 NOTE — PROGRESS NOTES
" LOS: 10 days   Patient Care Team:  Nancy El APRN as PCP - General (Family Medicine)      Subjective \"I am ok\"    Interval History:     Subjective: Tolerating tube feeds.  He does report some increased distention and discomfort with clear liquids.  No vomiting.    ROS:   No chest pain, shortness of breath, or cough.      Medication Review:     Current Facility-Administered Medications:     sennosides-docusate (PERICOLACE) 8.6-50 MG per tablet 2 tablet, 2 tablet, Oral, BID, 2 tablet at 12/11/23 0904 **AND** polyethylene glycol (MIRALAX) packet 17 g, 17 g, Oral, Daily, 17 g at 12/11/23 0904 **AND** bisacodyl (DULCOLAX) EC tablet 5 mg, 5 mg, Oral, Daily PRN **AND** bisacodyl (DULCOLAX) suppository 10 mg, 10 mg, Rectal, Daily PRN, Vargas Sotelo DO    dextrose (D50W) (25 g/50 mL) IV injection 25 g, 25 g, Intravenous, Q15 Min PRN, Vargas Sotelo DO, 25 g at 12/08/23 1850    dextrose (GLUTOSE) oral gel 15 g, 15 g, Oral, Q15 Min PRN, Vargas Sotelo DO    dextrose 5 % and sodium chloride 0.45 % infusion, 150 mL/hr, Intravenous, Continuous, Luan Mendoza MD, Last Rate: 150 mL/hr at 12/11/23 0800, 150 mL/hr at 12/11/23 0800    Enoxaparin Sodium (LOVENOX) syringe 40 mg, 40 mg, Subcutaneous, Q24H, Maile Purvis MD, 40 mg at 12/10/23 1501    glucagon (GLUCAGEN) injection 1 mg, 1 mg, Subcutaneous, Q15 Min PRN, Vargas Sotelo DO    HYDROmorphone (DILAUDID) injection 0.5 mg, 0.5 mg, Intravenous, Q4H PRN, Vargas Sotelo DO, 0.5 mg at 12/11/23 0915    ondansetron (ZOFRAN) injection 4 mg, 4 mg, Intravenous, Q6H PRN, Maile Purvis MD    oxyCODONE (ROXICODONE) immediate release tablet 20 mg, 20 mg, Oral, Q4H PRN, Vargas Sotelo, DO, 20 mg at 12/11/23 0801    pancrelipase (Lip-Prot-Amyl) (CREON) capsule 12,000 units of lipase, 12,000 units of lipase, Oral, TID With Meals, Noemí Espinoza, APRN, 12,000 units of lipase at 12/11/23 0801    prochlorperazine (COMPAZINE) injection 5 mg, 5 mg, Intravenous, Q6H PRN, " Maile Purvis MD    sodium chloride 0.9 % flush 10 mL, 10 mL, Intravenous, PRN, Sarabjit Harris PA    sodium chloride 0.9 % flush 10 mL, 10 mL, Intravenous, Q12H, Maile Purvis MD, 10 mL at 12/10/23 0913    sodium chloride 0.9 % flush 10 mL, 10 mL, Intravenous, PRN, Maile Purvis MD, 10 mL at 12/11/23 0430    sodium chloride 0.9 % infusion 40 mL, 40 mL, Intravenous, PRN, Maile Purvis MD, 100 mL/hr at 12/08/23 1511      Objective resting in bed, no acute distress, no family present    Vital Signs  Vitals:    12/11/23 0204 12/11/23 0500 12/11/23 0602 12/11/23 1029   BP: 118/82  129/87 108/66   BP Location: Right arm  Right arm Right arm   Patient Position: Lying  Lying Lying   Pulse: 70 72 74 70   Resp: 18  18 16   Temp: 98 °F (36.7 °C)  98.3 °F (36.8 °C) 98.8 °F (37.1 °C)   TempSrc: Oral  Oral Oral   SpO2: 97% 99% 99% 97%   Weight:  60.1 kg (132 lb 7.9 oz)     Height:           Physical Exam:     General Appearance:    Awake and alert, in no acute distress   Head:    Normocephalic, without obvious abnormality   Eyes:          Conjunctivae normal, anicteric sclera   Throat:   No oral lesions, no thrush, oral mucosa moist, Dobbhoff with tube feeds at 70 ml/hr   Neck:   supple, no JVD   Lungs:     respirations regular, even and unlabored       Rectal:     Deferred   Extremities:   No edema, no cyanosis   Skin:   No bruising or rash, no jaundice        Results Review:    CBC    Results from last 7 days   Lab Units 12/11/23  0414 12/10/23  0632 12/09/23  0355 12/08/23  0351 12/07/23  0418 12/06/23  0312 12/05/23  0535   WBC 10*3/mm3 4.10 4.80 4.80 3.50 3.10* 4.10 6.40   HEMOGLOBIN g/dL 11.2* 11.9* 11.3* 12.4* 13.1 11.7* 14.0   PLATELETS 10*3/mm3 367 409 433 403 391 292 373     CMP   Results from last 7 days   Lab Units 12/11/23  0414 12/10/23  1151 12/10/23  0632 12/09/23  0355 12/08/23  0351 12/07/23  0418 12/06/23  0312 12/05/23  0535   SODIUM mmol/L 140  --  141 138 137 139 137 134*   POTASSIUM mmol/L  4.2  --  3.9 3.8 4.0 3.9 3.7 4.1   CHLORIDE mmol/L 106  --  106 102 99 99 98 94*   CO2 mmol/L 25.0  --  28.0 26.0 24.0 30.0* 28.0 27.0   BUN mg/dL 6  --  3* 2* 2* 3* 5* 3*   CREATININE mg/dL 0.65*  --  0.76 0.77 0.69* 0.81 0.87 0.87   GLUCOSE mg/dL 111*  --  102* 112* 101* 104* 83 89   ALBUMIN g/dL 3.2*  --  3.2* 3.3* 3.1* 3.5 3.1* 3.2*   BILIRUBIN mg/dL 0.3  --  0.3 0.3 0.3 0.4 0.4 0.8   ALK PHOS U/L 90  --  102 100 89 96 85 99   AST (SGOT) U/L 11  --  20 15 16 22 13 13   ALT (SGPT) U/L 11  --  19 14 18 20 13 14   MAGNESIUM mg/dL  --   --  2.3  --   --   --  1.9  --    PHOSPHORUS mg/dL  --   --  2.9 3.1  --   --  3.7  --    AMYLASE U/L  --   --   --   --   --  487* 558*  --    LIPASE U/L 1,232* 1,359*  --  1,195* 1,088* 1,136* 1,099* 748*     Cr Clearance Estimated Creatinine Clearance: 125.9 mL/min (A) (by C-G formula based on SCr of 0.65 mg/dL (L)).  Coag   Results from last 7 days   Lab Units 12/06/23  0312   INR  1.15*     HbA1C   Lab Results   Component Value Date    HGBA1C 5.10 11/30/2023     Blood Glucose   Glucose   Date/Time Value Ref Range Status   12/11/2023 0709 106 (H) 70 - 105 mg/dL Final     Comment:     Serial Number: [id number]:  [id number]   12/11/2023 0010 111 (H) 70 - 105 mg/dL Final     Comment:     Serial Number: [id number]:  [id number]   12/10/2023 1812 84 70 - 105 mg/dL Final     Comment:     Serial Number: [id number]:  [id number]   12/10/2023 1202 103 70 - 105 mg/dL Final     Comment:     Serial Number: [id number]:  [id number]   12/10/2023 0607 105 70 - 105 mg/dL Final     Comment:     Serial Number: [id number]:  [id number]   12/10/2023 0114 103 70 - 105 mg/dL Final     Comment:     Serial Number: [id number]:  [id number]   12/09/2023 1803 109 (H) 70 - 105 mg/dL Final     Comment:     Serial Number: [id number]:  [id number]   12/09/2023 1201 120 (H) 70 - 105 mg/dL Final     Comment:     Serial Number: [id number]:  [id number]     Infection     UA       Radiology(recent) No radiology results for the last day       Assessment & Plan   Acute pancreatitis -alcohol induced  Abnormal CT showing pancreatitis with pseudocysts  Elevated LFTs -normalized  Alcohol abuse  Abnormal drug screen -positive cocaine and opiates  Mild normocytic anemia     12/8/2023 EGD (Dr. Mendoza) successful NJ tube placement.  Retained fluid in the stomach with minimal changes of gastritis.     PLAN:  Readmitted with abdominal pain.  No alcohol use since previous hospitalization but continues to smoke.  Ultrasound without cholelithiasis with CBD 10 mm.  Triglycerides normal.  Imaging suggesting developing pseudocyst.  Continue tube feeds and advance diet as tolerated.  If able to tolerate full liquids, can discontinue the Dobbhoff tube but if not can consider discharge home with Dobbhoff feeds.  Continue PPI and Creon.  Complete tobacco and alcohol cessation strongly recommended.  Oral analgesic regimen.  We will follow.    Electronically signed by TARAN Hugo, 12/11/23, 10:49 AM EST.

## 2023-12-11 NOTE — PROGRESS NOTES
"Nutrition Services    Patient Name: Dayron Manley  YOB: 1980  MRN: [id number]  Admission date: 11/30/2023    PPE Documentation        PPE Worn By Provider Did not enter room for this encounter   PPE Worn By Patient  N/A     PROGRESS NOTE      Encounter Information: Check in for tube feeding and PO intake. Diet advanced to full liquids 12/11.        PO Diet: Diet: Liquid Diets; Full Liquid; Fluid Consistency: Thin (IDDSI 0)   PO Supplements: Boost Plus TID (provides 1080 kcals, 42 g protein if consumed)      PO Intake:  Minimal intake with nature of frequent liquid diets and NPO status this admission        Current nutrition support: Isosource 1.5 at 70 mL/hr + 10 mL q2 water flush    Nutrition support review: Infusing as ordered per documentation        Labs (reviewed below): Reviewed, management per attending         GI Function:  BM 12/5 (x 6 days ago) - bowel regimen noted   Residuals N/A related to J-tube        Nutrition Intervention Updates: Continue current EN prescription, at goal. Diet advancement per GI. Monitor for diet tolerance. Encourage good PO intake.       Results from last 7 days   Lab Units 12/11/23  0414 12/10/23  0632 12/09/23  0355   SODIUM mmol/L 140 141 138   POTASSIUM mmol/L 4.2 3.9 3.8   CHLORIDE mmol/L 106 106 102   CO2 mmol/L 25.0 28.0 26.0   BUN mg/dL 6 3* 2*   CREATININE mg/dL 0.65* 0.76 0.77   CALCIUM mg/dL 8.8 8.5* 8.5*   BILIRUBIN mg/dL 0.3 0.3 0.3   ALK PHOS U/L 90 102 100   ALT (SGPT) U/L 11 19 14   AST (SGOT) U/L 11 20 15   GLUCOSE mg/dL 111* 102* 112*     Results from last 7 days   Lab Units 12/11/23  0414 12/10/23  0632 12/07/23  0418 12/06/23  0312   MAGNESIUM mg/dL  --  2.3  --  1.9   PHOSPHORUS mg/dL  --  2.9   < > 3.7   HEMOGLOBIN g/dL 11.2* 11.9*   < > 11.7*   HEMATOCRIT % 33.7* 36.2*   < > 35.7*    < > = values in this interval not displayed.     No results found for: \"COVID19\"  Lab Results   Component Value Date    HGBA1C 5.10 11/30/2023     RD to " follow up per protocol.    Electronically signed by:  Asya Zavala RD  12/11/23 10:03 EST

## 2023-12-11 NOTE — PLAN OF CARE
Goal Outcome Evaluation:      Pt made aware of discontinuation of hydromorphone for breakthrough pain per Dr. Quintero. Pt educated on the issue of narcotic dependency and effects of opoid use on the body over time by Elza JOHNS. Pt verbalized understanding and is aware of the new medication regimen. Continued care and awaiting new imaging estimated 12/12-12/13.

## 2023-12-11 NOTE — PROGRESS NOTES
[facility] MEDICINE SERVICE  DAILY PROGRESS NOTE    Patient Name: Dayron Manley  : 1980  MRN: [id number]  Primary Care Physician:  Nancy El APRN  Date of admission: 2023  Date of service: 23      Subjective      Chief Complaint: Abdominal pain       History of Present Illness: Dayron Manley is a 43 y.o. male with no significant past medical history who presented to [facility] on 2023 with abdominal pain. Per patient, he has had abdominal pain for the past few days.  Abdominal pain is located in the epigastric area, and rated as a 10/10 on a pain scale.  Patient also reported nausea and vomiting.  Endorses decreased appetite.  Of note, patient was recently admitted  and discharged on  after being treated for acute pancreatitis.  Patient stated that that was his first time being admitted for pancreatitis. Pt used to be a heavy drinker a decade ago. He quit drinking excessively about 10 years ago and only drinks rarely on occasions with the last drink being on his birthday, .  Denies any fever or chills, shortness of breath, chest pain, weakness or any recent sick contact.     In the ED, vital signs were stable.  Labs remarkable for creatinine 1.85, lipase 2138, WBC 14.3, urinalysis positive for nitrates, leukocytes and proteins.  Alcohol level was negative.  CT abdomen shows acute pancreatitis with new fluid collection seen within the pancreatic neck and body likely representing pseudocyst.     12/10/23-Patient Reports patient is feeling better.  Abdomen is resolved.  He has an NG tube in place.  No nausea vomiting.  23 patient seen and examined in bed no acute distress, vital signs stable, tolerating nasojejunal tube feeds.  Tolerating full liquid diet today. Per GI If tolerated we will discontinue the Dobbhoff tube and plan discharge home on Tuesday.     ROS A 12 point review of system was done and was negative except as  mentioned above      Objective      Vitals:   Temp:  [97.7 °F (36.5 °C)-98.8 °F (37.1 °C)] 98.7 °F (37.1 °C)  Heart Rate:  [70-79] 70  Resp:  [16-18] 16  BP: (101-129)/(66-87) 101/67    Physical Exam  Constitutional:       Appearance: Normal appearance.   HENT:      Head: Normocephalic and atraumatic.      Nose: Nose normal.   Cardiovascular:      Rate and Rhythm: Normal rate.      Heart sounds: Normal heart sounds.   Pulmonary:      Effort: Pulmonary effort is normal. No respiratory distress.      Breath sounds: Normal breath sounds. No stridor. No wheezing, rhonchi or rales.   Chest:      Chest wall: No tenderness.   Abdominal:      General: Abdomen is flat. There is no distension.      Palpations: Abdomen is soft. There is no mass.      Tenderness: There is no abdominal tenderness. There is no right CVA tenderness, left CVA tenderness, guarding or rebound.      Comments: NG tube in place   Musculoskeletal:      Cervical back: Normal range of motion.   Skin:     General: Skin is warm and dry.   Neurological:      General: No focal deficit present.      Mental Status: He is alert.   Psychiatric:         Mood and Affect: Mood normal.         Behavior: Behavior normal.             Result Review    Result Review:  I have personally reviewed the results from the time of this admission to 12/11/2023 15:15 EST and agree with these findings:  [x]  Laboratory  []  Microbiology  [x]  Radiology  []  EKG/Telemetry   []  Cardiology/Vascular   []  Pathology  []  Old records  []  Other:    CMP:        Lab 12/11/23  0414 12/10/23  1151 12/10/23  0632 12/09/23  0355 12/08/23  0351 12/07/23  0418 12/06/23  0312   SODIUM 140  --  141 138 137 139 137   POTASSIUM 4.2  --  3.9 3.8 4.0 3.9 3.7   CHLORIDE 106  --  106 102 99 99 98   CO2 25.0  --  28.0 26.0 24.0 30.0* 28.0   ANION GAP 9.0  --  7.0 10.0 14.0 10.0 11.0   BUN 6  --  3* 2* 2* 3* 5*   CREATININE 0.65*  --  0.76 0.77 0.69* 0.81 0.87   EGFR 120.7  --  115.1 114.6 118.5 112.9  110.5   GLUCOSE 111*  --  102* 112* 101* 104* 83   CALCIUM 8.8  --  8.5* 8.5* 9.0 9.4 8.7   MAGNESIUM  --   --  2.3  --   --   --  1.9   PHOSPHORUS  --   --  2.9 3.1  --   --  3.7   TOTAL PROTEIN 5.8*  --  5.8* 6.0 6.3 6.6 5.9*   ALBUMIN 3.2*  --  3.2* 3.3* 3.1* 3.5 3.1*   GLOBULIN 2.6  --  2.6 2.7 3.2 3.1 2.8   ALT (SGPT) 11  --  19 14 18 20 13   AST (SGOT) 11  --  20 15 16 22 13   BILIRUBIN 0.3  --  0.3 0.3 0.3 0.4 0.4   ALK PHOS 90  --  102 100 89 96 85   AMYLASE  --   --   --   --   --  487* 558*   LIPASE 1,232* 1,359*  --  1,195* 1,088* 1,136* 1,099*      .cbc  CBC:      Lab 12/11/23  0414 12/10/23  0632 12/09/23  0355 12/08/23  0351 12/07/23  0418   WBC 4.10 4.80 4.80 3.50 3.10*   HEMOGLOBIN 11.2* 11.9* 11.3* 12.4* 13.1   HEMATOCRIT 33.7* 36.2* 34.7* 37.4* 40.1   PLATELETS 367 409 433 403 391   NEUTROS ABS 2.50 3.10 3.20 2.00 1.80   LYMPHS ABS 1.00 1.00 1.00 1.00 0.90   MONOS ABS 0.30 0.40 0.40 0.30 0.30   EOS ABS 0.20 0.20 0.20 0.20 0.10   MCV 82.6 84.6 82.7 84.3 83.2        Assessment & Plan      Brief Patient Summary:  Dayron Manley is a 43 y.o. male with no significant past medical history who presented to [facility] on 11/30/2023 with abdominal pain. Per patient, he has had abdominal pain for the past few days.  Abdominal pain is located in the epigastric area, and rated as a 10/10 on a pain scale..  Patient was recently admitted 11/19 and discharged on 11/24 after being treated for acute pancreatitis.   In the ED, vital signs were stable.  Labs remarkable for creatinine 1.85, lipase 2138, WBC 14.3, urinalysis positive for nitrates, leukocytes and proteins.  Alcohol level was negative.  CT abdomen shows acute pancreatitis with new fluid collection seen within the pancreatic neck and body likely representing pseudocyst.      enoxaparin, 40 mg, Subcutaneous, Q24H  pancrelipase (Lip-Prot-Amyl), 12,000 units of lipase, Oral, TID With Meals  senna-docusate sodium, 2 tablet, Oral, BID    And  polyethylene glycol, 17 g, Oral, Daily  sodium chloride, 10 mL, Intravenous, Q12H       dextrose 5 % and sodium chloride 0.45 %, 150 mL/hr, Last Rate: 150 mL/hr (12/11/23 7174)         Active Hospital Problems:  Active Hospital Problems    Diagnosis     **Acute pancreatitis      Acute pancreatitis - improving   Pancreatic pseudocyst  Abdominal pain  -Significantly elevated lipase  - CT showing acute pancreatitis with fluid collection within the pancreatic neck likely representing pseudocyst.  Recommend free fluid in the pelvis related to pancreatitis and tracking inflammatory changes.  Bowels not dilated  His abdominal pain is better.  He is status post NG tube placement.  ESOPHAGOGASTRODUODENOSCOPY with NJ tube placement.  He is tolerating G-tube feeds.  GI to, decide about p.o. feeds.  IV fluids, continue IV analgesics  Follow-up electrolytes  -Tobacco and alcohol abstinence.  -per GI>We will continue nasojejunal tube feeds.  Will start full liquid diet today.  If tolerated we will discontinue the Dobbhoff tube and plan discharge home on Tuesday.  Continue PPI and Creon.       SINGH from volume depletion- resolved   -Continue IV fluid hydration  -Follow BMP    Constipation -   senna-s two tablets P.O BID and miralax 17g P.O Qday.       DVT prophylaxis:  Medical DVT prophylaxis orders are present.    CODE STATUS:    Code Status (Patient has no pulse and is not breathing): CPR (Attempt to Resuscitate)  Medical Interventions (Patient has pulse or is breathing): Full Support      Disposition:  I expect patient to be discharged 1 to 2 days    I discussed the patient's findings and my recommendations with the patient.    Electronically signed by Fabian Stratton MD, 12/11/23, 3:21 PM EST.    [facility] Hospitalist Team

## 2023-12-11 NOTE — PLAN OF CARE
Problem: Aspiration (Enteral Nutrition)  Goal: Absence of Aspiration Signs and Symptoms  Outcome: Ongoing, Progressing  Intervention: Minimize Aspiration Risk  Recent Flowsheet Documentation  Taken 12/10/2023 2013 by Pearl Pearson, RN  Head of Bed (HOB) Positioning: HOB at 30-45 degrees     Problem: Device-Related Complication Risk (Enteral Nutrition)  Goal: Safe, Effective Therapy Delivery  Outcome: Ongoing, Progressing     Problem: Pain Acute  Goal: Acceptable Pain Control and Functional Ability  Outcome: Ongoing, Progressing  Intervention: Prevent or Manage Pain  Recent Flowsheet Documentation  Taken 12/10/2023 2013 by Pearl Pearson, RN  Sensory Stimulation Regulation:   lighting decreased   quiet environment promoted   television on  Medication Review/Management: medications reviewed  Intervention: Develop Pain Management Plan  Recent Flowsheet Documentation  Taken 12/10/2023 2013 by Pearl Pearson, RN  Pain Management Interventions: see MAR  Taken 12/10/2023 1943 by Pearl Pearson, RN  Pain Management Interventions: see MAR  Intervention: Optimize Psychosocial Wellbeing  Recent Flowsheet Documentation  Taken 12/10/2023 2013 by Pearl Pearson, RN  Supportive Measures:   self-care encouraged   active listening utilized  Diversional Activities:   television   smartphone   Goal Outcome Evaluation:    Pt continues to have pain ranging from 8/10 to 10/10. Tolerating continuous tube feeds. Resting comfortably. Will continue to monitor.

## 2023-12-12 ENCOUNTER — INPATIENT HOSPITAL (OUTPATIENT)
Dept: URBAN - METROPOLITAN AREA HOSPITAL 84 | Facility: HOSPITAL | Age: 43
End: 2023-12-12
Payer: COMMERCIAL

## 2023-12-12 DIAGNOSIS — K85.90 ACUTE PANCREATITIS WITHOUT NECROSIS OR INFECTION, UNSPECIFIE: ICD-10-CM

## 2023-12-12 LAB
ALBUMIN SERPL-MCNC: 3.7 G/DL (ref 3.5–5.2)
ALBUMIN/GLOB SERPL: 1.3 G/DL
ALP SERPL-CCNC: 104 U/L (ref 39–117)
ALT SERPL W P-5'-P-CCNC: 13 U/L (ref 1–41)
ANION GAP SERPL CALCULATED.3IONS-SCNC: 8 MMOL/L (ref 5–15)
AST SERPL-CCNC: 14 U/L (ref 1–40)
BASOPHILS # BLD AUTO: 0 10*3/MM3 (ref 0–0.2)
BASOPHILS NFR BLD AUTO: 0.7 % (ref 0–1.5)
BILIRUB SERPL-MCNC: 0.3 MG/DL (ref 0–1.2)
BUN SERPL-MCNC: 10 MG/DL (ref 6–20)
BUN/CREAT SERPL: 13 (ref 7–25)
CALCIUM SPEC-SCNC: 9.4 MG/DL (ref 8.6–10.5)
CHLORIDE SERPL-SCNC: 102 MMOL/L (ref 98–107)
CO2 SERPL-SCNC: 29 MMOL/L (ref 22–29)
CREAT SERPL-MCNC: 0.77 MG/DL (ref 0.76–1.27)
CRP SERPL-MCNC: 2.39 MG/DL (ref 0–0.5)
DEPRECATED RDW RBC AUTO: 38.5 FL (ref 37–54)
EGFRCR SERPLBLD CKD-EPI 2021: 114.6 ML/MIN/1.73
EOSINOPHIL # BLD AUTO: 0.3 10*3/MM3 (ref 0–0.4)
EOSINOPHIL NFR BLD AUTO: 6.7 % (ref 0.3–6.2)
ERYTHROCYTE [DISTWIDTH] IN BLOOD BY AUTOMATED COUNT: 12.9 % (ref 12.3–15.4)
GLOBULIN UR ELPH-MCNC: 2.9 GM/DL
GLUCOSE BLDC GLUCOMTR-MCNC: 83 MG/DL (ref 70–105)
GLUCOSE BLDC GLUCOMTR-MCNC: 84 MG/DL (ref 70–105)
GLUCOSE BLDC GLUCOMTR-MCNC: 86 MG/DL (ref 70–105)
GLUCOSE SERPL-MCNC: 89 MG/DL (ref 65–99)
HCT VFR BLD AUTO: 35.8 % (ref 37.5–51)
HGB BLD-MCNC: 12 G/DL (ref 13–17.7)
LIPASE SERPL-CCNC: 1379 U/L (ref 13–60)
LYMPHOCYTES # BLD AUTO: 1 10*3/MM3 (ref 0.7–3.1)
LYMPHOCYTES NFR BLD AUTO: 25.5 % (ref 19.6–45.3)
MCH RBC QN AUTO: 28.3 PG (ref 26.6–33)
MCHC RBC AUTO-ENTMCNC: 33.6 G/DL (ref 31.5–35.7)
MCV RBC AUTO: 84.4 FL (ref 79–97)
MONOCYTES # BLD AUTO: 0.4 10*3/MM3 (ref 0.1–0.9)
MONOCYTES NFR BLD AUTO: 9.7 % (ref 5–12)
NEUTROPHILS NFR BLD AUTO: 2.2 10*3/MM3 (ref 1.7–7)
NEUTROPHILS NFR BLD AUTO: 57.4 % (ref 42.7–76)
NRBC BLD AUTO-RTO: 0.1 /100 WBC (ref 0–0.2)
PLATELET # BLD AUTO: 414 10*3/MM3 (ref 140–450)
PMV BLD AUTO: 8.5 FL (ref 6–12)
POTASSIUM SERPL-SCNC: 4 MMOL/L (ref 3.5–5.2)
PROT SERPL-MCNC: 6.6 G/DL (ref 6–8.5)
RBC # BLD AUTO: 4.24 10*6/MM3 (ref 4.14–5.8)
SODIUM SERPL-SCNC: 139 MMOL/L (ref 136–145)
WBC NRBC COR # BLD AUTO: 3.8 10*3/MM3 (ref 3.4–10.8)

## 2023-12-12 PROCEDURE — 99232 SBSQ HOSP IP/OBS MODERATE 35: CPT | Performed by: NURSE PRACTITIONER

## 2023-12-12 PROCEDURE — 86140 C-REACTIVE PROTEIN: CPT | Performed by: NURSE PRACTITIONER

## 2023-12-12 PROCEDURE — 85025 COMPLETE CBC W/AUTO DIFF WBC: CPT | Performed by: STUDENT IN AN ORGANIZED HEALTH CARE EDUCATION/TRAINING PROGRAM

## 2023-12-12 PROCEDURE — 80053 COMPREHEN METABOLIC PANEL: CPT | Performed by: STUDENT IN AN ORGANIZED HEALTH CARE EDUCATION/TRAINING PROGRAM

## 2023-12-12 PROCEDURE — 82948 REAGENT STRIP/BLOOD GLUCOSE: CPT

## 2023-12-12 PROCEDURE — 83690 ASSAY OF LIPASE: CPT | Performed by: NURSE PRACTITIONER

## 2023-12-12 PROCEDURE — 25010000002 ENOXAPARIN PER 10 MG: Performed by: HOSPITALIST

## 2023-12-12 RX ADMIN — DEXTROSE AND SODIUM CHLORIDE 100 ML/HR: 5; 450 INJECTION, SOLUTION INTRAVENOUS at 20:23

## 2023-12-12 RX ADMIN — PANCRELIPASE 12000 UNITS OF LIPASE: 60000; 12000; 38000 CAPSULE, DELAYED RELEASE PELLETS ORAL at 08:54

## 2023-12-12 RX ADMIN — OXYCODONE HYDROCHLORIDE 20 MG: 5 TABLET ORAL at 00:27

## 2023-12-12 RX ADMIN — DEXTROSE AND SODIUM CHLORIDE 150 ML/HR: 5; 450 INJECTION, SOLUTION INTRAVENOUS at 03:51

## 2023-12-12 RX ADMIN — OXYCODONE HYDROCHLORIDE 20 MG: 5 TABLET ORAL at 22:37

## 2023-12-12 RX ADMIN — OXYCODONE HYDROCHLORIDE 20 MG: 5 TABLET ORAL at 18:33

## 2023-12-12 RX ADMIN — DOCUSATE SODIUM 50 MG AND SENNOSIDES 8.6 MG 2 TABLET: 8.6; 5 TABLET, FILM COATED ORAL at 08:54

## 2023-12-12 RX ADMIN — Medication 10 ML: at 08:54

## 2023-12-12 RX ADMIN — OXYCODONE HYDROCHLORIDE 20 MG: 5 TABLET ORAL at 04:33

## 2023-12-12 RX ADMIN — OXYCODONE HYDROCHLORIDE 20 MG: 5 TABLET ORAL at 08:54

## 2023-12-12 RX ADMIN — PANCRELIPASE 12000 UNITS OF LIPASE: 60000; 12000; 38000 CAPSULE, DELAYED RELEASE PELLETS ORAL at 14:24

## 2023-12-12 RX ADMIN — ENOXAPARIN SODIUM 40 MG: 100 INJECTION SUBCUTANEOUS at 17:00

## 2023-12-12 RX ADMIN — POLYETHYLENE GLYCOL 3350 17 G: 17 POWDER, FOR SOLUTION ORAL at 08:54

## 2023-12-12 RX ADMIN — OXYCODONE HYDROCHLORIDE 20 MG: 5 TABLET ORAL at 14:24

## 2023-12-12 RX ADMIN — DOCUSATE SODIUM 50 MG AND SENNOSIDES 8.6 MG 2 TABLET: 8.6; 5 TABLET, FILM COATED ORAL at 20:23

## 2023-12-12 RX ADMIN — PANCRELIPASE 12000 UNITS OF LIPASE: 60000; 12000; 38000 CAPSULE, DELAYED RELEASE PELLETS ORAL at 18:33

## 2023-12-12 NOTE — CASE MANAGEMENT/SOCIAL WORK
Continued Stay Note  [facility]     Patient Name: Dayron Manley  MRN: [id number]  Today's Date: 12/12/2023    Admit Date: 11/30/2023    Plan: Return home alone. Meds to Bed   Discharge Plan       Row Name 12/12/23 1141       Plan    Plan Comments Barriers: NJ tube with feedings. Full liquid diet. CM contacted [facility], [facility], [facility] and [facility] and inquired about accepting for management of his NJtube at home and all denied because of risk of dislodgement. CM informed Mr. Manley, nursing and Dr. Stratton. Mr. Manley hopes to advance his diet                    Maintained distance greater than six feet and spent less than 15 minutes in the room.     Lorena DUGAN,RN Case Manager  [facility]  Phone: Desk- [phone] cell- [phone]

## 2023-12-12 NOTE — DISCHARGE PLACEMENT REQUEST
"Dayron Neville (43 y.o. Male)       Date of Birth   1980    Social Security Number       Address   [address] [place] [address]    Home Phone   [phone]    MRN   [id number]       Shinto   Shinto    Marital Status   Single                            Admission Date   11/30/23    Admission Type   Emergency    Admitting Provider   Maile Purvis MD    Attending Provider   Fabian Stratton MD    Department, Room/Bed   [facility] OBSERVATION, 229/1       Discharge Date       Discharge Disposition       Discharge Destination                                 Attending Provider: Fabian Stratton MD    Allergies: No Known Allergies    Isolation: None   Infection: None   Code Status: CPR    Ht: 182.9 cm (72.01\")   Wt: 57.7 kg (127 lb 3.3 oz)    Admission Cmt: None   Principal Problem: Acute pancreatitis [K85.90]                   Active Insurance as of 11/30/2023       Primary Coverage       Payor Plan Insurance Group Employer/Plan Group    HUMANA HUMANA [id number]       Payor Plan Address Payor Plan Phone Number Payor Plan Fax Number Effective Dates    [address] [address] [phone]  10/22/2023 - None Entered    [facility] [id number]         Subscriber Name Subscriber Birth Date Member ID       DAYRON NEVILLE 1980 [id number]                     Emergency Contacts        (Rel.) Home Phone Work Phone Mobile Phone    NANCY JOEL (Mother) -- -- [phone]              Insurance Information                  HUMANA/HUMANA Phone: [phone]    Subscriber: Dayron Neville Subscriber#: [id number]    Group#: [id number] Precert#: --          "

## 2023-12-12 NOTE — PROGRESS NOTES
". LOS: 11 days   Patient Care Team:  Nancy El APRN as PCP - General (Family Medicine)      Subjective \"about the same\"    Subjective: Patient did well overnight and has no new GI complaints today.  He was able to tolerate eating a yogurt and some chicken broth yesterday with no new nausea/vomiting or increase in abdominal pain.  He is passing gas but has not had any bowel movements.      ROS:   No chest pain, shortness of breath, or cough.        Medication Review:     Current Facility-Administered Medications:     sennosides-docusate (PERICOLACE) 8.6-50 MG per tablet 2 tablet, 2 tablet, Oral, BID, 2 tablet at 12/12/23 0854 **AND** polyethylene glycol (MIRALAX) packet 17 g, 17 g, Oral, Daily, 17 g at 12/12/23 0854 **AND** bisacodyl (DULCOLAX) EC tablet 5 mg, 5 mg, Oral, Daily PRN **AND** bisacodyl (DULCOLAX) suppository 10 mg, 10 mg, Rectal, Daily PRN, Vargas Sotelo DO    dextrose (D50W) (25 g/50 mL) IV injection 25 g, 25 g, Intravenous, Q15 Min PRN, Vargas Sotelo DO, 25 g at 12/08/23 1850    dextrose (GLUTOSE) oral gel 15 g, 15 g, Oral, Q15 Min PRN, Vargas Sotelo DO    dextrose 5 % and sodium chloride 0.45 % infusion, 150 mL/hr, Intravenous, Continuous, Luan Mendoza MD, Last Rate: 150 mL/hr at 12/12/23 0351, 150 mL/hr at 12/12/23 0351    Enoxaparin Sodium (LOVENOX) syringe 40 mg, 40 mg, Subcutaneous, Q24H, Maile Purvis MD, 40 mg at 12/11/23 1624    glucagon (GLUCAGEN) injection 1 mg, 1 mg, Subcutaneous, Q15 Min PRN, Vargas Sotelo DO    oxyCODONE (ROXICODONE) immediate release tablet 20 mg, 20 mg, Oral, Q4H PRN, Vargas Sotelo DO, 20 mg at 12/12/23 0854    pancrelipase (Lip-Prot-Amyl) (CREON) capsule 12,000 units of lipase, 12,000 units of lipase, Oral, TID With Meals, Noemí Espinoza, APRN, 12,000 units of lipase at 12/12/23 0854    sodium chloride 0.9 % flush 10 mL, 10 mL, Intravenous, PRN, Sarabjit Harris PA    sodium chloride 0.9 % flush 10 mL, 10 mL, Intravenous, Q12H, Maile Purvis, " MD, 10 mL at 12/12/23 0854    sodium chloride 0.9 % flush 10 mL, 10 mL, Intravenous, PRN, Maile Purvis MD, 10 mL at 12/11/23 0430    sodium chloride 0.9 % infusion 40 mL, 40 mL, Intravenous, PRN, Maile Purvis MD, 100 mL/hr at 12/08/23 1511      Objective resting comfortably in bed.  No family at bedsid    Vital Signs  Vitals:    12/11/23 2248 12/12/23 0254 12/12/23 0552 12/12/23 1010   BP: 109/66 113/68 113/73 113/66   BP Location: Right arm Right arm Right arm Right arm   Patient Position: Lying Lying Lying Lying   Pulse: 74 76 75 74   Resp: 17 16 18 16   Temp: 98.4 °F (36.9 °C) 97.8 °F (36.6 °C) 98.4 °F (36.9 °C) 97.7 °F (36.5 °C)   TempSrc: Oral Oral Oral Oral   SpO2: 100% 95% 100% 97%   Weight:   57.7 kg (127 lb 3.3 oz)    Height:           Physical Exam:     General Appearance:    Awake and alert, in no acute distress   Head:    Normocephalic, without obvious abnormality   Eyes:          Conjunctivae normal, anicteric sclera   Throat:   No oral lesions, no thrush, oral mucosa moist, dobhoff intact with TF at 70 ml/hr   Neck:   supple, no JVD   Lungs:     respirations regular, even and unlabored   Abdomen:     Soft, mild upper abdominal tenderness, no rebound or guarding, non-distended   Rectal:     Deferred   Extremities:   No edema, no cyanosis   Skin:   No bruising or rash, no jaundice        Results Review:    CBC    Results from last 7 days   Lab Units 12/12/23  0523 12/11/23  0414 12/10/23  0632 12/09/23  0355 12/08/23  0351 12/07/23  0418 12/06/23  0312   WBC 10*3/mm3 3.80 4.10 4.80 4.80 3.50 3.10* 4.10   HEMOGLOBIN g/dL 12.0* 11.2* 11.9* 11.3* 12.4* 13.1 11.7*   PLATELETS 10*3/mm3 414 367 409 433 403 391 292     CMP   Results from last 7 days   Lab Units 12/12/23  0523 12/11/23  0414 12/10/23  1151 12/10/23  0632 12/09/23  0355 12/08/23  0351 12/07/23  0418 12/06/23  0312   SODIUM mmol/L 139 140  --  141 138 137 139 137   POTASSIUM mmol/L 4.0 4.2  --  3.9 3.8 4.0 3.9 3.7   CHLORIDE mmol/L 102  106  --  106 102 99 99 98   CO2 mmol/L 29.0 25.0  --  28.0 26.0 24.0 30.0* 28.0   BUN mg/dL 10 6  --  3* 2* 2* 3* 5*   CREATININE mg/dL 0.77 0.65*  --  0.76 0.77 0.69* 0.81 0.87   GLUCOSE mg/dL 89 111*  --  102* 112* 101* 104* 83   ALBUMIN g/dL 3.7 3.2*  --  3.2* 3.3* 3.1* 3.5 3.1*   BILIRUBIN mg/dL 0.3 0.3  --  0.3 0.3 0.3 0.4 0.4   ALK PHOS U/L 104 90  --  102 100 89 96 85   AST (SGOT) U/L 14 11  --  20 15 16 22 13   ALT (SGPT) U/L 13 11  --  19 14 18 20 13   MAGNESIUM mg/dL  --   --   --  2.3  --   --   --  1.9   PHOSPHORUS mg/dL  --   --   --  2.9 3.1  --   --  3.7   AMYLASE U/L  --   --   --   --   --   --  487* 558*   LIPASE U/L 1,379* 1,232* 1,359*  --  1,195* 1,088* 1,136* 1,099*     Cr Clearance Estimated Creatinine Clearance: 102 mL/min (by C-G formula based on SCr of 0.77 mg/dL).  Coag   Results from last 7 days   Lab Units 12/06/23  0312   INR  1.15*     HbA1C   Lab Results   Component Value Date    HGBA1C 5.10 11/30/2023     Blood Glucose   Glucose   Date/Time Value Ref Range Status   12/12/2023 0627 83 70 - 105 mg/dL Final     Comment:     Serial Number: [id number]:  [id number]   12/12/2023 0059 84 70 - 105 mg/dL Final     Comment:     Serial Number: [id number]:  [id number]   12/11/2023 2046 101 70 - 105 mg/dL Final     Comment:     Serial Number: [id number]:  [id number]   12/11/2023 1820 96 70 - 105 mg/dL Final     Comment:     Serial Number: [id number]:  [id number]   12/11/2023 1255 108 (H) 70 - 105 mg/dL Final     Comment:     Serial Number: [id number]:  [id number]   12/11/2023 0709 106 (H) 70 - 105 mg/dL Final     Comment:     Serial Number: [id number]:  [id number]   12/11/2023 0010 111 (H) 70 - 105 mg/dL Final     Comment:     Serial Number: [id number]:  [id number]   12/10/2023 1812 84 70 - 105 mg/dL Final     Comment:     Serial Number: [id number]:  [id number]     Infection     UA      Radiology(recent) No radiology results for the last day        Assessment & Plan   Acute pancreatitis -alcohol induced  Abnormal CT showing pancreatitis with pseudocysts  Elevated LFTs -normalized  Alcohol abuse  Abnormal drug screen -positive cocaine and opiates  Mild normocytic anemia     12/8/2023 EGD (Dr. Mendoza) successful NJ tube placement.  Retained fluid in the stomach with minimal changes of gastritis.     PLAN:  Readmitted with abdominal pain.  No alcohol use since previous hospitalization but continues to smoke.  Ultrasound without cholelithiasis with CBD 10 mm.  Triglycerides normal.  Imaging suggesting developing pseudocyst.  Tolerating tube feeds at 70 mL an hour.  Patient was able to eat some full liquid yesterday without any increase in pain or nausea/vomiting.  CRP 2.39, lipase 1379.  Continue PPI and Creon. Complete tobacco and alcohol cessation strongly recommended.  Oral analgesic regimen.  Patient agreeable to discharge home with NJ feeds.   notified and will consult home health.  Patient will likely need NJ feeds for 2 to 4 weeks.  Will need to follow-up outpatient. Ok to discharge home from GI standpoint once TF/home health established.  Continue pain management per primary.    Electronically signed by TARAN Hugo, 12/12/23, 10:51 AM EST.

## 2023-12-12 NOTE — PROGRESS NOTES
Nutrition Services    Patient Name: Dayron Manley  YOB: 1980  MRN: [id number]  Admission date: 11/30/2023    PPE Documentation        PPE Worn By Provider Did not enter room for this encounter   PPE Worn By Patient  N/A     PROGRESS NOTE      Encounter Information: Check in for tube feeding and PO intake. GI noted plan for pt to discharge home with NJ tube for feeds for 2-4 weeks. Pt tolerating goal TF at this time.       PO Diet: Diet: Liquid Diets; Full Liquid; Fluid Consistency: Thin (IDDSI 0)   PO Supplements: Boost Plus TID (provides 1080 kcals, 42 g protein if consumed)      PO Intake:  Minimal intake with nature of frequent liquid diets and NPO status this admission        Current nutrition support: Isosource 1.5 at 70 mL/hr + 10 mL q2 water flush    Nutrition support review: Infusing as ordered per documentation        Labs (reviewed below): Reviewed, management per attending         GI Function:  BM 12/5 (x 7 days ago) - bowel regimen noted, GI following  Residuals N/A related to J-tube        Nutrition Intervention Updates: Continue current EN prescription, at goal. Diet advancement per GI. Monitor for diet tolerance. Encourage good PO intake.       Results from last 7 days   Lab Units 12/12/23  0523 12/11/23  0414 12/10/23  0632   SODIUM mmol/L 139 140 141   POTASSIUM mmol/L 4.0 4.2 3.9   CHLORIDE mmol/L 102 106 106   CO2 mmol/L 29.0 25.0 28.0   BUN mg/dL 10 6 3*   CREATININE mg/dL 0.77 0.65* 0.76   CALCIUM mg/dL 9.4 8.8 8.5*   BILIRUBIN mg/dL 0.3 0.3 0.3   ALK PHOS U/L 104 90 102   ALT (SGPT) U/L 13 11 19   AST (SGOT) U/L 14 11 20   GLUCOSE mg/dL 89 111* 102*     Results from last 7 days   Lab Units 12/12/23  0523 12/11/23  0414 12/10/23  0632 12/07/23 0418 12/06/23  0312   MAGNESIUM mg/dL  --   --  2.3  --  1.9   PHOSPHORUS mg/dL  --   --  2.9   < > 3.7   HEMOGLOBIN g/dL 12.0*   < > 11.9*   < > 11.7*   HEMATOCRIT % 35.8*   < > 36.2*   < > 35.7*    < > = values in this interval not  "displayed.     No results found for: \"COVID19\"  Lab Results   Component Value Date    HGBA1C 5.10 11/30/2023     RD to follow up per protocol.    Electronically signed by:  Asya Zavala RD  12/12/23 11:22 EST    "

## 2023-12-12 NOTE — PLAN OF CARE
Problem: Adult Inpatient Plan of Care  Goal: Plan of Care Review  Outcome: Ongoing, Progressing  Flowsheets (Taken 12/12/2023 0133)  Progress: no change  Plan of Care Reviewed With: patient  Outcome Evaluation: Continuous tube feeding still running. Pain controlled with PRN medications. Will continue to monitor  Goal: Patient-Specific Goal (Individualized)  Outcome: Ongoing, Progressing  Goal: Absence of Hospital-Acquired Illness or Injury  Outcome: Ongoing, Progressing  Intervention: Identify and Manage Fall Risk  Recent Flowsheet Documentation  Taken 12/12/2023 0000 by Leida Hanson RN  Safety Promotion/Fall Prevention: safety round/check completed  Taken 12/11/2023 2200 by Leida Hanson RN  Safety Promotion/Fall Prevention: safety round/check completed  Taken 12/11/2023 2035 by Leida Hanson RN  Safety Promotion/Fall Prevention: safety round/check completed  Intervention: Prevent Skin Injury  Recent Flowsheet Documentation  Taken 12/12/2023 0000 by Leida Hanson RN  Body Position: position changed independently  Taken 12/11/2023 2035 by Leida Hanson RN  Body Position: position changed independently  Skin Protection:   adhesive use limited   tubing/devices free from skin contact  Intervention: Prevent Infection  Recent Flowsheet Documentation  Taken 12/12/2023 0000 by Leida Hanson RN  Infection Prevention:   hand hygiene promoted   rest/sleep promoted  Taken 12/11/2023 2035 by Leida Hanson RN  Infection Prevention:   hand hygiene promoted   rest/sleep promoted   single patient room provided  Goal: Optimal Comfort and Wellbeing  Outcome: Ongoing, Progressing  Intervention: Monitor Pain and Promote Comfort  Recent Flowsheet Documentation  Taken 12/11/2023 2035 by Leida Hanson RN  Pain Management Interventions: see MAR  Intervention: Provide Person-Centered Care  Recent Flowsheet Documentation  Taken 12/11/2023 2035 by Leida Hanson  RN  Trust Relationship/Rapport:   care explained   choices provided   questions answered   questions encouraged   reassurance provided   thoughts/feelings acknowledged  Goal: Readiness for Transition of Care  Outcome: Ongoing, Progressing     Problem: Pain Acute  Goal: Acceptable Pain Control and Functional Ability  Outcome: Ongoing, Progressing  Intervention: Prevent or Manage Pain  Recent Flowsheet Documentation  Taken 12/12/2023 0000 by Leida Hanson RN  Medication Review/Management: medications reviewed  Taken 12/11/2023 2035 by Leida Hanson RN  Sleep/Rest Enhancement: awakenings minimized  Medication Review/Management: medications reviewed  Intervention: Develop Pain Management Plan  Recent Flowsheet Documentation  Taken 12/11/2023 2035 by Leida Hanson RN  Pain Management Interventions: see MAR  Intervention: Optimize Psychosocial Wellbeing  Recent Flowsheet Documentation  Taken 12/11/2023 2035 by Leida Hanson RN  Supportive Measures: active listening utilized  Diversional Activities: television     Problem: Fall Injury Risk  Goal: Absence of Fall and Fall-Related Injury  Outcome: Ongoing, Progressing  Intervention: Identify and Manage Contributors  Recent Flowsheet Documentation  Taken 12/12/2023 0000 by Leida Hanson RN  Medication Review/Management: medications reviewed  Taken 12/11/2023 2035 by Leida Hanson RN  Medication Review/Management: medications reviewed  Intervention: Promote Injury-Free Environment  Recent Flowsheet Documentation  Taken 12/12/2023 0000 by Leida Hanson RN  Safety Promotion/Fall Prevention: safety round/check completed  Taken 12/11/2023 2200 by Leida Hanson RN  Safety Promotion/Fall Prevention: safety round/check completed  Taken 12/11/2023 2035 by Leida Hanson RN  Safety Promotion/Fall Prevention: safety round/check completed     Problem: Nausea and Vomiting  Goal: Fluid and Electrolyte Balance  Outcome:  Ongoing, Progressing  Intervention: Monitor and Manage Hypovolemia  Recent Flowsheet Documentation  Taken 12/11/2023 2035 by Leida Hanson RN  Fluid/Electrolyte Management: fluids provided  Intervention: Prevent and Manage Nausea and Vomiting  Recent Flowsheet Documentation  Taken 12/11/2023 2035 by Leida Hanson RN  Environmental Support: calm environment promoted     Problem: Aspiration (Enteral Nutrition)  Goal: Absence of Aspiration Signs and Symptoms  Outcome: Ongoing, Progressing  Intervention: Minimize Aspiration Risk  Recent Flowsheet Documentation  Taken 12/12/2023 0000 by Leida Hanson RN  Head of Bed (HOB) Positioning: HOB elevated  Taken 12/11/2023 2035 by Leida Hanson RN  Head of Bed (HOB) Positioning: HOB elevated     Problem: Device-Related Complication Risk (Enteral Nutrition)  Goal: Safe, Effective Therapy Delivery  Outcome: Ongoing, Progressing     Problem: Feeding Intolerance (Enteral Nutrition)  Goal: Feeding Tolerance  Outcome: Ongoing, Progressing   Goal Outcome Evaluation:  Plan of Care Reviewed With: patient        Progress: no change  Outcome Evaluation: Continuous tube feeding still running. Pain controlled with PRN medications. Will continue to monitor

## 2023-12-12 NOTE — PROGRESS NOTES
[facility] MEDICINE SERVICE  DAILY PROGRESS NOTE    Patient Name: Dayron Manley  : 1980  MRN: [id number]  Primary Care Physician:  Nancy El APRN  Date of admission: 2023  Date of service: 23      Subjective      Chief Complaint: Abdominal pain       History of Present Illness: Dayron Manley is a 43 y.o. male with no significant past medical history who presented to [facility] on 2023 with abdominal pain. Per patient, he has had abdominal pain for the past few days.  Abdominal pain is located in the epigastric area, and rated as a 10/10 on a pain scale.  Patient also reported nausea and vomiting.  Endorses decreased appetite.  Of note, patient was recently admitted  and discharged on  after being treated for acute pancreatitis.  Patient stated that that was his first time being admitted for pancreatitis. Pt used to be a heavy drinker a decade ago. He quit drinking excessively about 10 years ago and only drinks rarely on occasions with the last drink being on his birthday, .  Denies any fever or chills, shortness of breath, chest pain, weakness or any recent sick contact. On admission, vital signs were stable.  Labs remarkable for creatinine 1.85, lipase 2138, WBC 14.3, urinalysis positive for nitrates, leukocytes and proteins.  Alcohol level was negative.  CT abdomen shows acute pancreatitis with new fluid collection seen within the pancreatic neck and body likely representing pseudocyst.     23:    Patient seen and examined.  Lying in bed, appears comfortable.  Vital signs stable.  Reports continues to have mild abdominal pain.  Reports 5 out of 10.  Lipase enzymes remain mildly elevated but stable.  No further nausea or vomiting reported.  He is able to tolerate full liquid diet.  I encourage patient to increase oral intake.  NG tube still in place and tolerating tube feeds. He is passing gas but has not had any bowel  movements.     GI is following.    Patient is hemodynamically stable at the time my exam.    ROS A 12 point review of system was done and was negative except as mentioned above      Objective      Vitals:   Temp:  [97.7 °F (36.5 °C)-98.8 °F (37.1 °C)] 97.7 °F (36.5 °C)  Heart Rate:  [70-76] 74  Resp:  [15-18] 16  BP: (101-113)/(66-73) 113/66    Physical Exam  Constitutional:       Appearance: Normal appearance.   HENT:      Head: Normocephalic and atraumatic.      Nose: Nose normal.   Cardiovascular:      Rate and Rhythm: Normal rate.      Heart sounds: Normal heart sounds.   Pulmonary:      Effort: Pulmonary effort is normal. No respiratory distress.      Breath sounds: Normal breath sounds. No stridor. No wheezing, rhonchi or rales.   Chest:      Chest wall: No tenderness.   Abdominal:      General: Abdomen is flat. There is no distension.      Palpations: Abdomen is soft. There is no mass.      Tenderness: There is no abdominal tenderness. There is no right CVA tenderness, left CVA tenderness, guarding or rebound.      Comments: NG tube in place   Musculoskeletal:      Cervical back: Normal range of motion.   Skin:     General: Skin is warm and dry.   Neurological:      General: No focal deficit present.      Mental Status: He is alert.   Psychiatric:         Mood and Affect: Mood normal.         Behavior: Behavior normal.             Result Review    Result Review:  I have personally reviewed the results from the time of this admission to 12/12/2023 10:13 EST and agree with these findings:  [x]  Laboratory  []  Microbiology  [x]  Radiology  []  EKG/Telemetry   []  Cardiology/Vascular   []  Pathology  []  Old records  []  Other:    CMP:        Lab 12/12/23  0523 12/11/23  0414 12/10/23  1151 12/10/23  0632 12/09/23  0355 12/08/23  0351 12/07/23  0418 12/06/23  0312   SODIUM 139 140  --  141 138 137 139 137   POTASSIUM 4.0 4.2  --  3.9 3.8 4.0 3.9 3.7   CHLORIDE 102 106  --  106 102 99 99 98   CO2 29.0 25.0  --   28.0 26.0 24.0 30.0* 28.0   ANION GAP 8.0 9.0  --  7.0 10.0 14.0 10.0 11.0   BUN 10 6  --  3* 2* 2* 3* 5*   CREATININE 0.77 0.65*  --  0.76 0.77 0.69* 0.81 0.87   EGFR 114.6 120.7  --  115.1 114.6 118.5 112.9 110.5   GLUCOSE 89 111*  --  102* 112* 101* 104* 83   CALCIUM 9.4 8.8  --  8.5* 8.5* 9.0 9.4 8.7   MAGNESIUM  --   --   --  2.3  --   --   --  1.9   PHOSPHORUS  --   --   --  2.9 3.1  --   --  3.7   TOTAL PROTEIN 6.6 5.8*  --  5.8* 6.0 6.3 6.6 5.9*   ALBUMIN 3.7 3.2*  --  3.2* 3.3* 3.1* 3.5 3.1*   GLOBULIN 2.9 2.6  --  2.6 2.7 3.2 3.1 2.8   ALT (SGPT) 13 11  --  19 14 18 20 13   AST (SGOT) 14 11  --  20 15 16 22 13   BILIRUBIN 0.3 0.3  --  0.3 0.3 0.3 0.4 0.4   ALK PHOS 104 90  --  102 100 89 96 85   AMYLASE  --   --   --   --   --   --  487* 558*   LIPASE 1,379* 1,232* 1,359*  --  1,195* 1,088* 1,136* 1,099*      .cbc  CBC:      Lab 12/12/23  0523 12/11/23  0414 12/10/23  0632 12/09/23  0355 12/08/23  0351   WBC 3.80 4.10 4.80 4.80 3.50   HEMOGLOBIN 12.0* 11.2* 11.9* 11.3* 12.4*   HEMATOCRIT 35.8* 33.7* 36.2* 34.7* 37.4*   PLATELETS 414 367 409 433 403   NEUTROS ABS 2.20 2.50 3.10 3.20 2.00   LYMPHS ABS 1.00 1.00 1.00 1.00 1.00   MONOS ABS 0.40 0.30 0.40 0.40 0.30   EOS ABS 0.30 0.20 0.20 0.20 0.20   MCV 84.4 82.6 84.6 82.7 84.3        Assessment & Plan      Brief Patient Summary:  Dayron Manley is a 43 y.o. male with no significant past medical history who presented to [facility] on 11/30/2023 with abdominal pain. Per patient, he has had abdominal pain for the past few days.  Abdominal pain is located in the epigastric area, and rated as a 10/10 on a pain scale..  Patient was recently admitted 11/19 and discharged on 11/24 after being treated for acute pancreatitis.   In the ED, vital signs were stable.  Labs remarkable for creatinine 1.85, lipase 2138, WBC 14.3, urinalysis positive for nitrates, leukocytes and proteins.  Alcohol level was negative.  CT abdomen shows acute pancreatitis with new  fluid collection seen within the pancreatic neck and body likely representing pseudocyst.      enoxaparin, 40 mg, Subcutaneous, Q24H  pancrelipase (Lip-Prot-Amyl), 12,000 units of lipase, Oral, TID With Meals  senna-docusate sodium, 2 tablet, Oral, BID   And  polyethylene glycol, 17 g, Oral, Daily  sodium chloride, 10 mL, Intravenous, Q12H       dextrose 5 % and sodium chloride 0.45 %, 150 mL/hr, Last Rate: 150 mL/hr (12/12/23 9841)         Active Hospital Problems:  Active Hospital Problems    Diagnosis     **Acute pancreatitis      Acute alcoholic pancreatitis - improving   Pancreatic pseudocyst  Abdominal pain-improving  -Lipase remains elevated but stable  - CT showing acute pancreatitis with fluid collection within the pancreatic neck likely representing pseudocyst.  Recommend free fluid in the pelvis related to pancreatitis and tracking inflammatory changes.  Bowels not dilated  -His abdominal pain is better.  He is status post NG tube placement.  -ESOPHAGOGASTRODUODENOSCOPY with NJ tube placement.  He is tolerating G-tube feeds.  GI to, decide about p.o. feeds and discontinuation of tube feeds.  Will continue IV fluids, continue pain control as ordered  -Replace electrolytes as needed  -Encouraged complete tobacco and alcohol cessation strongly recommended  -Per GI - We will continue nasojejunal tube feeds.  Continue full liquid diet.  If tolerated we will discontinue the Dobbhoff tube and plan to discharge home.  Continue PPI and Creon.       SINGH from volume depletion- resolved   -Continue IV fluid hydration, will decrease to 100 cc/h  -Follow BMP    Constipation -  -bowel regimen as prescribed-senna: two tablets P.O BID and miralax 17g P.O Qday.       DVT prophylaxis:  Medical DVT prophylaxis orders are present.    CODE STATUS:    Code Status (Patient has no pulse and is not breathing): CPR (Attempt to Resuscitate)  Medical Interventions (Patient has pulse or is breathing): Full Support      Disposition:  I  expect patient to be discharged 1 to 2 days    I discussed the patient's findings and my recommendations with the patient.    Electronically signed by Fabian Stratton MD, 12/12/23, 10:57 AM MARISOL.      [facility] Hospitalist Team

## 2023-12-13 LAB
ALBUMIN SERPL-MCNC: 3.7 G/DL (ref 3.5–5.2)
ALBUMIN/GLOB SERPL: 1.2 G/DL
ALP SERPL-CCNC: 97 U/L (ref 39–117)
ALT SERPL W P-5'-P-CCNC: 13 U/L (ref 1–41)
ANION GAP SERPL CALCULATED.3IONS-SCNC: 7 MMOL/L (ref 5–15)
AST SERPL-CCNC: 15 U/L (ref 1–40)
BASOPHILS # BLD AUTO: 0 10*3/MM3 (ref 0–0.2)
BASOPHILS NFR BLD AUTO: 0.9 % (ref 0–1.5)
BILIRUB SERPL-MCNC: 0.3 MG/DL (ref 0–1.2)
BUN SERPL-MCNC: 10 MG/DL (ref 6–20)
BUN/CREAT SERPL: 13.5 (ref 7–25)
CALCIUM SPEC-SCNC: 9.3 MG/DL (ref 8.6–10.5)
CHLORIDE SERPL-SCNC: 102 MMOL/L (ref 98–107)
CO2 SERPL-SCNC: 27 MMOL/L (ref 22–29)
CREAT SERPL-MCNC: 0.74 MG/DL (ref 0.76–1.27)
CRP SERPL-MCNC: 1.64 MG/DL (ref 0–0.5)
DEPRECATED RDW RBC AUTO: 39.8 FL (ref 37–54)
EGFRCR SERPLBLD CKD-EPI 2021: 116 ML/MIN/1.73
EOSINOPHIL # BLD AUTO: 0.3 10*3/MM3 (ref 0–0.4)
EOSINOPHIL NFR BLD AUTO: 5.9 % (ref 0.3–6.2)
ERYTHROCYTE [DISTWIDTH] IN BLOOD BY AUTOMATED COUNT: 13 % (ref 12.3–15.4)
GLOBULIN UR ELPH-MCNC: 3 GM/DL
GLUCOSE BLDC GLUCOMTR-MCNC: 108 MG/DL (ref 70–105)
GLUCOSE BLDC GLUCOMTR-MCNC: 117 MG/DL (ref 70–105)
GLUCOSE BLDC GLUCOMTR-MCNC: 122 MG/DL (ref 70–105)
GLUCOSE BLDC GLUCOMTR-MCNC: 124 MG/DL (ref 70–105)
GLUCOSE SERPL-MCNC: 126 MG/DL (ref 65–99)
HCT VFR BLD AUTO: 35.7 % (ref 37.5–51)
HGB BLD-MCNC: 11.8 G/DL (ref 13–17.7)
LIPASE SERPL-CCNC: 1262 U/L (ref 13–60)
LYMPHOCYTES # BLD AUTO: 0.9 10*3/MM3 (ref 0.7–3.1)
LYMPHOCYTES NFR BLD AUTO: 15.7 % (ref 19.6–45.3)
MCH RBC QN AUTO: 27.2 PG (ref 26.6–33)
MCHC RBC AUTO-ENTMCNC: 32.9 G/DL (ref 31.5–35.7)
MCV RBC AUTO: 82.7 FL (ref 79–97)
MONOCYTES # BLD AUTO: 0.5 10*3/MM3 (ref 0.1–0.9)
MONOCYTES NFR BLD AUTO: 8.2 % (ref 5–12)
NEUTROPHILS NFR BLD AUTO: 3.8 10*3/MM3 (ref 1.7–7)
NEUTROPHILS NFR BLD AUTO: 69.3 % (ref 42.7–76)
NRBC BLD AUTO-RTO: 0 /100 WBC (ref 0–0.2)
PLATELET # BLD AUTO: 385 10*3/MM3 (ref 140–450)
PMV BLD AUTO: 8.4 FL (ref 6–12)
POTASSIUM SERPL-SCNC: 4.2 MMOL/L (ref 3.5–5.2)
PROT SERPL-MCNC: 6.7 G/DL (ref 6–8.5)
RBC # BLD AUTO: 4.32 10*6/MM3 (ref 4.14–5.8)
SODIUM SERPL-SCNC: 136 MMOL/L (ref 136–145)
WBC NRBC COR # BLD AUTO: 5.5 10*3/MM3 (ref 3.4–10.8)

## 2023-12-13 PROCEDURE — 85025 COMPLETE CBC W/AUTO DIFF WBC: CPT | Performed by: STUDENT IN AN ORGANIZED HEALTH CARE EDUCATION/TRAINING PROGRAM

## 2023-12-13 PROCEDURE — 25010000002 ENOXAPARIN PER 10 MG: Performed by: HOSPITALIST

## 2023-12-13 PROCEDURE — 82948 REAGENT STRIP/BLOOD GLUCOSE: CPT

## 2023-12-13 PROCEDURE — 86140 C-REACTIVE PROTEIN: CPT | Performed by: NURSE PRACTITIONER

## 2023-12-13 PROCEDURE — 80053 COMPREHEN METABOLIC PANEL: CPT | Performed by: STUDENT IN AN ORGANIZED HEALTH CARE EDUCATION/TRAINING PROGRAM

## 2023-12-13 PROCEDURE — 83690 ASSAY OF LIPASE: CPT | Performed by: NURSE PRACTITIONER

## 2023-12-13 RX ADMIN — DEXTROSE AND SODIUM CHLORIDE 100 ML/HR: 5; 450 INJECTION, SOLUTION INTRAVENOUS at 19:55

## 2023-12-13 RX ADMIN — Medication 10 ML: at 11:49

## 2023-12-13 RX ADMIN — OXYCODONE HYDROCHLORIDE 20 MG: 5 TABLET ORAL at 15:44

## 2023-12-13 RX ADMIN — PANCRELIPASE 12000 UNITS OF LIPASE: 60000; 12000; 38000 CAPSULE, DELAYED RELEASE PELLETS ORAL at 07:45

## 2023-12-13 RX ADMIN — OXYCODONE HYDROCHLORIDE 20 MG: 5 TABLET ORAL at 19:50

## 2023-12-13 RX ADMIN — OXYCODONE HYDROCHLORIDE 20 MG: 5 TABLET ORAL at 07:45

## 2023-12-13 RX ADMIN — OXYCODONE HYDROCHLORIDE 20 MG: 5 TABLET ORAL at 11:48

## 2023-12-13 RX ADMIN — PANCRELIPASE 12000 UNITS OF LIPASE: 60000; 12000; 38000 CAPSULE, DELAYED RELEASE PELLETS ORAL at 11:48

## 2023-12-13 RX ADMIN — ENOXAPARIN SODIUM 40 MG: 100 INJECTION SUBCUTANEOUS at 15:44

## 2023-12-13 RX ADMIN — DEXTROSE AND SODIUM CHLORIDE 100 ML/HR: 5; 450 INJECTION, SOLUTION INTRAVENOUS at 13:50

## 2023-12-13 RX ADMIN — PANCRELIPASE 12000 UNITS OF LIPASE: 60000; 12000; 38000 CAPSULE, DELAYED RELEASE PELLETS ORAL at 17:56

## 2023-12-13 RX ADMIN — DEXTROSE AND SODIUM CHLORIDE 100 ML/HR: 5; 450 INJECTION, SOLUTION INTRAVENOUS at 07:46

## 2023-12-13 RX ADMIN — OXYCODONE HYDROCHLORIDE 20 MG: 5 TABLET ORAL at 02:52

## 2023-12-13 NOTE — PROGRESS NOTES
[facility] MEDICINE SERVICE  DAILY PROGRESS NOTE    Patient Name: Dayron Manley  : 1980  MRN: [id number]  Primary Care Physician:  Nancy El APRN  Date of admission: 2023  Date of service: 23      Subjective      Chief Complaint: Abdominal pain       History of Present Illness: Dayron Manley is a 43 y.o. male with no significant past medical history who presented to [facility] on 2023 with abdominal pain. Per patient, he has had abdominal pain for the past few days.  Abdominal pain is located in the epigastric area, and rated as a 10/10 on a pain scale.  Patient also reported nausea and vomiting.  Endorses decreased appetite.  Of note, patient was recently admitted  and discharged on  after being treated for acute pancreatitis.  Patient stated that that was his first time being admitted for pancreatitis. Pt used to be a heavy drinker a decade ago. He quit drinking excessively about 10 years ago and only drinks rarely on occasions with the last drink being on his birthday, .  Denies any fever or chills, shortness of breath, chest pain, weakness or any recent sick contact. On admission, vital signs were stable.  Labs remarkable for creatinine 1.85, lipase 2138, WBC 14.3, urinalysis positive for nitrates, leukocytes and proteins.  Alcohol level was negative.  CT abdomen shows acute pancreatitis with new fluid collection seen within the pancreatic neck and body likely representing pseudocyst.     23:    Patient seen and examined. Patient did well overnight and has no new GI complaints today.    Lying in bed, appears comfortable.  Vital signs stable.  Reports continues to have mild abdominal pain.   No further nausea or vomiting reported.  He is able to tolerate full liquid diet.  I encourage patient to increase oral intake.  NG tube still in place and tolerating tube feeds. He is passing gas but has not had any bowel movements.      GI is following.  Case management is following.  Plan for disposition to home with home health care arrangements.    Patient is hemodynamically stable at the time my exam.    ROS A 12 point review of system was done and was negative except as mentioned above      Objective      Vitals:   Temp:  [97.7 °F (36.5 °C)-97.9 °F (36.6 °C)] 97.7 °F (36.5 °C)  Heart Rate:  [71-83] 83  Resp:  [16-19] 19  BP: (118-128)/(77-90) 121/90    Physical Exam  Constitutional:       Appearance: Normal appearance.   HENT:      Head: Normocephalic and atraumatic.      Nose: Nose normal.   Cardiovascular:      Rate and Rhythm: Normal rate.      Heart sounds: Normal heart sounds.   Pulmonary:      Effort: Pulmonary effort is normal. No respiratory distress.      Breath sounds: Normal breath sounds. No stridor. No wheezing, rhonchi or rales.   Chest:      Chest wall: No tenderness.   Abdominal:      General: Abdomen is flat. There is no distension.      Palpations: Abdomen is soft. There is no mass.      Tenderness: There is no abdominal tenderness. There is no right CVA tenderness, left CVA tenderness, guarding or rebound.      Comments: NG tube in place   Musculoskeletal:      Cervical back: Normal range of motion.   Skin:     General: Skin is warm and dry.   Neurological:      General: No focal deficit present.      Mental Status: He is alert.   Psychiatric:         Mood and Affect: Mood normal.         Behavior: Behavior normal.             Result Review    Result Review:  I have personally reviewed the results from the time of this admission to 12/13/2023 14:07 EST and agree with these findings:  [x]  Laboratory  []  Microbiology  [x]  Radiology  []  EKG/Telemetry   []  Cardiology/Vascular   []  Pathology  []  Old records  []  Other:    CMP:        Lab 12/13/23  0420 12/12/23  0523 12/11/23  0414 12/10/23  1151 12/10/23  0632 12/09/23  0355 12/08/23  0351 12/07/23  0418   SODIUM 136 139 140  --  141 138   < > 139   POTASSIUM 4.2 4.0  4.2  --  3.9 3.8   < > 3.9   CHLORIDE 102 102 106  --  106 102   < > 99   CO2 27.0 29.0 25.0  --  28.0 26.0   < > 30.0*   ANION GAP 7.0 8.0 9.0  --  7.0 10.0   < > 10.0   BUN 10 10 6  --  3* 2*   < > 3*   CREATININE 0.74* 0.77 0.65*  --  0.76 0.77   < > 0.81   EGFR 116.0 114.6 120.7  --  115.1 114.6   < > 112.9   GLUCOSE 126* 89 111*  --  102* 112*   < > 104*   CALCIUM 9.3 9.4 8.8  --  8.5* 8.5*   < > 9.4   MAGNESIUM  --   --   --   --  2.3  --   --   --    PHOSPHORUS  --   --   --   --  2.9 3.1  --   --    TOTAL PROTEIN 6.7 6.6 5.8*  --  5.8* 6.0   < > 6.6   ALBUMIN 3.7 3.7 3.2*  --  3.2* 3.3*   < > 3.5   GLOBULIN 3.0 2.9 2.6  --  2.6 2.7   < > 3.1   ALT (SGPT) 13 13 11  --  19 14   < > 20   AST (SGOT) 15 14 11  --  20 15   < > 22   BILIRUBIN 0.3 0.3 0.3  --  0.3 0.3   < > 0.4   ALK PHOS 97 104 90  --  102 100   < > 96   AMYLASE  --   --   --   --   --   --   --  487*   LIPASE 1,262* 1,379* 1,232* 1,359*  --  1,195*   < > 1,136*    < > = values in this interval not displayed.      .cbc  CBC:      Lab 12/13/23  0420 12/12/23  0523 12/11/23  0414 12/10/23  0632 12/09/23  0355   WBC 5.50 3.80 4.10 4.80 4.80   HEMOGLOBIN 11.8* 12.0* 11.2* 11.9* 11.3*   HEMATOCRIT 35.7* 35.8* 33.7* 36.2* 34.7*   PLATELETS 385 414 367 409 433   NEUTROS ABS 3.80 2.20 2.50 3.10 3.20   LYMPHS ABS 0.90 1.00 1.00 1.00 1.00   MONOS ABS 0.50 0.40 0.30 0.40 0.40   EOS ABS 0.30 0.30 0.20 0.20 0.20   MCV 82.7 84.4 82.6 84.6 82.7        Assessment & Plan      Brief Patient Summary:  Dayron Manley is a 43 y.o. male with no significant past medical history who presented to [facility] on 11/30/2023 with abdominal pain. Per patient, he has had abdominal pain for the past few days.  Abdominal pain is located in the epigastric area, and rated as a 10/10 on a pain scale..  Patient was recently admitted 11/19 and discharged on 11/24 after being treated for acute pancreatitis.   In the ED, vital signs were stable.  Labs remarkable for  creatinine 1.85, lipase 2138, WBC 14.3, urinalysis positive for nitrates, leukocytes and proteins.  Alcohol level was negative.  CT abdomen shows acute pancreatitis with new fluid collection seen within the pancreatic neck and body likely representing pseudocyst.      enoxaparin, 40 mg, Subcutaneous, Q24H  pancrelipase (Lip-Prot-Amyl), 12,000 units of lipase, Oral, TID With Meals  senna-docusate sodium, 2 tablet, Oral, BID   And  polyethylene glycol, 17 g, Oral, Daily  sodium chloride, 10 mL, Intravenous, Q12H       dextrose 5 % and sodium chloride 0.45 %, 100 mL/hr, Last Rate: 100 mL/hr (12/13/23 1350)         Active Hospital Problems:  Active Hospital Problems    Diagnosis     **Acute pancreatitis      Acute alcoholic pancreatitis - improving   Pancreatic pseudocyst  Abdominal pain-improving  -Lipase remains elevated but stable  - CT showing acute pancreatitis with fluid collection within the pancreatic neck likely representing pseudocyst.  Recommend free fluid in the pelvis related to pancreatitis and tracking inflammatory changes.  Bowels not dilated  -His abdominal pain is better.  He is status post NG tube placement.  -ESOPHAGOGASTRODUODENOSCOPY with NJ tube placement.  He is tolerating G-tube feeds.  GI to, decide about p.o. feeds and discontinuation of tube feeds.  Will continue IV fluids, continue pain control as ordered  -Replace electrolytes as needed  -Encouraged complete tobacco and alcohol cessation strongly recommended  -Per GI - We will continue nasojejunal tube feeds.  Continue full liquid diet.  Continue PPI and Creon.  Continue pain control with oxycodone as needed  -Case management following for discharge planning to home with home health care       SINGH from volume depletion- resolved   -Continue IV fluid hydration, will decrease to 100 cc/h  -Follow BMP    Constipation -  -bowel regimen as prescribed-senna: two tablets P.O BID and miralax 17g P.O Qday.       DVT prophylaxis:  Medical DVT  prophylaxis orders are present.    CODE STATUS:    Code Status (Patient has no pulse and is not breathing): CPR (Attempt to Resuscitate)  Medical Interventions (Patient has pulse or is breathing): Full Support      Disposition:  I expect patient to be discharged tomorrow, 12/14    I discussed the patient's findings and my recommendations with the patient.    Electronically signed by Fabian Stratton MD, 12/13/23, 2:08 PM EST.        [facility]

## 2023-12-13 NOTE — CASE MANAGEMENT/SOCIAL WORK
Continued Stay Note   [place]     Patient Name: Dayron Manley  MRN: [id number]  Today's Date: 12/13/2023    Admit Date: 11/30/2023  Crystal from [facility] informed CM she will meet patient and his mother on 12/14 between 9-10 AM at bedside to do education on j-tube feedings  Plan: Return home alone. Meds to Bed. [facility] for NJ tube feedings   Discharge Plan       Row Name 12/13/23 1433       Plan    Plan Return home alone. Meds to Bed. [facility] for NJ tube feedings    Plan Comments Barriers: NJtube with feedings. Full liquid diet.  CM contacted Erin with [facility] and added to Epic. Erin said they will educate him on NJT feedings and will follow up with him at home. They have Jevity 1.5 to use for his tube feedings. CM met with Mr. Manley and informed him of acceptance by [facility]. Mr. Manley's mother was at bedside with questions about discharge planning that were answered with him. YARELIS had made an appointment for [facility] for 12/13 and CM contacted them and rescheduled appointment for 12/18 and placed on his AVS and gave his mother a written appointment with [facility] address and phone number. She will be assisting him at home but he does live alone.                  Maintained distance greater than six feet and spent less than 15 minutes in the room.       Lorena DUGAN,RN Case Manager  [facility]  Phone: Desk- [phone]  Cell- [phone]

## 2023-12-13 NOTE — PLAN OF CARE
Problem: Adult Inpatient Plan of Care  Goal: Plan of Care Review  Outcome: Ongoing, Progressing  Flowsheets (Taken 12/12/2023 2135)  Progress: no change  Outcome Evaluation: Still has continuous tube feeding infusing. PRN pain medications being utilized every 4 hours. Will continue to monitor.  Goal: Patient-Specific Goal (Individualized)  Outcome: Ongoing, Progressing  Goal: Absence of Hospital-Acquired Illness or Injury  Outcome: Ongoing, Progressing  Intervention: Identify and Manage Fall Risk  Recent Flowsheet Documentation  Taken 12/12/2023 2000 by Leida Hanson RN  Safety Promotion/Fall Prevention: safety round/check completed  Intervention: Prevent Skin Injury  Recent Flowsheet Documentation  Taken 12/12/2023 2000 by Leida Hanson RN  Body Position: position changed independently  Skin Protection:   adhesive use limited   tubing/devices free from skin contact  Intervention: Prevent and Manage VTE (Venous Thromboembolism) Risk  Recent Flowsheet Documentation  Taken 12/12/2023 2000 by Leida Hanson RN  Activity Management: ambulated in room  VTE Prevention/Management: sequential compression devices off  Intervention: Prevent Infection  Recent Flowsheet Documentation  Taken 12/12/2023 2000 by Leida Hanson RN  Infection Prevention:   rest/sleep promoted   single patient room provided   hand hygiene promoted  Goal: Optimal Comfort and Wellbeing  Outcome: Ongoing, Progressing  Intervention: Provide Person-Centered Care  Recent Flowsheet Documentation  Taken 12/12/2023 2000 by Leida Hanson RN  Trust Relationship/Rapport:   choices provided   care explained   questions answered   questions encouraged   reassurance provided   thoughts/feelings acknowledged  Goal: Readiness for Transition of Care  Outcome: Ongoing, Progressing     Problem: Pain Acute  Goal: Acceptable Pain Control and Functional Ability  Outcome: Ongoing, Progressing  Intervention: Prevent or Manage  Pain  Recent Flowsheet Documentation  Taken 12/12/2023 2000 by Leida Hanson RN  Medication Review/Management: medications reviewed  Intervention: Optimize Psychosocial Wellbeing  Recent Flowsheet Documentation  Taken 12/12/2023 2000 by Leida Hanson RN  Supportive Measures: active listening utilized  Diversional Activities:   television   smartphone     Problem: Fall Injury Risk  Goal: Absence of Fall and Fall-Related Injury  Outcome: Ongoing, Progressing  Intervention: Identify and Manage Contributors  Recent Flowsheet Documentation  Taken 12/12/2023 2000 by Leida Hanson RN  Medication Review/Management: medications reviewed  Intervention: Promote Injury-Free Environment  Recent Flowsheet Documentation  Taken 12/12/2023 2000 by Leida Hanson RN  Safety Promotion/Fall Prevention: safety round/check completed     Problem: Nausea and Vomiting  Goal: Fluid and Electrolyte Balance  Outcome: Ongoing, Progressing  Intervention: Monitor and Manage Hypovolemia  Recent Flowsheet Documentation  Taken 12/12/2023 2000 by Leida Hanson RN  Fluid/Electrolyte Management: fluids provided  Intervention: Prevent and Manage Nausea and Vomiting  Recent Flowsheet Documentation  Taken 12/12/2023 2000 by Leida Hanson RN  Environmental Support: calm environment promoted     Problem: Aspiration (Enteral Nutrition)  Goal: Absence of Aspiration Signs and Symptoms  Outcome: Ongoing, Progressing  Intervention: Minimize Aspiration Risk  Recent Flowsheet Documentation  Taken 12/12/2023 2000 by Leida Hanson RN  Head of Bed (HOB) Positioning: HOB elevated  Enteral Feeding Safety: tubing labeled as enteral feeding     Problem: Device-Related Complication Risk (Enteral Nutrition)  Goal: Safe, Effective Therapy Delivery  Outcome: Ongoing, Progressing  Intervention: Prevent Feeding-Related Adverse Events  Recent Flowsheet Documentation  Taken 12/12/2023 2000 by Leida Hanson  RN  Enteral Feeding Safety: tubing labeled as enteral feeding     Problem: Feeding Intolerance (Enteral Nutrition)  Goal: Feeding Tolerance  Outcome: Ongoing, Progressing   Goal Outcome Evaluation:  Plan of Care Reviewed With: patient        Progress: no change  Outcome Evaluation: Still has continuous tube feeding infusing. PRN pain medications being utilized every 4 hours. Will continue to monitor.

## 2023-12-13 NOTE — DISCHARGE PLACEMENT REQUEST
"Dayron Neville (43 y.o. Male)       Date of Birth   1980    Social Security Number       Address   [address] [place] [address]    Home Phone   [phone]    MRN   [id number]       [employer]   Worship    Marital Status   Single                            Admission Date   11/30/23    Admission Type   Emergency    Admitting Provider   Maile Purvis MD    Attending Provider   Fabian Stratton MD    Department, Room/Bed   [facility] OBSERVATION, 229/1       Discharge Date       Discharge Disposition       Discharge Destination                                 Attending Provider: Fabian Stratton MD    Allergies: No Known Allergies    Isolation: None   Infection: None   Code Status: CPR    Ht: 182.9 cm (72.01\")   Wt: 57.4 kg (126 lb 8.7 oz)    Admission Cmt: None   Principal Problem: Acute pancreatitis [K85.90]                   Active Insurance as of 11/30/2023       Primary Coverage       Payor Plan Insurance Group Employer/Plan Group    HUMANA HUMANA [id number]       Payor Plan Address Payor Plan Phone Number Payor Plan Fax Number Effective Dates    [address] [address] [phone]  10/22/2023 - None Entered    [facility] [address]         Subscriber Name Subscriber Birth Date Member ID       DAYRON NEVILLE 1980 [id number]                     Emergency Contacts        (Rel.) Home Phone Work Phone Mobile Phone    NANCY JOEL (Mother) -- -- [phone]              Insurance Information                  HUMANA/HUMANA Phone: [phone]    Subscriber: Dayron Neville Subscriber#: [id number]    Group#: [id number] Precert#: --          "

## 2023-12-13 NOTE — PLAN OF CARE
Goal Outcome Evaluation:  Plan of Care Reviewed With: patient, mother        Progress: improving  Outcome Evaluation: Patient receiving continuous tube feeds, changed tubing for feed and flush. Patient tolerating po pills. Fluids infusing at 150ml/hr. Plans to [facility] with [facility]. Educated patient and mother at bedside. Will continue to monitor.

## 2023-12-13 NOTE — PROGRESS NOTES
"Nutrition Services    Patient Name: Dayron Manley  YOB: 1980  MRN: [id number]  Admission date: 11/30/2023    PPE Documentation        PPE Worn By Provider Did not enter room for this encounter   PPE Worn By Patient  N/A     PROGRESS NOTE      Encounter Information: Check in for tube feeding and PO intake. RD confirmed with pt's nurse that current TF order is appropriate for pt to go home on.        PO Diet: Diet: Liquid Diets; Full Liquid; Fluid Consistency: Thin (IDDSI 0)   PO Supplements: Boost Plus TID (provides 1080 kcals, 42 g protein if consumed)      PO Intake:  Minimal intake with nature of frequent liquid diets and NPO status this admission        Current nutrition support: Isosource 1.5 at 70 mL/hr + 10 mL q2 water flush    Nutrition support review: Infusing as ordered as of 0800       Labs (reviewed below): Reviewed, management per attending         GI Function:  BM 12/12, prior constipation   Residuals N/A related to J-tube        Nutrition Intervention Updates: Continue current EN prescription, at goal. Diet advancement per GI. Monitor for diet tolerance. Encourage good PO intake.       Results from last 7 days   Lab Units 12/13/23  0420 12/12/23  0523 12/11/23  0414   SODIUM mmol/L 136 139 140   POTASSIUM mmol/L 4.2 4.0 4.2   CHLORIDE mmol/L 102 102 106   CO2 mmol/L 27.0 29.0 25.0   BUN mg/dL 10 10 6   CREATININE mg/dL 0.74* 0.77 0.65*   CALCIUM mg/dL 9.3 9.4 8.8   BILIRUBIN mg/dL 0.3 0.3 0.3   ALK PHOS U/L 97 104 90   ALT (SGPT) U/L 13 13 11   AST (SGOT) U/L 15 14 11   GLUCOSE mg/dL 126* 89 111*     Results from last 7 days   Lab Units 12/13/23  0420 12/11/23  0414 12/10/23  0632   MAGNESIUM mg/dL  --   --  2.3   PHOSPHORUS mg/dL  --   --  2.9   HEMOGLOBIN g/dL 11.8*   < > 11.9*   HEMATOCRIT % 35.7*   < > 36.2*    < > = values in this interval not displayed.     No results found for: \"COVID19\"  Lab Results   Component Value Date    HGBA1C 5.10 11/30/2023     RD to follow up per " protocol.    Electronically signed by:  Asya Zavala RD  12/13/23 12:33 EST

## 2023-12-14 LAB
ALBUMIN SERPL-MCNC: 4 G/DL (ref 3.5–5.2)
ALBUMIN/GLOB SERPL: 1.3 G/DL
ALP SERPL-CCNC: 105 U/L (ref 39–117)
ALT SERPL W P-5'-P-CCNC: 14 U/L (ref 1–41)
ANION GAP SERPL CALCULATED.3IONS-SCNC: 11 MMOL/L (ref 5–15)
AST SERPL-CCNC: 16 U/L (ref 1–40)
BASOPHILS # BLD AUTO: 0 10*3/MM3 (ref 0–0.2)
BASOPHILS NFR BLD AUTO: 0.5 % (ref 0–1.5)
BILIRUB SERPL-MCNC: 0.3 MG/DL (ref 0–1.2)
BUN SERPL-MCNC: 9 MG/DL (ref 6–20)
BUN/CREAT SERPL: 12.7 (ref 7–25)
CALCIUM SPEC-SCNC: 9.5 MG/DL (ref 8.6–10.5)
CHLORIDE SERPL-SCNC: 100 MMOL/L (ref 98–107)
CO2 SERPL-SCNC: 26 MMOL/L (ref 22–29)
CREAT SERPL-MCNC: 0.71 MG/DL (ref 0.76–1.27)
CRP SERPL-MCNC: 1.41 MG/DL (ref 0–0.5)
DEPRECATED RDW RBC AUTO: 38.9 FL (ref 37–54)
EGFRCR SERPLBLD CKD-EPI 2021: 117.5 ML/MIN/1.73
EOSINOPHIL # BLD AUTO: 0.4 10*3/MM3 (ref 0–0.4)
EOSINOPHIL NFR BLD AUTO: 5.2 % (ref 0.3–6.2)
ERYTHROCYTE [DISTWIDTH] IN BLOOD BY AUTOMATED COUNT: 13.3 % (ref 12.3–15.4)
GLOBULIN UR ELPH-MCNC: 3.2 GM/DL
GLUCOSE BLDC GLUCOMTR-MCNC: 101 MG/DL (ref 70–105)
GLUCOSE BLDC GLUCOMTR-MCNC: 117 MG/DL (ref 70–105)
GLUCOSE BLDC GLUCOMTR-MCNC: 120 MG/DL (ref 70–105)
GLUCOSE BLDC GLUCOMTR-MCNC: 138 MG/DL (ref 70–105)
GLUCOSE SERPL-MCNC: 111 MG/DL (ref 65–99)
HCT VFR BLD AUTO: 41.4 % (ref 37.5–51)
HGB BLD-MCNC: 13.3 G/DL (ref 13–17.7)
LIPASE SERPL-CCNC: 1604 U/L (ref 13–60)
LYMPHOCYTES # BLD AUTO: 1.3 10*3/MM3 (ref 0.7–3.1)
LYMPHOCYTES NFR BLD AUTO: 17.3 % (ref 19.6–45.3)
MCH RBC QN AUTO: 26.9 PG (ref 26.6–33)
MCHC RBC AUTO-ENTMCNC: 32 G/DL (ref 31.5–35.7)
MCV RBC AUTO: 84 FL (ref 79–97)
MONOCYTES # BLD AUTO: 0.6 10*3/MM3 (ref 0.1–0.9)
MONOCYTES NFR BLD AUTO: 8 % (ref 5–12)
NEUTROPHILS NFR BLD AUTO: 5.2 10*3/MM3 (ref 1.7–7)
NEUTROPHILS NFR BLD AUTO: 69 % (ref 42.7–76)
NRBC BLD AUTO-RTO: 0 /100 WBC (ref 0–0.2)
PLATELET # BLD AUTO: 414 10*3/MM3 (ref 140–450)
PMV BLD AUTO: 9 FL (ref 6–12)
POTASSIUM SERPL-SCNC: 4.4 MMOL/L (ref 3.5–5.2)
PROT SERPL-MCNC: 7.2 G/DL (ref 6–8.5)
RBC # BLD AUTO: 4.93 10*6/MM3 (ref 4.14–5.8)
SODIUM SERPL-SCNC: 137 MMOL/L (ref 136–145)
WBC NRBC COR # BLD AUTO: 7.5 10*3/MM3 (ref 3.4–10.8)

## 2023-12-14 PROCEDURE — 25010000002 MORPHINE PER 10 MG: Performed by: NURSE PRACTITIONER

## 2023-12-14 PROCEDURE — 80053 COMPREHEN METABOLIC PANEL: CPT | Performed by: STUDENT IN AN ORGANIZED HEALTH CARE EDUCATION/TRAINING PROGRAM

## 2023-12-14 PROCEDURE — 82948 REAGENT STRIP/BLOOD GLUCOSE: CPT

## 2023-12-14 PROCEDURE — 83690 ASSAY OF LIPASE: CPT | Performed by: NURSE PRACTITIONER

## 2023-12-14 PROCEDURE — 25010000002 ONDANSETRON PER 1 MG: Performed by: NURSE PRACTITIONER

## 2023-12-14 PROCEDURE — 25010000002 ENOXAPARIN PER 10 MG: Performed by: HOSPITALIST

## 2023-12-14 PROCEDURE — 86140 C-REACTIVE PROTEIN: CPT | Performed by: NURSE PRACTITIONER

## 2023-12-14 PROCEDURE — 85025 COMPLETE CBC W/AUTO DIFF WBC: CPT | Performed by: STUDENT IN AN ORGANIZED HEALTH CARE EDUCATION/TRAINING PROGRAM

## 2023-12-14 RX ORDER — HYDROMORPHONE HYDROCHLORIDE 2 MG/1
2 TABLET ORAL EVERY 4 HOURS PRN
Status: DISCONTINUED | OUTPATIENT
Start: 2023-12-14 | End: 2023-12-15

## 2023-12-14 RX ORDER — ALPRAZOLAM 0.25 MG/1
0.25 TABLET ORAL 3 TIMES DAILY PRN
Status: DISCONTINUED | OUTPATIENT
Start: 2023-12-14 | End: 2023-12-15

## 2023-12-14 RX ORDER — HYDROMORPHONE HYDROCHLORIDE 2 MG/1
2 TABLET ORAL
Status: DISCONTINUED | OUTPATIENT
Start: 2023-12-14 | End: 2023-12-14

## 2023-12-14 RX ORDER — MORPHINE SULFATE 10 MG/.5ML
10 SOLUTION ORAL EVERY 6 HOURS PRN
Status: DISCONTINUED | OUTPATIENT
Start: 2023-12-14 | End: 2023-12-14

## 2023-12-14 RX ORDER — AMITRIPTYLINE HYDROCHLORIDE 25 MG/1
25 TABLET, FILM COATED ORAL NIGHTLY
Status: DISCONTINUED | OUTPATIENT
Start: 2023-12-14 | End: 2023-12-15

## 2023-12-14 RX ORDER — HYDROMORPHONE HYDROCHLORIDE 2 MG/1
2 TABLET ORAL EVERY 6 HOURS PRN
Status: DISCONTINUED | OUTPATIENT
Start: 2023-12-14 | End: 2023-12-14

## 2023-12-14 RX ORDER — ONDANSETRON 2 MG/ML
4 INJECTION INTRAMUSCULAR; INTRAVENOUS EVERY 6 HOURS PRN
Status: DISCONTINUED | OUTPATIENT
Start: 2023-12-14 | End: 2023-12-19 | Stop reason: HOSPADM

## 2023-12-14 RX ORDER — MORPHINE SULFATE 2 MG/ML
2 INJECTION, SOLUTION INTRAMUSCULAR; INTRAVENOUS ONCE AS NEEDED
Status: COMPLETED | OUTPATIENT
Start: 2023-12-14 | End: 2023-12-14

## 2023-12-14 RX ADMIN — ALPRAZOLAM 0.25 MG: 0.25 TABLET ORAL at 19:57

## 2023-12-14 RX ADMIN — Medication 10 ML: at 20:45

## 2023-12-14 RX ADMIN — DEXTROSE AND SODIUM CHLORIDE 100 ML/HR: 5; 450 INJECTION, SOLUTION INTRAVENOUS at 13:18

## 2023-12-14 RX ADMIN — Medication 10 ML: at 08:38

## 2023-12-14 RX ADMIN — ONDANSETRON 4 MG: 2 INJECTION INTRAMUSCULAR; INTRAVENOUS at 20:44

## 2023-12-14 RX ADMIN — OXYCODONE HYDROCHLORIDE 20 MG: 5 TABLET ORAL at 04:09

## 2023-12-14 RX ADMIN — PANCRELIPASE 12000 UNITS OF LIPASE: 60000; 12000; 38000 CAPSULE, DELAYED RELEASE PELLETS ORAL at 08:36

## 2023-12-14 RX ADMIN — PANCRELIPASE 12000 UNITS OF LIPASE: 60000; 12000; 38000 CAPSULE, DELAYED RELEASE PELLETS ORAL at 17:18

## 2023-12-14 RX ADMIN — DEXTROSE AND SODIUM CHLORIDE 100 ML/HR: 5; 450 INJECTION, SOLUTION INTRAVENOUS at 22:26

## 2023-12-14 RX ADMIN — HYDROMORPHONE HYDROCHLORIDE 2 MG: 2 TABLET ORAL at 16:27

## 2023-12-14 RX ADMIN — MORPHINE SULFATE 2 MG: 2 INJECTION, SOLUTION INTRAMUSCULAR; INTRAVENOUS at 22:21

## 2023-12-14 RX ADMIN — ENOXAPARIN SODIUM 40 MG: 100 INJECTION SUBCUTANEOUS at 16:27

## 2023-12-14 RX ADMIN — AMITRIPTYLINE HYDROCHLORIDE 25 MG: 25 TABLET, FILM COATED ORAL at 19:57

## 2023-12-14 RX ADMIN — OXYCODONE HYDROCHLORIDE 20 MG: 5 TABLET ORAL at 00:07

## 2023-12-14 RX ADMIN — HYDROMORPHONE HYDROCHLORIDE 2 MG: 2 TABLET ORAL at 11:47

## 2023-12-14 RX ADMIN — HYDROMORPHONE HYDROCHLORIDE 2 MG: 2 TABLET ORAL at 20:44

## 2023-12-14 RX ADMIN — DEXTROSE AND SODIUM CHLORIDE 100 ML/HR: 5; 450 INJECTION, SOLUTION INTRAVENOUS at 04:15

## 2023-12-14 RX ADMIN — ALPRAZOLAM 0.25 MG: 0.25 TABLET ORAL at 13:17

## 2023-12-14 RX ADMIN — PANCRELIPASE 12000 UNITS OF LIPASE: 60000; 12000; 38000 CAPSULE, DELAYED RELEASE PELLETS ORAL at 10:55

## 2023-12-14 RX ADMIN — OXYCODONE HYDROCHLORIDE 20 MG: 5 TABLET ORAL at 08:36

## 2023-12-14 NOTE — PROGRESS NOTES
[facility] MEDICINE SERVICE  DAILY PROGRESS NOTE    NAME: Dayron Manley  : 1980  MRN: [id number]      LOS: 13 days     PROVIDER OF SERVICE: Tyrone Dawosn MD    Chief Complaint: Acute pancreatitis    Subjective:     Interval History:  History taken from: patient  Patient Complaints: Abdominal pain scale of 6-7 out of 10 at, endorses that current medication transiently helps and requested for Dilaudid  Patient Denies: Chills, no vomiting    Review of Systems:   Review of Systems   Constitutional:  Positive for fever. Negative for chills.   Gastrointestinal:  Positive for abdominal pain. Negative for blood in stool and vomiting.       Objective:     Vital Signs  Temp:  [97.5 °F (36.4 °C)-98.4 °F (36.9 °C)] 97.7 °F (36.5 °C)  Heart Rate:  [83-91] 91  Resp:  [16-20] 18  BP: (124-142)/(77-88) 142/85   Body mass index is 17.16 kg/m².    Physical Exam  Physical Exam  Constitutional:       Comments: NJ tube in place   HENT:      Head: Atraumatic.      Mouth/Throat:      Mouth: Mucous membranes are moist.   Eyes:      Pupils: Pupils are equal, round, and reactive to light.   Cardiovascular:      Rate and Rhythm: Normal rate and regular rhythm.   Pulmonary:      Effort: Pulmonary effort is normal.      Breath sounds: Normal breath sounds.   Abdominal:      Palpations: Abdomen is soft.      Tenderness: There is abdominal tenderness. There is no guarding or rebound.   Musculoskeletal:         General: Normal range of motion.      Cervical back: Neck supple.   Skin:     General: Skin is warm.   Neurological:      General: No focal deficit present.      Mental Status: He is oriented to person, place, and time.   Psychiatric:         Mood and Affect: Mood normal.         Scheduled Meds   enoxaparin, 40 mg, Subcutaneous, Q24H  pancrelipase (Lip-Prot-Amyl), 12,000 units of lipase, Oral, TID With Meals  senna-docusate sodium, 2 tablet, Oral, BID   And  polyethylene glycol, 17 g, Oral, Daily  sodium chloride, 10  mL, Intravenous, Q12H       PRN Meds     ALPRAZolam    senna-docusate sodium **AND** polyethylene glycol **AND** bisacodyl **AND** bisacodyl    dextrose    dextrose    glucagon (human recombinant)    HYDROmorphone    sodium chloride    sodium chloride    sodium chloride   Infusions  dextrose 5 % and sodium chloride 0.45 %, 100 mL/hr, Last Rate: 100 mL/hr (12/14/23 1318)          Diagnostic Data    Results from last 7 days   Lab Units 12/14/23  0401   WBC 10*3/mm3 7.50   HEMOGLOBIN g/dL 13.3   HEMATOCRIT % 41.4   PLATELETS 10*3/mm3 414   GLUCOSE mg/dL 111*   CREATININE mg/dL 0.71*   BUN mg/dL 9   SODIUM mmol/L 137   POTASSIUM mmol/L 4.4   AST (SGOT) U/L 16   ALT (SGPT) U/L 14   ALK PHOS U/L 105   BILIRUBIN mg/dL 0.3   ANION GAP mmol/L 11.0       No radiology results for the last day      I reviewed the patient's new clinical results.    Assessment/Plan:     Active and Resolved Problems  Acute pancreatitis likely alcohol induced  Pancreatic pseudocyst  Abdominal pain  -Lipase remains elevated but stable  - CT showing acute pancreatitis with fluid collection within the pancreatic neck likely representing pseudocyst.  Recommend free fluid in the pelvis related to pancreatitis and tracking inflammatory changes.  Bowels not dilated  -His abdominal pain is better.  He is status post NG tube placement.  -ESOPHAGOGASTRODUODENOSCOPY with NJ tube placement.  He is tolerating G-tube feeds.  GI to, decide about p.o. feeds and discontinuation of tube feeds.  Will continue IV fluids, continue pain control as ordered  -Replace electrolytes as needed  -Encouraged complete tobacco and alcohol cessation strongly recommended  -Per GI - We will continue nasojejunal tube feeds.  Continue full liquid diet.  Continue PPI and Creon.  Continue pain control with po hydromorphone  as needed  -Case management following for discharge planning to home with home health care    SINGH  Likely from volume depletion -improved    Constipation  - Likely  from pain medication continue bowel regimen  MiraLAX and senna    DVT prophylaxis:  Medical DVT prophylaxis orders are present.     Code status is   Code Status and Medical Interventions:   Ordered at: 12/01/23 0051     Code Status (Patient has no pulse and is not breathing):    CPR (Attempt to Resuscitate)     Medical Interventions (Patient has pulse or is breathing):    Full Support       Plan for disposition:home  in 1-2 days    Time: 32 minutes    Signature: Electronically signed by Tyrone Dawson MD, 12/14/23, 15:08 [facility].  [facility] [place], [facility]

## 2023-12-14 NOTE — PROGRESS NOTES
"Nutrition Services    Patient Name: Dayron Manley  YOB: 1980  MRN: [id number]  Admission date: 11/30/2023    PPE Documentation        PPE Worn By Provider Did not enter room for this encounter   PPE Worn By Patient  N/A     PROGRESS NOTE      Encounter Information: Check in for tube feeding and PO intake. [facility] nurse to complete education on home TF.       PO Diet: Diet: Liquid Diets; Full Liquid; Fluid Consistency: Thin (IDDSI 0)   PO Supplements: Boost Plus TID (provides 1080 kcals, 42 g protein if consumed)      PO Intake:  Minimal intake with nature of frequent liquid diets and NPO status this admission        Current nutrition support: Isosource 1.5 at 70 mL/hr + 10 mL q2 water flush    Nutrition support review: Infusing as ordered per documentation        Labs (reviewed below): Reviewed, management per attending         GI Function:  BM 12/12, prior constipation   Residuals N/A related to J-tube        Nutrition Intervention Updates: Continue current EN prescription, at goal. Diet advancement per GI. Monitor for diet tolerance. Encourage good PO intake.       Results from last 7 days   Lab Units 12/14/23  0401 12/13/23  0420 12/12/23  0523   SODIUM mmol/L 137 136 139   POTASSIUM mmol/L 4.4 4.2 4.0   CHLORIDE mmol/L 100 102 102   CO2 mmol/L 26.0 27.0 29.0   BUN mg/dL 9 10 10   CREATININE mg/dL 0.71* 0.74* 0.77   CALCIUM mg/dL 9.5 9.3 9.4   BILIRUBIN mg/dL 0.3 0.3 0.3   ALK PHOS U/L 105 97 104   ALT (SGPT) U/L 14 13 13   AST (SGOT) U/L 16 15 14   GLUCOSE mg/dL 111* 126* 89     Results from last 7 days   Lab Units 12/14/23  0401 12/11/23  0414 12/10/23  0632   MAGNESIUM mg/dL  --   --  2.3   PHOSPHORUS mg/dL  --   --  2.9   HEMOGLOBIN g/dL 13.3   < > 11.9*   HEMATOCRIT % 41.4   < > 36.2*    < > = values in this interval not displayed.     No results found for: \"COVID19\"  Lab Results   Component Value Date    HGBA1C 5.10 11/30/2023     RD to follow up per protocol.    Electronically signed " by:  Asya Zavala RD  12/14/23 10:53 EST

## 2023-12-14 NOTE — CASE MANAGEMENT/SOCIAL WORK
Continued Stay Note  [facility]     Patient Name: Dayron Manley  MRN: [id number]  Today's Date: 12/14/2023    Admit Date: 11/30/2023    Plan: Return home alone. Meds to Bed. [facility] for NJ tube feedings   Discharge Plan       Row Name 12/14/23 1234       Plan    Plan Comments Erin with [facility] met with Mr. Manley and his mother this morning for education on home NJ tube feedings and gave his mother a video and written instructions on pump administration at home. Per Erin with [facility], Mr. Manley did not participate in the education and kept his face covered. Erin is willing to return to educate him if he is willing to participate. CM informed nursing and Dr. Dawson that [facility] said no medications are to be administered via the NJ tube because of risk of clogging.                             Phone communication or documentation only - no physical contact with patient or family.   Lorena DUGAN,RN Case Manager  [facility]  Phone: Desk- [phone] cell- [phone]

## 2023-12-14 NOTE — PLAN OF CARE
Problem: Adult Inpatient Plan of Care  Goal: Plan of Care Review  Outcome: Ongoing, Progressing  Flowsheets (Taken 12/14/2023 0209)  Outcome Evaluation:   IVF'S / HR   TF AT 70 HR   PT REQUIRES PAIN MEDICATION Q4 HRS AS ORDERED   PT UNWILLING TO TRY TO EAT OR DRINK D/T PAIN  Goal: Patient-Specific Goal (Individualized)  Outcome: Ongoing, Progressing  Goal: Absence of Hospital-Acquired Illness or Injury  Outcome: Ongoing, Progressing  Intervention: Identify and Manage Fall Risk  Recent Flowsheet Documentation  Taken 12/14/2023 0200 by Yudy Canales RN  Safety Promotion/Fall Prevention: safety round/check completed  Taken 12/14/2023 0000 by Yudy Canales RN  Safety Promotion/Fall Prevention: safety round/check completed  Taken 12/13/2023 2200 by Yudy Canales RN  Safety Promotion/Fall Prevention: safety round/check completed  Taken 12/13/2023 2000 by Yudy Canales RN  Safety Promotion/Fall Prevention:   safety round/check completed   nonskid shoes/slippers when out of bed  Taken 12/13/2023 1929 by Yudy Canales RN  Safety Promotion/Fall Prevention:   safety round/check completed   nonskid shoes/slippers when out of bed  Intervention: Prevent Skin Injury  Recent Flowsheet Documentation  Taken 12/14/2023 0000 by Yudy Canales RN  Body Position:   position changed independently   30 degrees   sitting up in bed  Taken 12/13/2023 1929 by Yudy Cnaales RN  Body Position:   position changed independently   30 degrees  Goal: Optimal Comfort and Wellbeing  Outcome: Ongoing, Progressing  Intervention: Monitor Pain and Promote Comfort  Recent Flowsheet Documentation  Taken 12/14/2023 0007 by Yudy Canales RN  Pain Management Interventions: see MAR  Taken 12/13/2023 1950 by Yudy Canales RN  Pain Management Interventions: see MAR  Goal: Readiness for Transition of Care  Outcome: Ongoing, Progressing   Goal Outcome Evaluation:              Outcome Evaluation: IVF'S / HR; TF AT 70 HR; PT REQUIRES  PAIN MEDICATION Q4 HRS AS ORDERED; PT UNWILLING TO TRY TO EAT OR DRINK D/T PAIN

## 2023-12-14 NOTE — NURSING NOTE
Patient has had more complaints of pain today. Got some new orders and patient is having some relief this evening. Patient is doing well with po meds but still no appetite. Tube feeds still infusing, bag and line changed today. IV fluids infusing. Patient is able to ambulate to bathroom without difficulty. Mother at bedside whom I've discussed options and care with.   [facility] information is in notes if patient decides he wants to go home and can cooperate with there nurse on education for feeds.   Will continue to monitor.

## 2023-12-14 NOTE — NURSING NOTE
[facility] nurse Erin will need to come back to educate patient on tube feeds, as patient in pain and not cooperative. Mom at bedside did engage in learning, but she will not be with the patient everyday.   Erin's Phone number [phone]

## 2023-12-14 NOTE — PLAN OF CARE
Goal Outcome Evaluation:  Plan of Care Reviewed With: patient, mother              Refusing to participate in learning on this shift

## 2023-12-15 ENCOUNTER — APPOINTMENT (OUTPATIENT)
Dept: CT IMAGING | Facility: HOSPITAL | Age: 43
End: 2023-12-15
Payer: COMMERCIAL

## 2023-12-15 ENCOUNTER — INPATIENT HOSPITAL (OUTPATIENT)
Dept: URBAN - METROPOLITAN AREA HOSPITAL 84 | Facility: HOSPITAL | Age: 43
End: 2023-12-15
Payer: COMMERCIAL

## 2023-12-15 DIAGNOSIS — K85.20 ALCOHOL INDUCED ACUTE PANCREATITIS WITHOUT NECROSIS OR INFEC: ICD-10-CM

## 2023-12-15 DIAGNOSIS — R94.5 ABNORMAL RESULTS OF LIVER FUNCTION STUDIES: ICD-10-CM

## 2023-12-15 DIAGNOSIS — K86.3 PSEUDOCYST OF PANCREAS: ICD-10-CM

## 2023-12-15 DIAGNOSIS — F10.10 ALCOHOL ABUSE, UNCOMPLICATED: ICD-10-CM

## 2023-12-15 DIAGNOSIS — D64.9 ANEMIA, UNSPECIFIED: ICD-10-CM

## 2023-12-15 LAB
GLUCOSE BLDC GLUCOMTR-MCNC: 101 MG/DL (ref 70–105)
GLUCOSE BLDC GLUCOMTR-MCNC: 108 MG/DL (ref 70–105)
GLUCOSE BLDC GLUCOMTR-MCNC: 123 MG/DL (ref 70–105)
GLUCOSE BLDC GLUCOMTR-MCNC: 144 MG/DL (ref 70–105)

## 2023-12-15 PROCEDURE — 82948 REAGENT STRIP/BLOOD GLUCOSE: CPT

## 2023-12-15 PROCEDURE — 25010000002 ENOXAPARIN PER 10 MG: Performed by: HOSPITALIST

## 2023-12-15 PROCEDURE — 25010000002 HYDROMORPHONE 1 MG/ML SOLUTION: Performed by: INTERNAL MEDICINE

## 2023-12-15 PROCEDURE — 74178 CT ABD&PLV WO CNTR FLWD CNTR: CPT

## 2023-12-15 PROCEDURE — 99222 1ST HOSP IP/OBS MODERATE 55: CPT

## 2023-12-15 PROCEDURE — 25010000002 ONDANSETRON PER 1 MG: Performed by: NURSE PRACTITIONER

## 2023-12-15 PROCEDURE — 99233 SBSQ HOSP IP/OBS HIGH 50: CPT | Performed by: NURSE PRACTITIONER

## 2023-12-15 PROCEDURE — 25510000001 IOPAMIDOL PER 1 ML: Performed by: STUDENT IN AN ORGANIZED HEALTH CARE EDUCATION/TRAINING PROGRAM

## 2023-12-15 PROCEDURE — 25010000002 LORAZEPAM PER 2 MG: Performed by: STUDENT IN AN ORGANIZED HEALTH CARE EDUCATION/TRAINING PROGRAM

## 2023-12-15 PROCEDURE — 25010000002 HYDROMORPHONE 1 MG/ML SOLUTION: Performed by: STUDENT IN AN ORGANIZED HEALTH CARE EDUCATION/TRAINING PROGRAM

## 2023-12-15 RX ORDER — ALPRAZOLAM 0.5 MG/1
0.5 TABLET ORAL 3 TIMES DAILY PRN
Status: DISCONTINUED | OUTPATIENT
Start: 2023-12-15 | End: 2023-12-17

## 2023-12-15 RX ORDER — LORAZEPAM 2 MG/ML
1 INJECTION INTRAMUSCULAR EVERY 4 HOURS PRN
Status: DISCONTINUED | OUTPATIENT
Start: 2023-12-15 | End: 2023-12-19 | Stop reason: HOSPADM

## 2023-12-15 RX ADMIN — ALPRAZOLAM 0.25 MG: 0.25 TABLET ORAL at 13:19

## 2023-12-15 RX ADMIN — LORAZEPAM 1 MG: 2 INJECTION INTRAMUSCULAR; INTRAVENOUS at 17:52

## 2023-12-15 RX ADMIN — ENOXAPARIN SODIUM 40 MG: 100 INJECTION SUBCUTANEOUS at 17:13

## 2023-12-15 RX ADMIN — DEXTROSE AND SODIUM CHLORIDE 100 ML/HR: 5; 450 INJECTION, SOLUTION INTRAVENOUS at 20:54

## 2023-12-15 RX ADMIN — PANCRELIPASE 12000 UNITS OF LIPASE: 60000; 12000; 38000 CAPSULE, DELAYED RELEASE PELLETS ORAL at 13:14

## 2023-12-15 RX ADMIN — LORAZEPAM 1 MG: 2 INJECTION INTRAMUSCULAR; INTRAVENOUS at 21:39

## 2023-12-15 RX ADMIN — HYDROMORPHONE HYDROCHLORIDE 0.5 MG: 1 INJECTION, SOLUTION INTRAMUSCULAR; INTRAVENOUS; SUBCUTANEOUS at 23:37

## 2023-12-15 RX ADMIN — ONDANSETRON 4 MG: 2 INJECTION INTRAMUSCULAR; INTRAVENOUS at 16:42

## 2023-12-15 RX ADMIN — HYDROMORPHONE HYDROCHLORIDE 0.5 MG: 1 INJECTION, SOLUTION INTRAMUSCULAR; INTRAVENOUS; SUBCUTANEOUS at 19:30

## 2023-12-15 RX ADMIN — ONDANSETRON 4 MG: 2 INJECTION INTRAMUSCULAR; INTRAVENOUS at 09:18

## 2023-12-15 RX ADMIN — HYDROMORPHONE HYDROCHLORIDE 2 MG: 2 TABLET ORAL at 04:53

## 2023-12-15 RX ADMIN — HYDROMORPHONE HYDROCHLORIDE 2 MG: 2 TABLET ORAL at 13:14

## 2023-12-15 RX ADMIN — HYDROMORPHONE HYDROCHLORIDE 1 MG: 1 INJECTION, SOLUTION INTRAMUSCULAR; INTRAVENOUS; SUBCUTANEOUS at 10:20

## 2023-12-15 RX ADMIN — HYDROMORPHONE HYDROCHLORIDE 0.5 MG: 1 INJECTION, SOLUTION INTRAMUSCULAR; INTRAVENOUS; SUBCUTANEOUS at 21:39

## 2023-12-15 RX ADMIN — HYDROMORPHONE HYDROCHLORIDE 2 MG: 2 TABLET ORAL at 09:22

## 2023-12-15 RX ADMIN — ONDANSETRON 4 MG: 2 INJECTION INTRAMUSCULAR; INTRAVENOUS at 21:38

## 2023-12-15 RX ADMIN — PANCRELIPASE 12000 UNITS OF LIPASE: 60000; 12000; 38000 CAPSULE, DELAYED RELEASE PELLETS ORAL at 09:22

## 2023-12-15 RX ADMIN — ONDANSETRON 4 MG: 2 INJECTION INTRAMUSCULAR; INTRAVENOUS at 04:19

## 2023-12-15 RX ADMIN — Medication 10 ML: at 20:54

## 2023-12-15 RX ADMIN — HYDROMORPHONE HYDROCHLORIDE 0.5 MG: 1 INJECTION, SOLUTION INTRAMUSCULAR; INTRAVENOUS; SUBCUTANEOUS at 17:14

## 2023-12-15 RX ADMIN — DEXTROSE AND SODIUM CHLORIDE 100 ML/HR: 5; 450 INJECTION, SOLUTION INTRAVENOUS at 07:37

## 2023-12-15 RX ADMIN — HYDROMORPHONE HYDROCHLORIDE 2 MG: 2 TABLET ORAL at 00:45

## 2023-12-15 RX ADMIN — IOPAMIDOL 100 ML: 755 INJECTION, SOLUTION INTRAVENOUS at 11:10

## 2023-12-15 NOTE — PROGRESS NOTES
[facility] MEDICINE SERVICE  DAILY PROGRESS NOTE    NAME: Dayron Manley  : 1980  MRN: [id number]      LOS: 14 days     PROVIDER OF SERVICE: Tyrone Dawson MD    Chief Complaint: Acute pancreatitis    Subjective:     Interval History:  History taken from: patient  Patient Complaints: Abdominal pain requested specifically IV Dilaudid; refused PO, states that states vomiting  Patient Denies: Chills or rigors    Review of Systems:   Review of Systems   Constitutional:  Positive for fever. Negative for chills.   Gastrointestinal:  Positive for abdominal pain. Negative for blood in stool and vomiting.       Objective:     Vital Signs  Temp:  [98.3 °F (36.8 °C)-98.9 °F (37.2 °C)] 98.6 °F (37 °C)  Heart Rate:  [68-97] 97  Resp:  [16-20] 16  BP: (128-138)/(90-93) 134/93   Body mass index is 17.34 kg/m².    Physical Exam  Physical Exam  Constitutional:       Comments: NJ tube in place   HENT:      Head: Atraumatic.      Mouth/Throat:      Mouth: Mucous membranes are moist.   Eyes:      Pupils: Pupils are equal, round, and reactive to light.   Cardiovascular:      Rate and Rhythm: Normal rate and regular rhythm.   Pulmonary:      Effort: Pulmonary effort is normal.      Breath sounds: Normal breath sounds.   Abdominal:      Palpations: Abdomen is soft.      Tenderness: There is abdominal tenderness. There is no guarding or rebound.   Musculoskeletal:         General: Normal range of motion.      Cervical back: Neck supple.   Skin:     General: Skin is warm.   Neurological:      General: No focal deficit present.      Mental Status: He is oriented to person, place, and time.   Psychiatric:         Mood and Affect: Mood normal.         Scheduled Meds   enoxaparin, 40 mg, Subcutaneous, Q24H  pancrelipase (Lip-Prot-Amyl), 12,000 units of lipase, Oral, TID With Meals  senna-docusate sodium, 2 tablet, Oral, BID   And  polyethylene glycol, 17 g, Oral, Daily  sodium chloride, 10 mL, Intravenous, Q12H       PRN Meds      ALPRAZolam    senna-docusate sodium **AND** polyethylene glycol **AND** bisacodyl **AND** bisacodyl    dextrose    dextrose    glucagon (human recombinant)    HYDROmorphone    ondansetron    sodium chloride    sodium chloride    sodium chloride   Infusions  dextrose 5 % and sodium chloride 0.45 %, 100 mL/hr, Last Rate: 100 mL/hr (12/15/23 0737)          Diagnostic Data    Results from last 7 days   Lab Units 12/14/23  0401   WBC 10*3/mm3 7.50   HEMOGLOBIN g/dL 13.3   HEMATOCRIT % 41.4   PLATELETS 10*3/mm3 414   GLUCOSE mg/dL 111*   CREATININE mg/dL 0.71*   BUN mg/dL 9   SODIUM mmol/L 137   POTASSIUM mmol/L 4.4   AST (SGOT) U/L 16   ALT (SGPT) U/L 14   ALK PHOS U/L 105   BILIRUBIN mg/dL 0.3   ANION GAP mmol/L 11.0       CT Abdomen Pelvis With & Without Contrast    Result Date: 12/15/2023  Impression: Increased size of large pancreatic pseudocyst which can is contiguous with a smaller pseudocyst which also have increased in size. Largely resolved peripancreatic inflammatory changes. Resolved pleural effusions. Decreased amount of ascites. Moderate gastric distention with mass effect on the distal stomach caused by the large pseudocyst. Electronically Signed: Lubna Max MD  12/15/2023 11:17 AM EST  Workstation ID: [id number]       I reviewed the patient's new clinical results.    Assessment/Plan:     Active and Resolved Problems  Acute pancreatitis likely alcohol induced  Pancreatic pseudocyst  Abdominal pain  -Lipase remains elevated but stable  - CT scan done on 12/15/2023 showed creasing the site of pseudocyst 10.2 x 8.6 cm on the   current study as compared to 4.8 x 5.7  -GI team has been reconsulted      CT done on 12/04/2023   showing acute pancreatitis with fluid collection within the pancreatic neck likely representing pseudocyst.  Recommend free fluid in the pelvis related to pancreatitis and tracking inflammatory changes.  Bowels not dilated  -His abdominal pain is better.  He is status post NG tube  placement.  -ESOPHAGOGASTRODUODENOSCOPY with NJ tube placement.  He is tolerating G-tube feeds.  GI to, decide about p.o. feeds and discontinuation of tube feeds.  Will continue IV fluids, continue pain control as ordered  -Replace electrolytes as needed  -Encouraged complete tobacco and alcohol cessation strongly recommended  -Per GI - We will continue nasojejunal tube feeds.  Continue full liquid diet.  Continue PPI and Creon.  Continue pain control with po hydromorphone  as needed  -Case management following for discharge planning to home with home health care    SINGH  Likely from volume depletion -improved    Constipation  - Likely from pain medication continue bowel regimen  MiraLAX and senna    Substance abuse  Anxiety related to medical condition  - Psychiatry team consult appreciated increased dose of Xanax; discontinued amitriptyline UDS was positive for cocaine and opiates on admission    DVT prophylaxis:  Medical DVT prophylaxis orders are present.     Code status is   Code Status and Medical Interventions:   Ordered at: 12/01/23 0051     Code Status (Patient has no pulse and is not breathing):    CPR (Attempt to Resuscitate)     Medical Interventions (Patient has pulse or is breathing):    Full Support     Discussed with the patient and mother at bedside and all relevant questions answered    Plan for disposition:pending clinical improvement    Time: 34 minutes    Signature: Electronically signed by Tyrone Dawson MD, 12/15/23, 15:13 EST.  [facility] Hospitalist Team

## 2023-12-15 NOTE — CASE MANAGEMENT/SOCIAL WORK
Continued Stay Note  [facility]     Patient Name: Dayron Manley  MRN: [id number]  Today's Date: 12/15/2023    Admit Date: 11/30/2023    Plan: Return home alone. Meds to Bed. [facility] for NJ tube feedings   Discharge Plan       Row Name 12/15/23 1137       Plan    Plan Comments Barriers: pt stating pain 10/10, psych eval pending, CT abd pending, pt refusing teaching from [facility] for home tube feeds.             Jody Herrera RN     Office phone: [phone]  Office fax: [phone]

## 2023-12-15 NOTE — PROGRESS NOTES
" LOS: 14 days   Patient Care Team:  Nancy El APRN as PCP - General (Family Medicine)      Subjective \" worsening abdominal pain over the last couple days\"    Interval History:     Subjective: Not tolerating clear liquids.  Tolerating tube feeds.  Increased abdominal pain.  Last bowel movement 2 days ago.    ROS:   No chest pain, shortness of breath, or cough.      Medication Review:     Current Facility-Administered Medications:     ALPRAZolam (XANAX) tablet 0.5 mg, 0.5 mg, Oral, TID PRN, Nataliia Damon, HECTOR, APRN    sennosides-docusate (PERICOLACE) 8.6-50 MG per tablet 2 tablet, 2 tablet, Oral, BID, 2 tablet at 12/12/23 2023 **AND** polyethylene glycol (MIRALAX) packet 17 g, 17 g, Oral, Daily, 17 g at 12/12/23 0854 **AND** bisacodyl (DULCOLAX) EC tablet 5 mg, 5 mg, Oral, Daily PRN **AND** bisacodyl (DULCOLAX) suppository 10 mg, 10 mg, Rectal, Daily PRN, Vargas Sotelo DO    dextrose (D50W) (25 g/50 mL) IV injection 25 g, 25 g, Intravenous, Q15 Min PRN, Vargas Sotelo DO, 25 g at 12/08/23 1850    dextrose (GLUTOSE) oral gel 15 g, 15 g, Oral, Q15 Min PRN, Vargas Sotelo DO    dextrose 5 % and sodium chloride 0.45 % infusion, 100 mL/hr, Intravenous, Continuous, Fabain Stratton MD, Last Rate: 100 mL/hr at 12/15/23 0737, 100 mL/hr at 12/15/23 0737    Enoxaparin Sodium (LOVENOX) syringe 40 mg, 40 mg, Subcutaneous, Q24H, Maile Purvis MD, 40 mg at 12/14/23 1627    glucagon (GLUCAGEN) injection 1 mg, 1 mg, Subcutaneous, Q15 Min PRN, Vargas Sotelo,     HYDROmorphone (DILAUDID) tablet 2 mg, 2 mg, Oral, Q4H PRN, Tyrone Dawson MD, 2 mg at 12/15/23 1314    ondansetron (ZOFRAN) injection 4 mg, 4 mg, Intravenous, Q6H PRN, Tracey Fernández APRN, 4 mg at 12/15/23 0918    pancrelipase (Lip-Prot-Amyl) (CREON) capsule 12,000 units of lipase, 12,000 units of lipase, Oral, TID With Meals, Noemí Espinoza, TARAN, 12,000 units of lipase at 12/15/23 1314    sodium chloride 0.9 % flush 10 mL, 10 mL, Intravenous, " JEFRY, Sarabjit Harris PA    sodium chloride 0.9 % flush 10 mL, 10 mL, Intravenous, Q12H, Maile Purvis MD, 10 mL at 12/14/23 2045    sodium chloride 0.9 % flush 10 mL, 10 mL, Intravenous, PRN, Maile Purvis MD, 10 mL at 12/11/23 0430    sodium chloride 0.9 % infusion 40 mL, 40 mL, Intravenous, PRNYaniv Margie U, MD, 100 mL/hr at 12/08/23 1511      Objective resting in bed, ill-appearing but no acute distress, mother at bedside    Vital Signs  Vitals:    12/14/23 2207 12/15/23 0222 12/15/23 0534 12/15/23 0600   BP: 138/91 128/90 134/93    BP Location: Right arm Right arm Right arm    Patient Position: Lying Lying Lying    Pulse: 83 68 97    Resp: 20 16 16    Temp: 98.3 °F (36.8 °C) 98.9 °F (37.2 °C) 98.6 °F (37 °C)    TempSrc: Axillary Oral Oral    SpO2: 98% 98% 95%    Weight:    58 kg (127 lb 13.9 oz)   Height:           Physical Exam:     General Appearance:    Awake and alert, in no acute distress   Head:    Normocephalic, without obvious abnormality   Eyes:          Conjunctivae normal, anicteric sclera   Throat:   No oral lesions, no thrush, oral mucosa moist, NG intact   Neck:   supple, no JVD   Lungs:     respirations regular, even and unlabored   Abdomen:     upper abdominal tenderness, nondistended   Rectal:     Deferred   Extremities:   No edema, no cyanosis   Skin:   No bruising or rash, no jaundice        Results Review:    CBC    Results from last 7 days   Lab Units 12/14/23  0401 12/13/23  0420 12/12/23  0523 12/11/23  0414 12/10/23  0632 12/09/23  0355   WBC 10*3/mm3 7.50 5.50 3.80 4.10 4.80 4.80   HEMOGLOBIN g/dL 13.3 11.8* 12.0* 11.2* 11.9* 11.3*   PLATELETS 10*3/mm3 414 385 414 367 409 433     CMP   Results from last 7 days   Lab Units 12/14/23  0401 12/13/23  0420 12/12/23  0523 12/11/23  0414 12/10/23  1151 12/10/23  0632 12/09/23  0355   SODIUM mmol/L 137 136 139 140  --  141 138   POTASSIUM mmol/L 4.4 4.2 4.0 4.2  --  3.9 3.8   CHLORIDE mmol/L 100 102 102 106  --  106 102   CO2 mmol/L  26.0 27.0 29.0 25.0  --  28.0 26.0   BUN mg/dL 9 10 10 6  --  3* 2*   CREATININE mg/dL 0.71* 0.74* 0.77 0.65*  --  0.76 0.77   GLUCOSE mg/dL 111* 126* 89 111*  --  102* 112*   ALBUMIN g/dL 4.0 3.7 3.7 3.2*  --  3.2* 3.3*   BILIRUBIN mg/dL 0.3 0.3 0.3 0.3  --  0.3 0.3   ALK PHOS U/L 105 97 104 90  --  102 100   AST (SGOT) U/L 16 15 14 11  --  20 15   ALT (SGPT) U/L 14 13 13 11  --  19 14   MAGNESIUM mg/dL  --   --   --   --   --  2.3  --    PHOSPHORUS mg/dL  --   --   --   --   --  2.9 3.1   LIPASE U/L 1,604* 1,262* 1,379* 1,232* 1,359*  --  1,195*     Cr Clearance Estimated Creatinine Clearance: 111.2 mL/min (A) (by C-G formula based on SCr of 0.71 mg/dL (L)).  Coag     HbA1C   Lab Results   Component Value Date    HGBA1C 5.10 11/30/2023     Blood Glucose   Glucose   Date/Time Value Ref Range Status   12/15/2023 1300 101 70 - 105 mg/dL Final     Comment:     Serial Number: [id number]:  [id number]   12/15/2023 0632 144 (H) 70 - 105 mg/dL Final     Comment:     Serial Number: [id number]:  [id number]   12/15/2023 0030 123 (H) 70 - 105 mg/dL Final     Comment:     Serial Number: [id number]:  [id number]   12/14/2023 1833 138 (H) 70 - 105 mg/dL Final     Comment:     Serial Number: [id number]:  [id number]   12/14/2023 1153 120 (H) 70 - 105 mg/dL Final     Comment:     Serial Number: [id number]:  [id number]   12/14/2023 0845 117 (H) 70 - 105 mg/dL Final     Comment:     Serial Number: [id number]:  [id number]   12/14/2023 0007 101 70 - 105 mg/dL Final     Comment:     Serial Number: [id number]:  [id number]   12/13/2023 1810 124 (H) 70 - 105 mg/dL Final     Comment:     Serial Number: [id number]:  [id number]     Infection     UA      Radiology(recent) CT Abdomen Pelvis With & Without Contrast    Result Date: 12/15/2023  Impression: Increased size of large pancreatic pseudocyst which can is contiguous with a smaller pseudocyst which also have increased in size. Largely resolved  peripancreatic inflammatory changes. Resolved pleural effusions. Decreased amount of ascites. Moderate gastric distention with mass effect on the distal stomach caused by the large pseudocyst. Electronically Signed: Lubna Max MD  12/15/2023 11:17 AM EST  Workstation ID: [id number]        Assessment & Plan   Acute pancreatitis -alcohol induced  Pancreatic pseudocysts  Elevated LFTs -normalized  Alcohol abuse  Abnormal drug screen -positive cocaine and opiates  Mild normocytic anemia     12/8/2023 EGD (Dr. Mendoza) successful NJ tube placement.  Retained fluid in the stomach with minimal changes of gastritis.     PLAN:  Readmitted with abdominal pain.  No alcohol use since previous hospitalization but continues to smoke.  Ultrasound without cholelithiasis with CBD 10 mm.  Triglycerides normal.  Imaging suggesting developing pseudocyst with repeat imaging today suggesting enlarging cyst with compression of the stomach.  He has been tolerating tube feeds until it clogged this afternoon.  Plan repeat EGD with NJ replacement tomorrow.  Will likely need discharge home with NJ feeds.  Strict n.p.o. with antiemetics and analgesics as needed.  Unfortunately, pseudocysts are not mature enough for Axios stent placement/drainage.  No evidence of infection.  Continue supportive care.    Electronically signed by TARAN Hugo, 12/15/23, 3:52 PM EST.

## 2023-12-15 NOTE — CONSULTS
Referring Provider: Tyrone Dawson MD   Reason for Consultation: AVNI vs maladaptation     Chief complaint : Pain, anxiety     Subjective .     History of present illness:  The patient is a 43 y.o. male who was admitted secondary to abdominal pain. Diagnosed pancreatitis secondary to alcohol.   Medical hx includes alcohol and opioid abuse.   Was sober for 10 years but drank on [birth year] for his birthday per chart review.   Pt oriented x4  Mother at bedside   Pt reports 10/10 pain in abdominal area   Pt gave permission to talk with mother due to pain  Pt mother reports pt is in recovery for opioid and alcohol addiction.   She is not aware of any length of sobriety pt has maintained.  Previous inpatient treatment programs  Hx of depression and anxiety related to substance use.   Denies previous psychiatric hospitalizations   Denies symptoms of psychosis, oumou, hypomania  UDS positive for cocaine   Interview limited       Review of Systems   Review of systems could not be obtained due to   10/10 pain     The following portions of the patient's history were reviewed and updated as appropriate: allergies, current medications, past family history, past medical history, past social history, past surgical history and problem list.    History    Past psychiatric history     History reviewed. No pertinent past medical history.     History reviewed. No pertinent family history.     Social History     Tobacco Use    Smoking status: Every Day     Packs/day: 0.50     Years: 15.00     Additional pack years: 0.00     Total pack years: 7.50     Types: Cigarettes    Smokeless tobacco: Never   Vaping Use    Vaping Use: Never used   Substance Use Topics    Alcohol use: Not Currently    Drug use: Not Currently          Medications Prior to Admission   Medication Sig Dispense Refill Last Dose    indomethacin (INDOCIN) 50 MG capsule Take 1 capsule by mouth 3 (Three) Times a Day As Needed (pain). 30 capsule 0     ondansetron (ZOFRAN) 4 MG  "tablet Take 1 tablet by mouth Every 6 (Six) Hours As Needed for Nausea or Vomiting. 60 tablet 0     oxyCODONE-acetaminophen (Percocet) 5-325 MG per tablet Take 1 tablet by mouth Every 6 (Six) Hours As Needed for Moderate Pain. 14 tablet 0     pantoprazole (PROTONIX) 40 MG EC tablet Take 1 tablet by mouth Every Morning. 30 tablet 0     polyethylene glycol (MIRALAX) 17 g packet Take 17 g by mouth Daily As Needed (Use if senna-docusate is ineffective). 30 each 0     sennosides-docusate (PERICOLACE) 8.6-50 MG per tablet Take 2 tablets by mouth 2 (Two) Times a Day. 30 tablet 0         Scheduled Meds:  amitriptyline, 25 mg, Oral, Nightly  enoxaparin, 40 mg, Subcutaneous, Q24H  pancrelipase (Lip-Prot-Amyl), 12,000 units of lipase, Oral, TID With Meals  senna-docusate sodium, 2 tablet, Oral, BID   And  polyethylene glycol, 17 g, Oral, Daily  sodium chloride, 10 mL, Intravenous, Q12H         Continuous Infusions:  dextrose 5 % and sodium chloride 0.45 %, 100 mL/hr, Last Rate: 100 mL/hr (12/15/23 0737)        PRN Meds:    ALPRAZolam    senna-docusate sodium **AND** polyethylene glycol **AND** bisacodyl **AND** bisacodyl    dextrose    dextrose    glucagon (human recombinant)    HYDROmorphone    ondansetron    sodium chloride    sodium chloride    sodium chloride      Allergies:  Patient has no known allergies.      Objective     Vital Signs   /93 (BP Location: Right arm, Patient Position: Lying)   Pulse 97   Temp 98.6 °F (37 °C) (Oral)   Resp 16   Ht 182.9 cm (72.01\")   Wt 58 kg (127 lb 13.9 oz)   SpO2 95%   BMI 17.34 kg/m²     Physical Exam:    Muscle strength and tone: moderate, ue's, equal bilaterally  Abnormal Movements: guarding   Gait: brandon      General Appearance:    Upright on edge of bed, holding abdomen        Mental Status Exam:   Hygiene:   good  Cooperation:   unable to cooperate   Eye Contact:  Downcast  Psychomotor Behavior:  Aggitated  Affect:  Restricted  Mood: anxious  Speech:  Minimal  Thought " Process:  Goal directed and Linear  Thought Content:  Mood congruent  Suicidal:  None  Homicidal:  None  Hallucinations:  None  Delusion:  None  Memory:  Intact  Orientation:  Person, Place, Time, and Situation  Reliability:   brandon  Insight:   brandon  Judgement:   brandon  Impulse Control:  Fair  Physical/Medical Issues:  Yes          Medications and allergies reviewed     Result Review:  I have personally reviewed the results from the time of this admission to 12/15/2023 13:24 EST and agree with these findings:  [x]  Laboratory  []  Microbiology  []  Radiology  [x]  EKG/Telemetry   []  Cardiology/Vascular   []  Pathology  []  Old records  []  Other:      Assessment & Plan       Acute pancreatitis       Assessment: Substance abuse, anxiety related to medical condition   Treatment Plan: Psychiatry consulted to evaluate for anxiety.   Pt anxious related to pain and medical condition.   Severe pain 10/10   History of substance abuse   UDS positive for cocaine and opioids on admission   Per chart review pt having issues with constipation amitriptyline has anticholinergic activity, risk of ileus with this med and pain medications will dc    Will increase xanax  0.5 mg TID to help with anxiety  Pt would likely benefit from CD treatment if agreeable   Interview limited due to pain   Will continue to provide support   Will follow     Treatment Plan discussed with: Patient and Family        I discussed the patients findings and my recommendations with patient and nursing staff    I have reviewed and approved the behavioral health treatment plans and problem list. Yes  Thank you for the consult   Referring MD has access to consult report and progress notes in EMR     Nataliia Damon DNP, APRN  12/15/23  13:24 EST

## 2023-12-15 NOTE — PROGRESS NOTES
Nutrition Services    Patient Name: Dayron Manley  YOB: 1980  MRN: [id number]  Admission date: 11/30/2023    *See [facility] data at bottom of this note.     Severe chronic Dz related malnutrition R/t insufficient intakes in the setting of ongoing pancreatitis/GI distress; as evidenced by intakes meeting less than 75% estimated needs in the last month, with associated weight loss greater than 5%, and severe muscle and fat loss on exam.     Monitor for readiness to re-start TF after replacement of tube.     When ready to resume, can continue with Isosource 1.5, as previously infusing.     CLINICAL NUTRITION ASSESSMENT      Reason for Assessment Follow up protocol   12/8: TF consult  12/2: BMI < 19     H&P      History reviewed. No pertinent past medical history.    Past Surgical History:   Procedure Laterality Date    ENDOSCOPY N/A 12/8/2023    Procedure: ESOPHAGOGASTRODUODENOSCOPY with NJ tube placement;  Surgeon: Luan Mendoza MD;  Location: [facility] ENDOSCOPY;  Service: Gastroenterology;  Laterality: N/A;        Current Problems   SINGH - resolved    UTI    Constipation    Acute pancreatitis  -GI following  -NJ with TF to continue as outpatient        Encounter Information        Trending Narrative     12/15: Pt assessed for follow up. Spoke with pt's mother at bedside also, who reports pt's tube is now clogged and will need to be replaced. Representative from [facility] has been by and provided education for outpatient enteral nutrition support. All questions answered at this time. Pt has had some challenges with pain control and is again requiring IV pain medications; will monitor plan/clinical course. Do not suspect TF exacerbating pain, as infusion is jejunal and less likely to cause discomfort. Will continue to monitor as TF resumes. Regarding NFPE - pt has lost additional weight since prior assessments, and more wasting is now visible/palpable. This may have been masked by fluid previously, and/or  "positioning of pt. At this point, pt does meet criteria for malnutrition.     12/8: Pt being reassessed for follow up. Noted pt with increasing pain after diet was advanced to low fat. Pt now NPO with plan for EGD with NJ placement + TF initiation. Pt in ENDO, unable to complete NFPE. Noted plan to wean pt's narcotics and also increase stool softeners once procedure is done today.    12/2: Pt being assessed for BMI < 19. Pt admitted to [facility] with acute pancreatitis. Pt reported he quit excessively drinking about 10 years ago and only drinks on rare occasions at this time. CT AP showed acute pancreatitis with new fluid collection seen within the pancreatic neck and body likely representing pseudocyst. RD visited pt at bedside. Pt reported he generally eats whatever he wants and a good quantity of food,. When he starts to have abdominal pain suggestive of an acute pancreatitis flare up he will fast for a couple of days which causes symptoms to subside. Once symptoms have resolved he returns to eating whatever he wants. Pt does drink an ONS at home although he is unsure what the brand is. Pt also unsure of calorie/protein content. Pt agreeable to Boost Breeze while on clear liquid diet. RD provided pt with Boost Breeze Peach at time of visit. Pt reports UBW is 170#. RD noted that bed scale wt at time of visit was 141# but currently recorded wt is 123#. Pt reported he had just gotten back to working out 7 days a week prior to pancreatitis flare up. NFPE completed and not consistent with nutrition diagnosis of malnutrition at this time using AND/ASPEN criteria       Anthropometrics        Current Height, Weight Height: 182.9 cm (72.01\")  Weight: 58 kg (127 lb 13.9 oz) (12/15/23 0600)       Usual Body Weight (UBW) 170#       Trending Weight Hx     This admission: 12/15: 10.1% loss in one month   12/8: no new wt, RD ordered wt 12/8 12/2: 123#             PTA: 16% wt loss x 1 month, question accuracy. Net fluid gain of >3L " since admission per I/O documentation.     Wt Readings from Last 30 Encounters:   12/15/23 0600 58 kg (127 lb 13.9 oz)   12/13/23 0243 57.4 kg (126 lb 8.7 oz)   12/12/23 0552 57.7 kg (127 lb 3.3 oz)   12/11/23 0500 60.1 kg (132 lb 7.9 oz)   12/10/23 0609 63 kg (138 lb 14.2 oz)   12/09/23 1027 62.6 kg (138 lb 0.1 oz)   12/09/23 0633 60 kg (132 lb 4.4 oz)   12/08/23 1506 60 kg (132 lb 4.4 oz)   12/08/23 1027 58 kg (127 lb 13.9 oz)   12/01/23 0134 55.9 kg (123 lb 3.8 oz)   11/30/23 2246 61.2 kg (134 lb 14.7 oz)   11/29/23 1148 68 kg (150 lb)   11/23/23 0543 68.9 kg (151 lb 14.4 oz)   11/22/23 0500 68.5 kg (151 lb 0.2 oz)   11/21/23 2311 68.5 kg (151 lb 0.2 oz)   11/21/23 0542 66.6 kg (146 lb 13.2 oz)   11/20/23 0500 64.5 kg (142 lb 3.2 oz)   11/19/23 0822 68 kg (150 lb)      BMI kg/m2 Body mass index is 17.34 kg/m².       Labs        Pertinent Labs    Results from last 7 days   Lab Units 12/14/23  0401 12/13/23  0420 12/12/23  0523   SODIUM mmol/L 137 136 139   POTASSIUM mmol/L 4.4 4.2 4.0   CHLORIDE mmol/L 100 102 102   CO2 mmol/L 26.0 27.0 29.0   BUN mg/dL 9 10 10   CREATININE mg/dL 0.71* 0.74* 0.77   CALCIUM mg/dL 9.5 9.3 9.4   BILIRUBIN mg/dL 0.3 0.3 0.3   ALK PHOS U/L 105 97 104   ALT (SGPT) U/L 14 13 13   AST (SGOT) U/L 16 15 14   GLUCOSE mg/dL 111* 126* 89     Results from last 7 days   Lab Units 12/14/23  0401 12/11/23  0414 12/10/23  0632   MAGNESIUM mg/dL  --   --  2.3   PHOSPHORUS mg/dL  --   --  2.9   HEMOGLOBIN g/dL 13.3   < > 11.9*   HEMATOCRIT % 41.4   < > 36.2*    < > = values in this interval not displayed.     Lab Results   Component Value Date    HGBA1C 5.10 11/30/2023        Medications    Scheduled Medications enoxaparin, 40 mg, Subcutaneous, Q24H  pancrelipase (Lip-Prot-Amyl), 12,000 units of lipase, Oral, TID With Meals  senna-docusate sodium, 2 tablet, Oral, BID   And  polyethylene glycol, 17 g, Oral, Daily  sodium chloride, 10 mL, Intravenous, Q12H        Infusions dextrose 5 % and sodium  chloride 0.45 %, 100 mL/hr, Last Rate: 100 mL/hr (12/15/23 0737)        PRN Medications   ALPRAZolam    senna-docusate sodium **AND** polyethylene glycol **AND** bisacodyl **AND** bisacodyl    dextrose    dextrose    glucagon (human recombinant)    HYDROmorphone    ondansetron    sodium chloride    sodium chloride    sodium chloride     Physical Findings        Trending Physical   Appearance, NFPE 12/15: NFPE completed, consistent with nutrition diagnosis of malnutrition using AND/ASPEN criteria. See MSA below. --- suspect wasting was masked by fluid accumulation on last NFPE - pt is now down 9.4L since last assessment     12/8: KAYLYN    12/2: NFPE completed and not consistent with nutrition diagnosis of malnutrition at this time using AND/ASPEN criteria      --  Edema  None documented      Bowel Function + BM on 12/5     Tubes None, plan for NJ placement      Chewing/Swallowing No difficulty      Skin Intact        Estimated/Assessed Needs    Estimated 12/8 - remains current 12/15   Energy Requirements    EST Needs, Method, Wt used 8873-6580 kcal/day (25-30 kcal/kg IBW, 80.9 kg)       Protein Requirements    EST Needs, Method, Wt used 97 g/day (1.2 g/kg IBW)       Fluid Requirements     Estimated Needs (mL/day) 1 mL/kcal or per MD     Current Nutrition Orders & Evaluation of Intake       Oral Nutrition     Food Allergies NKFA   Current PO Diet NPO Diet NPO Type: Strict NPO   Supplement -   PO Evaluation     Trending % PO Intake 12/15: Remains NPO  12/8: NPO now, minimal nutrition with nature of liquid diets and frequent NPO     Enteral Nutrition    Enteral Route Has current NJ but it is clogged; new one to be placed    Order, Modulars, Flushes Isosource 1.5 @ 70 mL/hr with 10 mL q2 FWF    Residual/Tolerance J-tube -- no residual checks; was tolerating while infusing   TF Observation  On hold due to clogged tube        Parenteral Nutrition     TPN Route    Total # Days on TPN    TPN Order, Lipid Details    MVI & Trace  Element Freq    TPN Observation         Nutrition Course Details    PO Diets: Back and forth progression and degression of diet: NPO, clear liquids, full liquids, and low fat diet as of 12/8; since then has remained on full liquids or clears with majority of nutrition via NJ  12/15: NPO for procedure (replacement of tube)    Nutrition Support: Plan for TF initiation 12/8  12/15: NJ clogged; replacement pending      Nutritional Risk Screening        NRS-2002 Score          Nutrition Diagnosis         Nutrition Dx Problem 1 Inadequate energy intake related to clinical course as evidenced by frequent NPO status and liquid diets this admission, with need for enteral nutrition support to meet needs.      Nutrition Dx Problem 2 Severe chronic Dz related malnutrition R/t insufficient intakes in the setting of ongoing pancreatitis/GI distress; as evidenced by intakes meeting less than 75% estimated needs in the last month, with associated weight loss greater than 5%, and severe muscle and fat loss on exam.        Intervention Goal         Intervention Goal(s) TF resumes with tolerance     Nutrition Intervention        RD Action Will monitor plan for TF and plan to resume when new tube placed     Nutrition Prescription          Diet Prescription NPO   Supplement Prescription -     Enteral Prescription Initial Goal:  *initial goal conservative d/t risk of RFS    Isosource 1.5 at 30 mL/hr + 10 mL q2 water flush      End Goal:  Isosource 1.5 at 70 mL/hr + water flush per clinical course     Calories  2310 kcals (within range)    Protein  105 g (108%)    Free water  1170 mL    Flushes  Will adjust per clinical picture      The above end goal rate is for 22 hrs/day to assume interruptions for ADLs. Water flushes adjusted based on clinical picture + Rx flushes/IV fluids        TPN Prescription      Monitor/Evaluation        Monitor Per protocol, PO intake, Pertinent labs, EN delivery/tolerance, Weight, GI status     Malnutrition  Severity Assessment      Patient meets criteria for : Severe Malnutrition  Malnutrition Type (last 8 hours)       Malnutrition Severity Assessment       Row Name 12/16/23 1533       Malnutrition Severity Assessment    Malnutrition Type Chronic Disease - Related Malnutrition      Row Name 12/16/23 1533       Insufficient Energy Intake     Insufficient Energy Intake Findings Severe    Insufficient Energy Intake  <75% of est. energy requirement for > or equal to 1 month      Row Name 12/16/23 1533       Unintentional Weight Loss     Unintentional Weight Loss Findings Severe    Unintentional Weight Loss  Weight loss greater than 5% in one month      Row Name 12/16/23 1533       Muscle Loss    Loss of Muscle Mass Findings Severe    [place] Region Severe - deep hollowing/scooping, lack of muscle to touch, facial bones well defined    Clavicle Bone Region Severe - protruding prominent bone    Acromion Bone Region Severe - squared shoulders, bones, and acromion process protrusion prominent    Patellar Region Severe - prominent bone, square looking, very little muscle definition    Posterior Calf Region Severe - thin with very little definition/firmness      Row Name 12/16/23 1533       Fat Loss    Subcutaneous Fat Loss Findings Severe    Orbital Region  Severe - pronounced hollowness/depression, dark circles, loose saggy skin      Row Name 12/16/23 1533       Criteria Met (Must meet criteria for severity in at least 2 of these categories: M Wasting, Fat Loss, Fluid, Secondary Signs, Wt. Status, Intake)    Patient meets criteria for  Severe Malnutrition                       Electronically signed by:  Suma Whitaker RD  12/15/23 16:38 EST

## 2023-12-16 ENCOUNTER — INPATIENT HOSPITAL (OUTPATIENT)
Dept: URBAN - METROPOLITAN AREA HOSPITAL 84 | Facility: HOSPITAL | Age: 43
End: 2023-12-16
Payer: COMMERCIAL

## 2023-12-16 ENCOUNTER — APPOINTMENT (OUTPATIENT)
Dept: GENERAL RADIOLOGY | Facility: HOSPITAL | Age: 43
End: 2023-12-16
Payer: COMMERCIAL

## 2023-12-16 ENCOUNTER — ANESTHESIA EVENT (OUTPATIENT)
Dept: GASTROENTEROLOGY | Facility: HOSPITAL | Age: 43
End: 2023-12-16
Payer: COMMERCIAL

## 2023-12-16 ENCOUNTER — ANESTHESIA (OUTPATIENT)
Dept: GASTROENTEROLOGY | Facility: HOSPITAL | Age: 43
End: 2023-12-16
Payer: COMMERCIAL

## 2023-12-16 DIAGNOSIS — R63.30 FEEDING DIFFICULTIES, UNSPECIFIED: ICD-10-CM

## 2023-12-16 DIAGNOSIS — K85.20 ALCOHOL INDUCED ACUTE PANCREATITIS WITHOUT NECROSIS OR INFEC: ICD-10-CM

## 2023-12-16 LAB
ALBUMIN SERPL-MCNC: 3.9 G/DL (ref 3.5–5.2)
ALBUMIN/GLOB SERPL: 1.6 G/DL
ALP SERPL-CCNC: 95 U/L (ref 39–117)
ALT SERPL W P-5'-P-CCNC: 20 U/L (ref 1–41)
ANION GAP SERPL CALCULATED.3IONS-SCNC: 12 MMOL/L (ref 5–15)
AST SERPL-CCNC: 26 U/L (ref 1–40)
BASOPHILS # BLD AUTO: 0 10*3/MM3 (ref 0–0.2)
BASOPHILS NFR BLD AUTO: 0.4 % (ref 0–1.5)
BILIRUB SERPL-MCNC: 0.4 MG/DL (ref 0–1.2)
BUN SERPL-MCNC: 13 MG/DL (ref 6–20)
BUN/CREAT SERPL: 16.9 (ref 7–25)
CALCIUM SPEC-SCNC: 9.4 MG/DL (ref 8.6–10.5)
CHLORIDE SERPL-SCNC: 98 MMOL/L (ref 98–107)
CO2 SERPL-SCNC: 26 MMOL/L (ref 22–29)
CREAT SERPL-MCNC: 0.77 MG/DL (ref 0.76–1.27)
CRP SERPL-MCNC: 1.31 MG/DL (ref 0–0.5)
DEPRECATED RDW RBC AUTO: 38.9 FL (ref 37–54)
EGFRCR SERPLBLD CKD-EPI 2021: 114.6 ML/MIN/1.73
EOSINOPHIL # BLD AUTO: 0.4 10*3/MM3 (ref 0–0.4)
EOSINOPHIL NFR BLD AUTO: 5.2 % (ref 0.3–6.2)
ERYTHROCYTE [DISTWIDTH] IN BLOOD BY AUTOMATED COUNT: 13.3 % (ref 12.3–15.4)
GLOBULIN UR ELPH-MCNC: 2.5 GM/DL
GLUCOSE BLDC GLUCOMTR-MCNC: 107 MG/DL (ref 70–105)
GLUCOSE BLDC GLUCOMTR-MCNC: 86 MG/DL (ref 70–105)
GLUCOSE BLDC GLUCOMTR-MCNC: 99 MG/DL (ref 70–105)
GLUCOSE SERPL-MCNC: 107 MG/DL (ref 65–99)
HCT VFR BLD AUTO: 41.6 % (ref 37.5–51)
HGB BLD-MCNC: 13.5 G/DL (ref 13–17.7)
LYMPHOCYTES # BLD AUTO: 0.8 10*3/MM3 (ref 0.7–3.1)
LYMPHOCYTES NFR BLD AUTO: 10.6 % (ref 19.6–45.3)
MAGNESIUM SERPL-MCNC: 2 MG/DL (ref 1.6–2.6)
MCH RBC QN AUTO: 27 PG (ref 26.6–33)
MCHC RBC AUTO-ENTMCNC: 32.4 G/DL (ref 31.5–35.7)
MCV RBC AUTO: 83.3 FL (ref 79–97)
MONOCYTES # BLD AUTO: 0.7 10*3/MM3 (ref 0.1–0.9)
MONOCYTES NFR BLD AUTO: 8.8 % (ref 5–12)
NEUTROPHILS NFR BLD AUTO: 5.9 10*3/MM3 (ref 1.7–7)
NEUTROPHILS NFR BLD AUTO: 75 % (ref 42.7–76)
NRBC BLD AUTO-RTO: 0.1 /100 WBC (ref 0–0.2)
PHOSPHATE SERPL-MCNC: 4.4 MG/DL (ref 2.5–4.5)
PLATELET # BLD AUTO: 382 10*3/MM3 (ref 140–450)
PMV BLD AUTO: 8.7 FL (ref 6–12)
POTASSIUM SERPL-SCNC: 4.3 MMOL/L (ref 3.5–5.2)
PROT SERPL-MCNC: 6.4 G/DL (ref 6–8.5)
RBC # BLD AUTO: 4.99 10*6/MM3 (ref 4.14–5.8)
SODIUM SERPL-SCNC: 136 MMOL/L (ref 136–145)
WBC NRBC COR # BLD AUTO: 7.8 10*3/MM3 (ref 3.4–10.8)

## 2023-12-16 PROCEDURE — 25010000002 PROPOFOL 200 MG/20ML EMULSION: Performed by: NURSE ANESTHETIST, CERTIFIED REGISTERED

## 2023-12-16 PROCEDURE — 25010000002 LORAZEPAM PER 2 MG: Performed by: STUDENT IN AN ORGANIZED HEALTH CARE EDUCATION/TRAINING PROGRAM

## 2023-12-16 PROCEDURE — 82948 REAGENT STRIP/BLOOD GLUCOSE: CPT

## 2023-12-16 PROCEDURE — 0DHA7UZ INSERTION OF FEEDING DEVICE INTO JEJUNUM, VIA NATURAL OR ARTIFICIAL OPENING: ICD-10-PCS | Performed by: INTERNAL MEDICINE

## 2023-12-16 PROCEDURE — 25010000002 ENOXAPARIN PER 10 MG: Performed by: HOSPITALIST

## 2023-12-16 PROCEDURE — 85025 COMPLETE CBC W/AUTO DIFF WBC: CPT | Performed by: STUDENT IN AN ORGANIZED HEALTH CARE EDUCATION/TRAINING PROGRAM

## 2023-12-16 PROCEDURE — 25010000002 FENTANYL CITRATE (PF) 100 MCG/2ML SOLUTION: Performed by: NURSE ANESTHETIST, CERTIFIED REGISTERED

## 2023-12-16 PROCEDURE — 25010000002 ONDANSETRON PER 1 MG: Performed by: NURSE PRACTITIONER

## 2023-12-16 PROCEDURE — 25010000002 HYDROMORPHONE 1 MG/ML SOLUTION: Performed by: STUDENT IN AN ORGANIZED HEALTH CARE EDUCATION/TRAINING PROGRAM

## 2023-12-16 PROCEDURE — 76000 FLUOROSCOPY <1 HR PHYS/QHP: CPT

## 2023-12-16 PROCEDURE — 80053 COMPREHEN METABOLIC PANEL: CPT | Performed by: NURSE PRACTITIONER

## 2023-12-16 PROCEDURE — 44372 SMALL BOWEL ENDOSCOPY: CPT | Performed by: INTERNAL MEDICINE

## 2023-12-16 PROCEDURE — 84100 ASSAY OF PHOSPHORUS: CPT | Performed by: STUDENT IN AN ORGANIZED HEALTH CARE EDUCATION/TRAINING PROGRAM

## 2023-12-16 PROCEDURE — 25810000003 SODIUM CHLORIDE 0.9 % SOLUTION: Performed by: NURSE ANESTHETIST, CERTIFIED REGISTERED

## 2023-12-16 PROCEDURE — 83735 ASSAY OF MAGNESIUM: CPT | Performed by: STUDENT IN AN ORGANIZED HEALTH CARE EDUCATION/TRAINING PROGRAM

## 2023-12-16 PROCEDURE — 86140 C-REACTIVE PROTEIN: CPT | Performed by: NURSE PRACTITIONER

## 2023-12-16 PROCEDURE — 25010000002 HYDROMORPHONE 1 MG/ML SOLUTION: Performed by: NURSE ANESTHETIST, CERTIFIED REGISTERED

## 2023-12-16 PROCEDURE — 25010000002 HYDROMORPHONE 1 MG/ML SOLUTION: Performed by: INTERNAL MEDICINE

## 2023-12-16 PROCEDURE — 0DHA3UZ INSERTION OF FEEDING DEVICE INTO JEJUNUM, PERCUTANEOUS APPROACH: ICD-10-PCS | Performed by: INTERNAL MEDICINE

## 2023-12-16 RX ORDER — PANTOPRAZOLE SODIUM 40 MG/1
40 TABLET, DELAYED RELEASE ORAL
Status: DISCONTINUED | OUTPATIENT
Start: 2023-12-17 | End: 2023-12-16

## 2023-12-16 RX ORDER — PROPOFOL 10 MG/ML
INJECTION, EMULSION INTRAVENOUS AS NEEDED
Status: DISCONTINUED | OUTPATIENT
Start: 2023-12-16 | End: 2023-12-16 | Stop reason: SURG

## 2023-12-16 RX ORDER — LABETALOL HYDROCHLORIDE 5 MG/ML
5 INJECTION, SOLUTION INTRAVENOUS
Status: DISCONTINUED | OUTPATIENT
Start: 2023-12-16 | End: 2023-12-16 | Stop reason: HOSPADM

## 2023-12-16 RX ORDER — ONDANSETRON 2 MG/ML
4 INJECTION INTRAMUSCULAR; INTRAVENOUS ONCE AS NEEDED
Status: DISCONTINUED | OUTPATIENT
Start: 2023-12-16 | End: 2023-12-16 | Stop reason: HOSPADM

## 2023-12-16 RX ORDER — FENTANYL CITRATE 50 UG/ML
INJECTION, SOLUTION INTRAMUSCULAR; INTRAVENOUS AS NEEDED
Status: DISCONTINUED | OUTPATIENT
Start: 2023-12-16 | End: 2023-12-16 | Stop reason: SURG

## 2023-12-16 RX ORDER — DIPHENHYDRAMINE HYDROCHLORIDE 50 MG/ML
12.5 INJECTION INTRAMUSCULAR; INTRAVENOUS
Status: DISCONTINUED | OUTPATIENT
Start: 2023-12-16 | End: 2023-12-16 | Stop reason: HOSPADM

## 2023-12-16 RX ORDER — PANTOPRAZOLE SODIUM 40 MG/10ML
40 INJECTION, POWDER, LYOPHILIZED, FOR SOLUTION INTRAVENOUS
Status: DISCONTINUED | OUTPATIENT
Start: 2023-12-17 | End: 2023-12-16

## 2023-12-16 RX ORDER — SODIUM CHLORIDE 9 MG/ML
INJECTION, SOLUTION INTRAVENOUS CONTINUOUS PRN
Status: DISCONTINUED | OUTPATIENT
Start: 2023-12-16 | End: 2023-12-16 | Stop reason: SURG

## 2023-12-16 RX ORDER — EPHEDRINE SULFATE 5 MG/ML
5 INJECTION INTRAVENOUS ONCE AS NEEDED
Status: DISCONTINUED | OUTPATIENT
Start: 2023-12-16 | End: 2023-12-16 | Stop reason: HOSPADM

## 2023-12-16 RX ORDER — HYDRALAZINE HYDROCHLORIDE 20 MG/ML
5 INJECTION INTRAMUSCULAR; INTRAVENOUS
Status: DISCONTINUED | OUTPATIENT
Start: 2023-12-16 | End: 2023-12-16 | Stop reason: HOSPADM

## 2023-12-16 RX ORDER — DIPHENHYDRAMINE HYDROCHLORIDE 50 MG/ML
12.5 INJECTION INTRAMUSCULAR; INTRAVENOUS ONCE AS NEEDED
Status: DISCONTINUED | OUTPATIENT
Start: 2023-12-16 | End: 2023-12-16 | Stop reason: HOSPADM

## 2023-12-16 RX ORDER — OXYCODONE HYDROCHLORIDE 5 MG/1
10 TABLET ORAL EVERY 4 HOURS PRN
Status: DISCONTINUED | OUTPATIENT
Start: 2023-12-16 | End: 2023-12-16 | Stop reason: HOSPADM

## 2023-12-16 RX ORDER — PROPOFOL 10 MG/ML
INJECTION, EMULSION INTRAVENOUS CONTINUOUS PRN
Status: DISCONTINUED | OUTPATIENT
Start: 2023-12-16 | End: 2023-12-16 | Stop reason: SURG

## 2023-12-16 RX ORDER — IPRATROPIUM BROMIDE AND ALBUTEROL SULFATE 2.5; .5 MG/3ML; MG/3ML
3 SOLUTION RESPIRATORY (INHALATION) ONCE AS NEEDED
Status: DISCONTINUED | OUTPATIENT
Start: 2023-12-16 | End: 2023-12-16 | Stop reason: HOSPADM

## 2023-12-16 RX ORDER — NALOXONE HCL 0.4 MG/ML
0.4 VIAL (ML) INJECTION AS NEEDED
Status: DISCONTINUED | OUTPATIENT
Start: 2023-12-16 | End: 2023-12-16 | Stop reason: HOSPADM

## 2023-12-16 RX ORDER — PANTOPRAZOLE SODIUM 40 MG/10ML
40 INJECTION, POWDER, LYOPHILIZED, FOR SOLUTION INTRAVENOUS
Status: DISCONTINUED | OUTPATIENT
Start: 2023-12-16 | End: 2023-12-19 | Stop reason: HOSPADM

## 2023-12-16 RX ORDER — OXYCODONE HYDROCHLORIDE 5 MG/1
5 TABLET ORAL ONCE AS NEEDED
Status: DISCONTINUED | OUTPATIENT
Start: 2023-12-16 | End: 2023-12-16 | Stop reason: HOSPADM

## 2023-12-16 RX ORDER — LIDOCAINE HYDROCHLORIDE 20 MG/ML
INJECTION, SOLUTION INFILTRATION; PERINEURAL AS NEEDED
Status: DISCONTINUED | OUTPATIENT
Start: 2023-12-16 | End: 2023-12-16 | Stop reason: SURG

## 2023-12-16 RX ORDER — FENTANYL 12.5 UG/1
1 PATCH TRANSDERMAL
Status: DISCONTINUED | OUTPATIENT
Start: 2023-12-16 | End: 2023-12-19 | Stop reason: HOSPADM

## 2023-12-16 RX ADMIN — SODIUM CHLORIDE: 9 INJECTION, SOLUTION INTRAVENOUS at 13:23

## 2023-12-16 RX ADMIN — LORAZEPAM 1 MG: 2 INJECTION INTRAMUSCULAR; INTRAVENOUS at 01:41

## 2023-12-16 RX ADMIN — HYDROMORPHONE HYDROCHLORIDE 0.5 MG: 1 INJECTION, SOLUTION INTRAMUSCULAR; INTRAVENOUS; SUBCUTANEOUS at 04:05

## 2023-12-16 RX ADMIN — PANTOPRAZOLE SODIUM 40 MG: 40 INJECTION, POWDER, FOR SOLUTION INTRAVENOUS at 20:30

## 2023-12-16 RX ADMIN — FENTANYL CITRATE 50 MCG: 50 INJECTION, SOLUTION INTRAMUSCULAR; INTRAVENOUS at 13:29

## 2023-12-16 RX ADMIN — LIDOCAINE HYDROCHLORIDE 40 MG: 20 INJECTION, SOLUTION INFILTRATION; PERINEURAL at 13:32

## 2023-12-16 RX ADMIN — LORAZEPAM 1 MG: 2 INJECTION INTRAMUSCULAR; INTRAVENOUS at 20:51

## 2023-12-16 RX ADMIN — PROPOFOL 100 MCG/KG/MIN: 10 INJECTION, EMULSION INTRAVENOUS at 13:32

## 2023-12-16 RX ADMIN — HYDROMORPHONE HYDROCHLORIDE 1 MG: 1 INJECTION, SOLUTION INTRAMUSCULAR; INTRAVENOUS; SUBCUTANEOUS at 20:51

## 2023-12-16 RX ADMIN — HYDROMORPHONE HYDROCHLORIDE 0.5 MG: 1 INJECTION, SOLUTION INTRAMUSCULAR; INTRAVENOUS; SUBCUTANEOUS at 16:41

## 2023-12-16 RX ADMIN — Medication 10 ML: at 08:24

## 2023-12-16 RX ADMIN — ONDANSETRON 4 MG: 2 INJECTION INTRAMUSCULAR; INTRAVENOUS at 04:05

## 2023-12-16 RX ADMIN — Medication 10 ML: at 20:30

## 2023-12-16 RX ADMIN — HYDROMORPHONE HYDROCHLORIDE 0.5 MG: 1 INJECTION, SOLUTION INTRAMUSCULAR; INTRAVENOUS; SUBCUTANEOUS at 01:41

## 2023-12-16 RX ADMIN — LORAZEPAM 1 MG: 2 INJECTION INTRAMUSCULAR; INTRAVENOUS at 16:41

## 2023-12-16 RX ADMIN — ENOXAPARIN SODIUM 40 MG: 100 INJECTION SUBCUTANEOUS at 15:44

## 2023-12-16 RX ADMIN — HYDROMORPHONE HYDROCHLORIDE 0.5 MG: 1 INJECTION, SOLUTION INTRAMUSCULAR; INTRAVENOUS; SUBCUTANEOUS at 06:01

## 2023-12-16 RX ADMIN — ONDANSETRON 4 MG: 2 INJECTION INTRAMUSCULAR; INTRAVENOUS at 16:00

## 2023-12-16 RX ADMIN — LORAZEPAM 1 MG: 2 INJECTION INTRAMUSCULAR; INTRAVENOUS at 08:24

## 2023-12-16 RX ADMIN — HYDROMORPHONE HYDROCHLORIDE 1 MG: 1 INJECTION, SOLUTION INTRAMUSCULAR; INTRAVENOUS; SUBCUTANEOUS at 18:49

## 2023-12-16 RX ADMIN — HYDROMORPHONE HYDROCHLORIDE 1 MG: 1 INJECTION, SOLUTION INTRAMUSCULAR; INTRAVENOUS; SUBCUTANEOUS at 14:27

## 2023-12-16 RX ADMIN — PROPOFOL 50 MG: 10 INJECTION, EMULSION INTRAVENOUS at 13:32

## 2023-12-16 RX ADMIN — HYDROMORPHONE HYDROCHLORIDE 1 MG: 1 INJECTION, SOLUTION INTRAMUSCULAR; INTRAVENOUS; SUBCUTANEOUS at 23:34

## 2023-12-16 RX ADMIN — HYDROMORPHONE HYDROCHLORIDE 0.5 MG: 1 INJECTION, SOLUTION INTRAMUSCULAR; INTRAVENOUS; SUBCUTANEOUS at 08:24

## 2023-12-16 RX ADMIN — FENTANYL 1 PATCH: 12.5 PATCH TRANSDERMAL at 16:00

## 2023-12-16 RX ADMIN — FENTANYL CITRATE 50 MCG: 50 INJECTION, SOLUTION INTRAMUSCULAR; INTRAVENOUS at 13:25

## 2023-12-16 RX ADMIN — HYDROMORPHONE HYDROCHLORIDE 0.5 MG: 1 INJECTION, SOLUTION INTRAMUSCULAR; INTRAVENOUS; SUBCUTANEOUS at 10:38

## 2023-12-16 NOTE — ANESTHESIA POSTPROCEDURE EVALUATION
Patient: Dayron Manley    Procedure Summary       Date: 12/16/23 Room / Location: [facility] ENDOSCOPY 1 / [facility] ENDOSCOPY    Anesthesia Start: 1323 Anesthesia Stop: 1355    Procedure: ESOPHAGOGASTRODUODENOSCOPY WITH JEJUNAL TUBE INSERTION Diagnosis:       Alcohol-induced acute pancreatitis, unspecified complication status      Feeding difficulties      (Alcohol-induced acute pancreatitis, unspecified complication status [K85.20])      (Feeding difficulties [R63.30])    Surgeons: Luan Mendoza MD Provider: Sergio Quintana MD    Anesthesia Type: MAC, general ASA Status: 2            Anesthesia Type: MAC, general    Vitals  Vitals Value Taken Time   /87 12/16/23 1415   Temp     Pulse 92 12/16/23 1418   Resp     SpO2 99 % 12/16/23 1418   Vitals shown include unfiled device data.        Post Anesthesia Care and Evaluation    Patient location during evaluation: PACU  Patient participation: complete - patient participated  Level of consciousness: awake  Pain scale: See nurse's notes for pain score.  Pain management: adequate    Airway patency: patent  Anesthetic complications: No anesthetic complications  PONV Status: none  Cardiovascular status: acceptable  Respiratory status: acceptable and spontaneous ventilation  Hydration status: acceptable    Comments: Patient seen and examined postoperatively; vital signs stable; SpO2 greater than or equal to 90%; cardiopulmonary status stable; nausea/vomiting adequately controlled; pain adequately controlled; no apparent anesthesia complications; patient discharged from anesthesia care when discharge criteria were met

## 2023-12-16 NOTE — ANESTHESIA PREPROCEDURE EVALUATION
Anesthesia Evaluation     Patient summary reviewed and Nursing notes reviewed   NPO Solid Status: > 8 hours  NPO Liquid Status: > 8 hours           Airway   Mallampati: II  TM distance: >3 FB  Neck ROM: full  No difficulty expected  Dental - normal exam   (+) poor dentition    Pulmonary - negative pulmonary ROS and normal exam   Cardiovascular - negative cardio ROS and normal exam        Neuro/Psych- negative ROS  GI/Hepatic/Renal/Endo - negative ROS     ROS Comment: Pancreatitis    Musculoskeletal (-) negative ROS    Abdominal  - normal exam    Bowel sounds: normal.   Substance History - negative use     OB/GYN negative ob/gyn ROS         Other        ROS/Med Hx Other: Very poor dentition, advanced decay throughout  Quit smoking 3 wk ago              Anesthesia Plan    ASA 2     MAC and general     intravenous induction     Anesthetic plan, risks, benefits, and alternatives have been provided, discussed and informed consent has been obtained with: patient.  Pre-procedure education provided  Plan discussed with CRNA.    CODE STATUS:    Code Status (Patient has no pulse and is not breathing): CPR (Attempt to Resuscitate)  Medical Interventions (Patient has pulse or is breathing): Full Support

## 2023-12-16 NOTE — OP NOTE
ESOPHAGOGASTRODUODENOSCOPY WITH JEJUNAL TUBE INSERTION Procedure Report    Patient Name:  Dayron Manley  YOB: 1980    Date of Surgery:  12/16/2023     Pre-Op Diagnosis:  Alcohol-induced acute pancreatitis, unspecified complication status [K85.20]  Feeding difficulties [R63.30]         Procedure/CPT® Codes:      Procedure(s):  ESOPHAGOGASTRODUODENOSCOPY WITH JEJUNAL TUBE INSERTION    Staff:  Surgeon(s):  Luan Mendoza MD      Anesthesia: Monitored Anesthesia Care    Description of Procedure:  A description of the procedure as well as risks, benefits and alternative methods were explained to the patient who voiced understanding and signed the corresponding consent form. A physical exam was performed and vital signs were monitored throughout the procedure.    An upper GI endoscope was placed into the mouth and proceeded through the esophagus, stomach and second portion of the duodenum without difficulty. The scope was then retroflexed and the fundus was visualized. The procedure was not difficult and there were no immediate complications.  Scope advanced all the way to proximal jejunum.  Fluoroscopy was used, wire was placed through the scope, slowly withdrawn over the scope subsequently NJ tube was placed in left nares secured with bridle.  Patient Toller procedure very well.  Large amount of fluid was suctioned from the stomach bulge was noticed in the antrum and duodenal bulb area from large pseudocyst        Impression:  Successful NJ tube placement  Retained gastric secretion and fluid was suction  Bulge was noticed from pseudocyst in the antrum and duodenal bulb    Recommendations:  Okay to start using NJ tube.  Continue with the supportive care for pancreatitis.  Patient will need EUS with Axios stent placement 2 to 3 weeks once cyst is mature        Luan Mendoza MD     Date: 12/16/2023    Time: 13:47 EST

## 2023-12-16 NOTE — PLAN OF CARE
Goal Outcome Evaluation:  Plan of Care Reviewed With: patient, family        Progress: no change  Outcome Evaluation: Pt is in constant pain.  Requested increase in dilaudid from hospitalist, but got new order for fentanyl patch.  New NJ tube replaced to allow pancreas to rest.  Plan to control pain.

## 2023-12-16 NOTE — NURSING NOTE
Pt refusing labs @ this time, stating he is a 10/10 and in to much pain   @ 0600 this pt. Agreed to let me draw his labs

## 2023-12-16 NOTE — PROGRESS NOTES
Nutrition Services    Patient Name: Dayron Manley  YOB: 1980  MRN: [id number]  Admission date: 11/30/2023    PPE Documentation        PPE Worn By Provider Did not enter room for this encounter   PPE Worn By Patient  N/A     PROGRESS NOTE      Encounter Information: Progress note to check on TF. Pt had NJ tube replaced this afternoon, after it became clogged yesterday 12/15. TF to resume today.       PO Diet: NPO Diet NPO Type: Strict NPO   PO Supplements: Has active order for Boost Plus TID (provides 1080 kcals, 42 g protein if consumed), but will D/C as pt now has TF in place   PO Intake:  Minimal intake with nature of frequent liquid diets and NPO status this admission        Current nutrition support: Isosource 1.5 at 70 mL/hr + 10 mL q2 water flush    Nutrition support review: On hold due to problems with tube; tube just replaced today 12/16; will monitor for this to resume        Labs (reviewed below): Reviewed, management per attending         GI Function:  BM 12/12, ongoing constipation; GI following   Residuals N/A related to J-tube        Nutrition Intervention Updates: When ready to resume EN via newly-replaced tube, resume Isosource 1.5, working toward goal rate of 70mL/hour, with 10mL water flush q 2 hours.       Results from last 7 days   Lab Units 12/16/23  0616 12/14/23  0401 12/13/23  0420   SODIUM mmol/L 136 137 136   POTASSIUM mmol/L 4.3 4.4 4.2   CHLORIDE mmol/L 98 100 102   CO2 mmol/L 26.0 26.0 27.0   BUN mg/dL 13 9 10   CREATININE mg/dL 0.77 0.71* 0.74*   CALCIUM mg/dL 9.4 9.5 9.3   BILIRUBIN mg/dL 0.4 0.3 0.3   ALK PHOS U/L 95 105 97   ALT (SGPT) U/L 20 14 13   AST (SGOT) U/L 26 16 15   GLUCOSE mg/dL 107* 111* 126*     Results from last 7 days   Lab Units 12/16/23  0616 12/11/23  0414 12/10/23  0632   MAGNESIUM mg/dL 2.0  --  2.3   PHOSPHORUS mg/dL 4.4  --  2.9   HEMOGLOBIN g/dL 13.5   < > 11.9*   HEMATOCRIT % 41.6   < > 36.2*    < > = values in this interval not displayed.  "    No results found for: \"COVID19\"  Lab Results   Component Value Date    HGBA1C 5.10 11/30/2023     RD to follow up per protocol.    Electronically signed by:  Suma Whitaker RD  12/16/23 15:35 EST    "

## 2023-12-16 NOTE — NURSING NOTE
Pt refused labs, primary RN educated pt on importance of compliance with lab work, pt stated that he understood but he did want to get his labs drawn

## 2023-12-17 PROBLEM — E43 SEVERE MALNUTRITION: Status: ACTIVE | Noted: 2023-12-17

## 2023-12-17 LAB
ALBUMIN SERPL-MCNC: 3.5 G/DL (ref 3.5–5.2)
ALBUMIN/GLOB SERPL: 1.3 G/DL
ALP SERPL-CCNC: 93 U/L (ref 39–117)
ALT SERPL W P-5'-P-CCNC: 27 U/L (ref 1–41)
AMYLASE SERPL-CCNC: 445 U/L (ref 28–100)
ANION GAP SERPL CALCULATED.3IONS-SCNC: 10 MMOL/L (ref 5–15)
AST SERPL-CCNC: 25 U/L (ref 1–40)
BILIRUB SERPL-MCNC: 0.4 MG/DL (ref 0–1.2)
BUN SERPL-MCNC: 18 MG/DL (ref 6–20)
BUN/CREAT SERPL: 21.7 (ref 7–25)
CALCIUM SPEC-SCNC: 8.7 MG/DL (ref 8.6–10.5)
CHLORIDE SERPL-SCNC: 100 MMOL/L (ref 98–107)
CO2 SERPL-SCNC: 27 MMOL/L (ref 22–29)
CREAT SERPL-MCNC: 0.83 MG/DL (ref 0.76–1.27)
CRP SERPL-MCNC: 2.23 MG/DL (ref 0–0.5)
CRP SERPL-MCNC: 2.77 MG/DL (ref 0–0.5)
DEPRECATED RDW RBC AUTO: 38.5 FL (ref 37–54)
EGFRCR SERPLBLD CKD-EPI 2021: 112.1 ML/MIN/1.73
ERYTHROCYTE [DISTWIDTH] IN BLOOD BY AUTOMATED COUNT: 13.2 % (ref 12.3–15.4)
GLOBULIN UR ELPH-MCNC: 2.8 GM/DL
GLUCOSE BLDC GLUCOMTR-MCNC: 103 MG/DL (ref 70–105)
GLUCOSE BLDC GLUCOMTR-MCNC: 112 MG/DL (ref 70–105)
GLUCOSE BLDC GLUCOMTR-MCNC: 117 MG/DL (ref 70–105)
GLUCOSE BLDC GLUCOMTR-MCNC: 127 MG/DL (ref 70–105)
GLUCOSE BLDC GLUCOMTR-MCNC: 96 MG/DL (ref 70–105)
GLUCOSE SERPL-MCNC: 128 MG/DL (ref 65–99)
HCT VFR BLD AUTO: 39.9 % (ref 37.5–51)
HGB BLD-MCNC: 12.7 G/DL (ref 13–17.7)
LIPASE SERPL-CCNC: 1001 U/L (ref 13–60)
MCH RBC QN AUTO: 26.6 PG (ref 26.6–33)
MCHC RBC AUTO-ENTMCNC: 31.9 G/DL (ref 31.5–35.7)
MCV RBC AUTO: 83.2 FL (ref 79–97)
PLATELET # BLD AUTO: 420 10*3/MM3 (ref 140–450)
PMV BLD AUTO: 8.5 FL (ref 6–12)
POTASSIUM SERPL-SCNC: 4.1 MMOL/L (ref 3.5–5.2)
PROT SERPL-MCNC: 6.3 G/DL (ref 6–8.5)
RBC # BLD AUTO: 4.8 10*6/MM3 (ref 4.14–5.8)
SODIUM SERPL-SCNC: 137 MMOL/L (ref 136–145)
WBC NRBC COR # BLD AUTO: 8.1 10*3/MM3 (ref 3.4–10.8)

## 2023-12-17 PROCEDURE — 85027 COMPLETE CBC AUTOMATED: CPT | Performed by: NURSE PRACTITIONER

## 2023-12-17 PROCEDURE — 83690 ASSAY OF LIPASE: CPT | Performed by: NURSE PRACTITIONER

## 2023-12-17 PROCEDURE — 99231 SBSQ HOSP IP/OBS SF/LOW 25: CPT

## 2023-12-17 PROCEDURE — 82150 ASSAY OF AMYLASE: CPT | Performed by: NURSE PRACTITIONER

## 2023-12-17 PROCEDURE — 86140 C-REACTIVE PROTEIN: CPT | Performed by: NURSE PRACTITIONER

## 2023-12-17 PROCEDURE — 25010000002 LORAZEPAM PER 2 MG: Performed by: STUDENT IN AN ORGANIZED HEALTH CARE EDUCATION/TRAINING PROGRAM

## 2023-12-17 PROCEDURE — 82948 REAGENT STRIP/BLOOD GLUCOSE: CPT

## 2023-12-17 PROCEDURE — 25010000002 ENOXAPARIN PER 10 MG: Performed by: HOSPITALIST

## 2023-12-17 PROCEDURE — 80053 COMPREHEN METABOLIC PANEL: CPT | Performed by: NURSE PRACTITIONER

## 2023-12-17 PROCEDURE — 25010000002 HYDROMORPHONE 1 MG/ML SOLUTION: Performed by: STUDENT IN AN ORGANIZED HEALTH CARE EDUCATION/TRAINING PROGRAM

## 2023-12-17 RX ADMIN — LORAZEPAM 1 MG: 2 INJECTION INTRAMUSCULAR; INTRAVENOUS at 20:06

## 2023-12-17 RX ADMIN — HYDROMORPHONE HYDROCHLORIDE 1 MG: 1 INJECTION, SOLUTION INTRAMUSCULAR; INTRAVENOUS; SUBCUTANEOUS at 05:56

## 2023-12-17 RX ADMIN — HYDROMORPHONE HYDROCHLORIDE 1 MG: 1 INJECTION, SOLUTION INTRAMUSCULAR; INTRAVENOUS; SUBCUTANEOUS at 01:38

## 2023-12-17 RX ADMIN — HYDROMORPHONE HYDROCHLORIDE 1 MG: 1 INJECTION, SOLUTION INTRAMUSCULAR; INTRAVENOUS; SUBCUTANEOUS at 19:12

## 2023-12-17 RX ADMIN — HYDROMORPHONE HYDROCHLORIDE 1 MG: 1 INJECTION, SOLUTION INTRAMUSCULAR; INTRAVENOUS; SUBCUTANEOUS at 10:32

## 2023-12-17 RX ADMIN — HYDROMORPHONE HYDROCHLORIDE 1 MG: 1 INJECTION, SOLUTION INTRAMUSCULAR; INTRAVENOUS; SUBCUTANEOUS at 23:14

## 2023-12-17 RX ADMIN — HYDROMORPHONE HYDROCHLORIDE 1 MG: 1 INJECTION, SOLUTION INTRAMUSCULAR; INTRAVENOUS; SUBCUTANEOUS at 14:36

## 2023-12-17 RX ADMIN — Medication 10 ML: at 21:12

## 2023-12-17 RX ADMIN — PANTOPRAZOLE SODIUM 40 MG: 40 INJECTION, POWDER, FOR SOLUTION INTRAVENOUS at 05:56

## 2023-12-17 RX ADMIN — Medication 10 ML: at 23:15

## 2023-12-17 RX ADMIN — LORAZEPAM 1 MG: 2 INJECTION INTRAMUSCULAR; INTRAVENOUS at 12:35

## 2023-12-17 RX ADMIN — HYDROMORPHONE HYDROCHLORIDE 1 MG: 1 INJECTION, SOLUTION INTRAMUSCULAR; INTRAVENOUS; SUBCUTANEOUS at 21:12

## 2023-12-17 RX ADMIN — HYDROMORPHONE HYDROCHLORIDE 1 MG: 1 INJECTION, SOLUTION INTRAMUSCULAR; INTRAVENOUS; SUBCUTANEOUS at 16:39

## 2023-12-17 RX ADMIN — HYDROMORPHONE HYDROCHLORIDE 1 MG: 1 INJECTION, SOLUTION INTRAMUSCULAR; INTRAVENOUS; SUBCUTANEOUS at 03:56

## 2023-12-17 RX ADMIN — HYDROMORPHONE HYDROCHLORIDE 1 MG: 1 INJECTION, SOLUTION INTRAMUSCULAR; INTRAVENOUS; SUBCUTANEOUS at 12:35

## 2023-12-17 RX ADMIN — ENOXAPARIN SODIUM 40 MG: 100 INJECTION SUBCUTANEOUS at 16:39

## 2023-12-17 RX ADMIN — LORAZEPAM 1 MG: 2 INJECTION INTRAMUSCULAR; INTRAVENOUS at 03:56

## 2023-12-17 RX ADMIN — LORAZEPAM 1 MG: 2 INJECTION INTRAMUSCULAR; INTRAVENOUS at 08:28

## 2023-12-17 RX ADMIN — LORAZEPAM 1 MG: 2 INJECTION INTRAMUSCULAR; INTRAVENOUS at 23:14

## 2023-12-17 RX ADMIN — Medication 10 ML: at 08:29

## 2023-12-17 RX ADMIN — HYDROMORPHONE HYDROCHLORIDE 1 MG: 1 INJECTION, SOLUTION INTRAMUSCULAR; INTRAVENOUS; SUBCUTANEOUS at 08:28

## 2023-12-17 NOTE — PROGRESS NOTES
"Nutrition Services    Patient Name: Dayron Manley  YOB: 1980  MRN: [id number]  Admission date: 11/30/2023    PROGRESS NOTE      Encounter Information: Progress note to check on TF. Pt had NJ tube replaced yesterday 12/16. TF has resumed and pt is working back toward goal rate; currently at rate of 60mL/hour and tolerating.       PO Diet: NPO Diet NPO Type: Strict NPO   PO Supplements: None ordered   PO Intake:  Minimal intake with nature of frequent liquid diets and NPO status this admission        Current nutrition support: Isosource 1.5 at 70 mL/hr + 10 mL q2 water flush    Nutrition support review: So far, TF has reached rate of 60mL/hour and is advancing to goal       Labs (reviewed below): Reviewed, management per attending         GI Function:  BM 12/12, ongoing constipation; GI following - secure message sent to attending MD today 12/17 to include in conversation - MD replied with plan for suppository  Residuals N/A related to J-tube        Nutrition Intervention Updates: Continue to advance TF toward goal rate of toward goal rate of 70mL/hour, with 10mL water flush q 2 hours.       Results from last 7 days   Lab Units 12/17/23  0332 12/16/23  0616 12/14/23  0401   SODIUM mmol/L 137 136 137   POTASSIUM mmol/L 4.1 4.3 4.4   CHLORIDE mmol/L 100 98 100   CO2 mmol/L 27.0 26.0 26.0   BUN mg/dL 18 13 9   CREATININE mg/dL 0.83 0.77 0.71*   CALCIUM mg/dL 8.7 9.4 9.5   BILIRUBIN mg/dL 0.4 0.4 0.3   ALK PHOS U/L 93 95 105   ALT (SGPT) U/L 27 20 14   AST (SGOT) U/L 25 26 16   GLUCOSE mg/dL 128* 107* 111*     Results from last 7 days   Lab Units 12/17/23  0332 12/16/23  0616   MAGNESIUM mg/dL  --  2.0   PHOSPHORUS mg/dL  --  4.4   HEMOGLOBIN g/dL 12.7* 13.5   HEMATOCRIT % 39.9 41.6     No results found for: \"COVID19\"  Lab Results   Component Value Date    HGBA1C 5.10 11/30/2023     RD to follow up per protocol.    Electronically signed by:  Suma Whitaker RD  12/17/23 08:52 EST    "

## 2023-12-17 NOTE — PLAN OF CARE
Goal Outcome Evaluation:  Plan of Care Reviewed With: patient        Progress: no change  Outcome Evaluation: Pt cont to complaint of pain , requested dilaudid every 4 hrs. pt is on feeding tube with a goal of 70ml/hr.  Pt rested during this shift, will cont to mnitor.

## 2023-12-17 NOTE — PROGRESS NOTES
LOS: 16 days   Patient Care Team:  Nancy El APRN as PCP - General (Family Medicine)      Subjective    Feeling about the same    Interval History:   12/16/2023 EGD with nasojejunal tube insertion (Dr. Mendoza) retained gastric secretions with fluid suction.  Bulge was noted pseudocyst in the antrum and duodenal bulb.  On the EUS with Axios stent placement in 2 to 3 weeks once it is matured.  Labs: BMP normal.  LFTs normal.  CRP 2.23 (1.31).  WBCs 8.1, hemoglobin 12.7 (13.5), platelets 420.    ROS:   Abdominal pain  No chest pain, shortness of breath, or cough.      Medication Review:     Current Facility-Administered Medications:     ALPRAZolam (XANAX) tablet 0.5 mg, 0.5 mg, Oral, TID PRN, Nataliia Damon, HECTOR, APRN    fentaNYL (DURAGESIC) 12 MCG/HR 1 patch, 1 patch, Transdermal, Q72H, 1 patch at 12/16/23 1600 **AND** Check Fentanyl Patch Placement, 1 each, Does not apply, Q12H, Tyrone Dawson MD    dextrose (D50W) (25 g/50 mL) IV injection 25 g, 25 g, Intravenous, Q15 Min PRN, Vargas Sotelo DO, 25 g at 12/08/23 1850    dextrose (GLUTOSE) oral gel 15 g, 15 g, Oral, Q15 Min PRN, Vargas Sotelo DO    dextrose 5 % and sodium chloride 0.45 % infusion, 100 mL/hr, Intravenous, Continuous, Fabian Stratton MD, Last Rate: 100 mL/hr at 12/15/23 2054, 100 mL/hr at 12/15/23 2054    Enoxaparin Sodium (LOVENOX) syringe 40 mg, 40 mg, Subcutaneous, Q24H, Maile Purvis MD, 40 mg at 12/16/23 1544    glucagon (GLUCAGEN) injection 1 mg, 1 mg, Subcutaneous, Q15 Min PRN, Vargas Sotelo DO    HYDROmorphone (DILAUDID) injection 1 mg, 1 mg, Intravenous, Q2H PRN, Tyrone Dawson MD, 1 mg at 12/17/23 0828    LORazepam (ATIVAN) injection 1 mg, 1 mg, Intravenous, Q4H PRN, Tyrone Dawson MD, 1 mg at 12/17/23 0828    ondansetron (ZOFRAN) injection 4 mg, 4 mg, Intravenous, Q6H PRN, Tracey Fernández APRN, 4 mg at 12/16/23 1600    pantoprazole (PROTONIX) injection 40 mg, 40 mg, Intravenous, Q AM, Sol Nelson APRN, 40 mg at  12/17/23 0556    sodium chloride 0.9 % flush 10 mL, 10 mL, Intravenous, PRN, Sarabjit Harris PA    sodium chloride 0.9 % flush 10 mL, 10 mL, Intravenous, Q12H, Maile Purvis MD, 10 mL at 12/17/23 0829    sodium chloride 0.9 % flush 10 mL, 10 mL, Intravenous, PRN, Maile Purvis MD, 10 mL at 12/11/23 0430    sodium chloride 0.9 % infusion 40 mL, 40 mL, Intravenous, PRN, Maile Purvis MD, 100 mL/hr at 12/08/23 1511      Objective resting in bed, ill-appearing but no acute distress.  Appears depressed    Vital Signs  Vitals:    12/16/23 1911 12/16/23 2236 12/17/23 0258 12/17/23 0601   BP: 102/61 106/76 104/66 103/61   BP Location: Right arm Left arm Left arm Left arm   Patient Position: Lying Lying Lying Lying   Pulse:  97 89 92   Resp: 14 16 15 12   Temp: 97.8 °F (36.6 °C) 98.4 °F (36.9 °C) 98.4 °F (36.9 °C) 98.2 °F (36.8 °C)   TempSrc: Oral Oral Oral Oral   SpO2:  98% 98% 98%   Weight:       Height:           Physical Exam:     General Appearance:    Awake and alert, in no acute distress   Head:    Normocephalic, without obvious abnormality   Eyes:          Conjunctivae normal, anicteric sclera   Throat:   No oral lesions, no thrush, oral mucosa moist, NJ intact   Neck:   supple, no JVD   Lungs:     respirations regular, even and unlabored   Abdomen:     upper abdominal tenderness, nondistended   Rectal:     Deferred   Extremities:   No edema, no cyanosis   Skin:   No bruising or rash, no jaundice        Results Review:    CBC    Results from last 7 days   Lab Units 12/17/23  0332 12/16/23  0616 12/14/23  0401 12/13/23  0420 12/12/23  0523 12/11/23  0414   WBC 10*3/mm3 8.10 7.80 7.50 5.50 3.80 4.10   HEMOGLOBIN g/dL 12.7* 13.5 13.3 11.8* 12.0* 11.2*   PLATELETS 10*3/mm3 420 382 414 385 414 367     CMP   Results from last 7 days   Lab Units 12/17/23  0332 12/16/23  0616 12/14/23  0401 12/13/23  0420 12/12/23  0523 12/11/23  0414 12/10/23  1151   SODIUM mmol/L 137 136 137 136 139 140  --    POTASSIUM mmol/L  4.1 4.3 4.4 4.2 4.0 4.2  --    CHLORIDE mmol/L 100 98 100 102 102 106  --    CO2 mmol/L 27.0 26.0 26.0 27.0 29.0 25.0  --    BUN mg/dL 18 13 9 10 10 6  --    CREATININE mg/dL 0.83 0.77 0.71* 0.74* 0.77 0.65*  --    GLUCOSE mg/dL 128* 107* 111* 126* 89 111*  --    ALBUMIN g/dL 3.5 3.9 4.0 3.7 3.7 3.2*  --    BILIRUBIN mg/dL 0.4 0.4 0.3 0.3 0.3 0.3  --    ALK PHOS U/L 93 95 105 97 104 90  --    AST (SGOT) U/L 25 26 16 15 14 11  --    ALT (SGPT) U/L 27 20 14 13 13 11  --    MAGNESIUM mg/dL  --  2.0  --   --   --   --   --    PHOSPHORUS mg/dL  --  4.4  --   --   --   --   --    LIPASE U/L  --   --  1,604* 1,262* 1,379* 1,232* 1,359*     Cr Clearance Estimated Creatinine Clearance: 96.8 mL/min (by C-G formula based on SCr of 0.83 mg/dL).  Coag     HbA1C   Lab Results   Component Value Date    HGBA1C 5.10 11/30/2023     Blood Glucose   Glucose   Date/Time Value Ref Range Status   12/17/2023 0706 112 (H) 70 - 105 mg/dL Final     Comment:     Serial Number: [id number]:  [id number]   12/17/2023 0618 117 (H) 70 - 105 mg/dL Final     Comment:     Serial Number: [id number]:  [id number]   12/17/2023 0007 96 70 - 105 mg/dL Final     Comment:     Serial Number: [id number]:  [id number]   12/16/2023 1535 86 70 - 105 mg/dL Final     Comment:     Serial Number: [id number]:  [id number]   12/16/2023 0732 99 70 - 105 mg/dL Final     Comment:     Serial Number: [id number]:  [id number]   12/16/2023 0543 107 (H) 70 - 105 mg/dL Final     Comment:     Serial Number: [id number]:  [id number]   12/15/2023 1759 108 (H) 70 - 105 mg/dL Final     Comment:     Serial Number: [id number]:  [id number]   12/15/2023 1300 101 70 - 105 mg/dL Final     Comment:     Serial Number: [id number]:  [id number]     Infection     UA      Radiology(recent) CT Abdomen Pelvis With & Without Contrast    Result Date: 12/15/2023  Impression: Increased size of large pancreatic pseudocyst which can is contiguous with a smaller  pseudocyst which also have increased in size. Largely resolved peripancreatic inflammatory changes. Resolved pleural effusions. Decreased amount of ascites. Moderate gastric distention with mass effect on the distal stomach caused by the large pseudocyst. Electronically Signed: Lubna Max MD  12/15/2023 11:17 AM EST  Workstation ID: [id number]        Assessment & Plan   Acute pancreatitis -alcohol induced  Pancreatic pseudocysts  Elevated LFTs -normalized  Alcohol abuse  Abnormal drug screen -positive cocaine and opiates  Mild normocytic anemia     12/8/2023 EGD (Dr. Mendoza) successful NJ tube placement.  Retained fluid in the stomach with minimal changes of gastritis.  12/16/2023 EGD with nasojejunal tube insertion (Dr. Mendoza) retained gastric secretions with fluid suction.  Bulge was noted pseudocyst in the antrum and duodenal bulb.  On the EUS with Axios stent placement in 2 to 3 weeks once it is matured.     PLAN:  NJ tube replaced yesterday.  Tolerating tube feeds at 60 cc an hour.  Has not had a bowel movement.  He will need to go home with home health care with tube feeds.  Okay for ice chips and sips      Electronically signed by TARAN Conklin, 12/17/23, 10:20 AM EST.

## 2023-12-18 LAB
AMYLASE SERPL-CCNC: 407 U/L (ref 28–100)
CRP SERPL-MCNC: 3.12 MG/DL (ref 0–0.5)
GLUCOSE BLDC GLUCOMTR-MCNC: 100 MG/DL (ref 70–105)
GLUCOSE BLDC GLUCOMTR-MCNC: 100 MG/DL (ref 70–105)
GLUCOSE BLDC GLUCOMTR-MCNC: 95 MG/DL (ref 70–105)
LIPASE SERPL-CCNC: 1034 U/L (ref 13–60)

## 2023-12-18 PROCEDURE — 82150 ASSAY OF AMYLASE: CPT | Performed by: NURSE PRACTITIONER

## 2023-12-18 PROCEDURE — 86140 C-REACTIVE PROTEIN: CPT | Performed by: NURSE PRACTITIONER

## 2023-12-18 PROCEDURE — 97162 PT EVAL MOD COMPLEX 30 MIN: CPT

## 2023-12-18 PROCEDURE — 25010000002 LORAZEPAM PER 2 MG: Performed by: STUDENT IN AN ORGANIZED HEALTH CARE EDUCATION/TRAINING PROGRAM

## 2023-12-18 PROCEDURE — 83690 ASSAY OF LIPASE: CPT | Performed by: NURSE PRACTITIONER

## 2023-12-18 PROCEDURE — 82948 REAGENT STRIP/BLOOD GLUCOSE: CPT

## 2023-12-18 PROCEDURE — 25010000002 HYDROMORPHONE 1 MG/ML SOLUTION: Performed by: STUDENT IN AN ORGANIZED HEALTH CARE EDUCATION/TRAINING PROGRAM

## 2023-12-18 PROCEDURE — 25010000002 ENOXAPARIN PER 10 MG: Performed by: HOSPITALIST

## 2023-12-18 RX ORDER — OXYCODONE HYDROCHLORIDE 5 MG/1
10 TABLET ORAL EVERY 4 HOURS PRN
Status: DISCONTINUED | OUTPATIENT
Start: 2023-12-18 | End: 2023-12-19 | Stop reason: HOSPADM

## 2023-12-18 RX ORDER — ONDANSETRON 4 MG/1
4 TABLET, FILM COATED ORAL EVERY 6 HOURS PRN
Qty: 60 TABLET | Refills: 0 | Status: SHIPPED | OUTPATIENT
Start: 2023-12-18 | End: 2024-01-17

## 2023-12-18 RX ORDER — NALOXONE HYDROCHLORIDE 4 MG/.1ML
SPRAY NASAL
Qty: 2 EACH | Refills: 0 | Status: CANCELLED | OUTPATIENT
Start: 2023-12-18

## 2023-12-18 RX ORDER — OXYCODONE HYDROCHLORIDE 10 MG/1
10 TABLET ORAL EVERY 4 HOURS PRN
Qty: 42 TABLET | Refills: 0 | Status: CANCELLED | OUTPATIENT
Start: 2023-12-18 | End: 2023-12-25

## 2023-12-18 RX ORDER — FENTANYL 12.5 UG/1
1 PATCH TRANSDERMAL
Qty: 2 PATCH | Refills: 0 | Status: CANCELLED | OUTPATIENT
Start: 2023-12-18 | End: 2023-12-22

## 2023-12-18 RX ORDER — FENTANYL 12.5 UG/1
1 PATCH TRANSDERMAL
Qty: 2 PATCH | Refills: 0 | Status: SHIPPED | OUTPATIENT
Start: 2023-12-18 | End: 2023-12-24

## 2023-12-18 RX ORDER — ONDANSETRON 4 MG/1
4 TABLET, FILM COATED ORAL EVERY 6 HOURS PRN
Qty: 60 TABLET | Refills: 0 | Status: CANCELLED | OUTPATIENT
Start: 2023-12-18 | End: 2024-01-17

## 2023-12-18 RX ORDER — OXYCODONE HYDROCHLORIDE 10 MG/1
10 TABLET ORAL EVERY 4 HOURS PRN
Qty: 42 TABLET | Refills: 0 | Status: SHIPPED | OUTPATIENT
Start: 2023-12-18 | End: 2023-12-25

## 2023-12-18 RX ADMIN — Medication 10 ML: at 05:14

## 2023-12-18 RX ADMIN — HYDROMORPHONE HYDROCHLORIDE 1 MG: 1 INJECTION, SOLUTION INTRAMUSCULAR; INTRAVENOUS; SUBCUTANEOUS at 07:47

## 2023-12-18 RX ADMIN — OXYCODONE HYDROCHLORIDE 10 MG: 5 TABLET ORAL at 22:36

## 2023-12-18 RX ADMIN — Medication 10 ML: at 01:10

## 2023-12-18 RX ADMIN — DEXTROSE AND SODIUM CHLORIDE 100 ML/HR: 5; 450 INJECTION, SOLUTION INTRAVENOUS at 09:07

## 2023-12-18 RX ADMIN — LORAZEPAM 1 MG: 2 INJECTION INTRAMUSCULAR; INTRAVENOUS at 22:36

## 2023-12-18 RX ADMIN — HYDROMORPHONE HYDROCHLORIDE 1 MG: 1 INJECTION, SOLUTION INTRAMUSCULAR; INTRAVENOUS; SUBCUTANEOUS at 01:10

## 2023-12-18 RX ADMIN — OXYCODONE HYDROCHLORIDE 10 MG: 5 TABLET ORAL at 18:41

## 2023-12-18 RX ADMIN — HYDROMORPHONE HYDROCHLORIDE 1 MG: 1 INJECTION, SOLUTION INTRAMUSCULAR; INTRAVENOUS; SUBCUTANEOUS at 10:12

## 2023-12-18 RX ADMIN — DEXTROSE AND SODIUM CHLORIDE 100 ML/HR: 5; 450 INJECTION, SOLUTION INTRAVENOUS at 18:42

## 2023-12-18 RX ADMIN — OXYCODONE HYDROCHLORIDE 10 MG: 5 TABLET ORAL at 14:54

## 2023-12-18 RX ADMIN — Medication 10 ML: at 03:14

## 2023-12-18 RX ADMIN — LORAZEPAM 1 MG: 2 INJECTION INTRAMUSCULAR; INTRAVENOUS at 03:13

## 2023-12-18 RX ADMIN — HYDROMORPHONE HYDROCHLORIDE 1 MG: 1 INJECTION, SOLUTION INTRAMUSCULAR; INTRAVENOUS; SUBCUTANEOUS at 12:13

## 2023-12-18 RX ADMIN — HYDROMORPHONE HYDROCHLORIDE 1 MG: 1 INJECTION, SOLUTION INTRAMUSCULAR; INTRAVENOUS; SUBCUTANEOUS at 05:14

## 2023-12-18 RX ADMIN — Medication 10 ML: at 07:47

## 2023-12-18 RX ADMIN — Medication 10 ML: at 12:14

## 2023-12-18 RX ADMIN — Medication 10 ML: at 22:37

## 2023-12-18 RX ADMIN — PANTOPRAZOLE SODIUM 40 MG: 40 INJECTION, POWDER, FOR SOLUTION INTRAVENOUS at 05:14

## 2023-12-18 RX ADMIN — Medication 10 ML: at 07:50

## 2023-12-18 RX ADMIN — ENOXAPARIN SODIUM 40 MG: 100 INJECTION SUBCUTANEOUS at 16:38

## 2023-12-18 RX ADMIN — HYDROMORPHONE HYDROCHLORIDE 1 MG: 1 INJECTION, SOLUTION INTRAMUSCULAR; INTRAVENOUS; SUBCUTANEOUS at 03:13

## 2023-12-18 NOTE — CASE MANAGEMENT/SOCIAL WORK
Continued Stay Note  JANE Barger     Patient Name: Dayron Manley  MRN: [id number]  Today's Date: 12/18/2023    Admit Date: 11/30/2023    Plan: Return home alone. Meds to Bed. [facility] for NJ tube feedings   Discharge Plan       Row Name 12/18/23 1548       Plan    Plan Comments Barriers: [facility] unable to provide teaching and supplies for tube feeding until tomorrow. [facility] to contact pt to set up time.             Jody Herrera RN      Office phone: [phone]  Office fax: [phone]

## 2023-12-18 NOTE — PLAN OF CARE
Goal Outcome Evaluation:  Plan of Care Reviewed With: patient        Progress: no change  Outcome Evaluation: Patient continues to c/o pain in abdomen. Requesting IV dilaudid q2 hours, he used his call light 15-30 minutes early requesting pain med so that he could get it the minute it's available to him. Tube feeding at 70ml/hr and tolerating well. patient sleeping in between pain meds.

## 2023-12-18 NOTE — PLAN OF CARE
Goal Outcome Evaluation:  Plan of Care Reviewed With: patient, mother           Outcome Evaluation: 44 yo male adm 11/30/23 for acute pancreatitis. UDS (+) on admission for cocaine and opiates. Hx of substance abuse & etoh abuse, but no significant etoh level in system at admission. PMH: difficulty feeding, severe malnutrition. At baseline, pt lives in second floor apartment w/ 15 stairs to enter and handrails. Single level once inside apartment. Shares apartment w/ a friend, whom he states is also a car . Pt reports he has lost his job since admission. Normally very active and independent. Today, pt is requiring dobb wai tube for feeding. Was found in dark room w/ feeding in progress w/ head of bed elevated < 30*. PT immediately stopped feeding and elevated head of bed to 30*. Educated pt and his mother (who was present) on need to keep head elevated to prevent aspiration. Also educated on need to keep lights on in room during day to maintain appropriate circadian rhythm. Pt able to come to sit, collins his socks, come to stand, and amb 240 ft independently w/o significant gait deviations or loss of balance. Educated pt on importance of getting out of bed and detriments of bedrest. Repositioned pt in chair. Advised pt to stay in chair until after dinner time. Also to take walk in [place] w/ nsg 2-3 times daily to maintain strength and promote improved health. No need for skilled PT at this time. Recommend home when stable. Will sign off.

## 2023-12-18 NOTE — THERAPY EVALUATION
Patient Name: Dayron Manley  : 1980    MRN: [id number]                              Today's Date: 2023       Admit Date: 2023    Visit Dx:     ICD-10-CM ICD-9-CM   1. Acute pancreatitis, unspecified complication status, unspecified pancreatitis type  K85.90 577.0   2. SINGH (acute kidney injury)  N17.9 584.9   3. Left upper quadrant abdominal pain  R10.12 789.02   4. Alcohol-induced acute pancreatitis, unspecified complication status  K85.20 577.0   5. Feeding difficulties  R63.30 783.3     Patient Active Problem List   Diagnosis    Pancreatitis    Acute pancreatitis    Feeding difficulties    Severe malnutrition     History reviewed. No pertinent past medical history.  Past Surgical History:   Procedure Laterality Date    ENDOSCOPY N/A 2023    Procedure: ESOPHAGOGASTRODUODENOSCOPY with NJ tube placement;  Surgeon: Luan Mendoza MD;  Location: [facility] ENDOSCOPY;  Service: Gastroenterology;  Laterality: N/A;      General Information       Row Name 23 [facility]          Physical Therapy Time and Intention    Document Type evaluation  -CM     Mode of Treatment physical therapy  -       Row Name 23 [facility]          General Information    Patient Profile Reviewed yes  -CM     Prior Level of Function independent:;community mobility;gait;driving;work  works as car   -     Barriers to Rehab ineffective coping  hx of etoh abuse; current substance abuse  -CM       Row Name 23 143          Living Environment    People in Home friend(s)  has roommate who also works as   -CM       Row Name 23 143          Home Main Entrance    Number of Stairs, Main Entrance other (see comments)  one full flight to 2nd floor apartment; pt estimates 15 stairs  -CM     Stair Railings, Main Entrance railings safe and in good condition  -CM       Row Name 23 1430          Stairs Within Home, Primary    Number of Stairs, Within Home, Primary none  -CM       Row Name 23 [facility]           Cognition    Orientation Status (Cognition) oriented x 4  -CM       Row Name 12/18/23 1432          Safety Issues, Functional Mobility    Impairments Affecting Function (Mobility) other (see comments)  feeding deficits; on dobb wai feeds currently  -CM               User Key  (r) = Recorded By, (t) = Taken By, (c) = Cosigned By      Initials Name Provider Type    Romi Cheney, PT Physical Therapist                   Mobility       Row Name 12/18/23 1432          Bed Mobility    Bed Mobility bed mobility (all) activities  -CM     All Activities, Hart (Bed Mobility) independent  -CM     Comment, (Bed Mobility) room extremely dark when PT entered, w/ all lights off and shades drawn. Explained to pt that he needs to get up out of bed and keep some lights on during day to maintain appropriate circadian rhythm. Pt also lying in < 30* elevation of head while getting dobb wai feeding. PT paused feeding for activity and immediately elevated head of bed to 30*.  -CM       Row Name 12/18/23 1432          Sit-Stand Transfer    Sit-Stand Hart (Transfers) independent  -CM       Row Name 12/18/23 1432          Gait/Stairs (Locomotion)    Hart Level (Gait) independent  -CM     Assistive Device (Gait) other (see comments)  gait belt, non skid socks  -CM     Patient was able to Ambulate yes  -CM     Distance in Feet (Gait) 240 ft; normal to rapid kenrick; PT pushing iv pole; pt has dobb wai feeding tube in place.  -CM               User Key  (r) = Recorded By, (t) = Taken By, (c) = Cosigned By      Initials Name Provider Type    Romi Cheney, PT Physical Therapist                   Obj/Interventions       Row Name 12/18/23 1435          Range of Motion Comprehensive    General Range of Motion no range of motion deficits identified  -CM       Row Name 12/18/23 1435          Strength Comprehensive (MMT)    General Manual Muscle Testing (MMT) Assessment no strength deficits identified  -CM        Row Name 12/18/23 1435          Balance    Balance Assessment sitting static balance;standing dynamic balance;sitting dynamic balance;standing static balance  -CM     Static Sitting Balance independent  -CM     Dynamic Sitting Balance independent  -CM     Position, Sitting Balance unsupported;sitting edge of bed  -CM     Static Standing Balance independent  -CM     Dynamic Standing Balance independent  -CM     Position/Device Used, Standing Balance unsupported  -CM       Row Name 12/18/23 1435          Sensory Assessment (Somatosensory)    Sensory Assessment (Somatosensory) sensation intact  -CM               User Key  (r) = Recorded By, (t) = Taken By, (c) = Cosigned By      Initials Name Provider Type    Romi Cheney, PT Physical Therapist                   Goals/Plan    No documentation.                  Clinical Impression       Row Name 12/18/23 1435          Pain    Pretreatment Pain Rating 7/10  -CM     Posttreatment Pain Rating 7/10  -CM     Pain Location generalized  -CM     Pain Location - abdomen  -CM     Pre/Posttreatment Pain Comment reports main pain is in abdomen, but also cites global pain  -CM     Pain Intervention(s) Medication (See MAR);Emotional support;Ambulation/increased activity;Repositioned  -CM       Row Name 12/18/23 1435          Plan of Care Review    Plan of Care Reviewed With patient;mother  -CM     Outcome Evaluation 42 yo male adm 11/30/23 for acute pancreatitis. UDS (+) on admission for cocaine and opiates. Hx of substance abuse & etoh abuse, but no significant etoh level in system at admission. PMH: difficulty feeding, severe malnutrition. At baseline, pt lives in second floor apartment w/ 15 stairs to enter and handrails. Single level once inside apartment. Shares apartment w/ a friend, whom he states is also a car . Pt reports he has lost his job since admission. Normally very active and independent. Today, pt is requiring dobb wai tube for feeding. Was found  in dark room w/ feeding in progress w/ head of bed elevated < 30*. PT immediately stopped feeding and elevated head of bed to 30*. Educated pt and his mother (who was present) on need to keep head elevated to prevent aspiration. Also educated on need to keep lights on in room during day to maintain appropriate circadian rhythm. Pt able to come to sit, collins his socks, come to stand, and amb 240 ft independently w/o significant gait deviations or loss of balance. Educated pt on importance of getting out of bed and detriments of bedrest. Repositioned pt in chair. Advised pt to stay in chair until after dinner time. Also to take walk in [place] w/ nsg 2-3 times daily to maintain strength and promote improved health. No need for skilled PT at this time. Recommend home when stable. Will sign off.  -CM       Row Name 12/18/23 1443          Therapy Assessment/Plan (PT)    Criteria for Skilled Interventions Met (PT) no;no problems identified which require skilled intervention  -CM     Therapy Frequency (PT) evaluation only  -CM       Row Name 12/18/23 1443          Vital Signs    O2 Delivery Pre Treatment room air  -CM     O2 Delivery Intra Treatment room air  -CM     O2 Delivery Post Treatment room air  -CM     Recovery Time VSS  -CM       Row Name 12/18/23 1443          Positioning and Restraints    Pre-Treatment Position in bed  -CM     Post Treatment Position chair  -CM     In Chair notified nsg;encouraged to call for assist;sitting;call light within reach;with family/caregiver  -               User Key  (r) = Recorded By, (t) = Taken By, (c) = Cosigned By      Initials Name Provider Type    Romi Cheney, PT Physical Therapist                   Outcome Measures       Row Name 12/18/23 1443 12/18/23 0800       How much help from another person do you currently need...    Turning from your back to your side while in flat bed without using bedrails? 4  -CM 4  -CF    Moving from lying on back to sitting on the side of  a flat bed without bedrails? 4  -CM 4  -CF    Moving to and from a bed to a chair (including a wheelchair)? 4  -CM 4  -CF    Standing up from a chair using your arms (e.g., wheelchair, bedside chair)? 4  -CM 3  -CF    Climbing 3-5 steps with a railing? 4  -CM 3  -CF    To walk in hospital room? 4  -CM 4  -CF    AM-PAC 6 Clicks Score (PT) 24  -CM 22  -CF    Highest Level of Mobility Goal 8 --> Walked 250 feet or more  -CM 7 --> Walk 25 feet or more  -CF              User Key  (r) = Recorded By, (t) = Taken By, (c) = Cosigned By      Initials Name Provider Type    CM Romi Shabazz, PT Physical Therapist    Ezequiel Gonzalez RN Registered Nurse                                 Physical Therapy Education       Title: PT OT SLP Therapies (Done)       Topic: Physical Therapy (Done)       Point: Mobility training (Done)       Learning Progress Summary             Patient Acceptance, E,TB, VU by CM at 12/18/2023 1444   Mother Acceptance, E,TB, VU by  at 12/18/2023 1444                                         User Key       Initials Effective Dates Name Provider Type Discipline     06/16/21 -  Romi Shabazz, PT Physical Therapist PT                  PT Recommendation and Plan     Plan of Care Reviewed With: patient, mother  Outcome Evaluation: 44 yo male adm 11/30/23 for acute pancreatitis. UDS (+) on admission for cocaine and opiates. Hx of substance abuse & etoh abuse, but no significant etoh level in system at admission. PMH: difficulty feeding, severe malnutrition. At baseline, pt lives in second floor apartment w/ 15 stairs to enter and handrails. Single level once inside apartment. Shares apartment w/ a friend, whom he states is also a car . Pt reports he has lost his job since admission. Normally very active and independent. Today, pt is requiring dobb wai tube for feeding. Was found in dark room w/ feeding in progress w/ head of bed elevated < 30*. PT immediately stopped feeding and elevated head of  bed to 30*. Educated pt and his mother (who was present) on need to keep head elevated to prevent aspiration. Also educated on need to keep lights on in room during day to maintain appropriate circadian rhythm. Pt able to come to sit, collins his socks, come to stand, and amb 240 ft independently w/o significant gait deviations or loss of balance. Educated pt on importance of getting out of bed and detriments of bedrest. Repositioned pt in chair. Advised pt to stay in chair until after dinner time. Also to take walk in [place] w/ nsg 2-3 times daily to maintain strength and promote improved health. No need for skilled PT at this time. Recommend home when stable. Will sign off.     Time Calculation:   PT Evaluation Complexity  History, PT Evaluation Complexity: 1-2 personal factors and/or comorbidities  Examination of Body Systems (PT Eval Complexity): total of 4 or more elements  Clinical Presentation (PT Evaluation Complexity): evolving  Clinical Decision Making (PT Evaluation Complexity): moderate complexity  Overall Complexity (PT Evaluation Complexity): moderate complexity     PT Charges       Row Name 12/18/23 1444             Time Calculation    Start Time 1309  -CM      Stop Time 1333  -CM      Time Calculation (min) 24 min  -CM      PT Received On 12/18/23  -CM         Time Calculation- PT    Total Timed Code Minutes- PT 0 minute(s)  -CM                User Key  (r) = Recorded By, (t) = Taken By, (c) = Cosigned By      Initials Name Provider Type    Romi Cheney, PT Physical Therapist                  Therapy Charges for Today       Code Description Service Date Service Provider Modifiers Qty    01841516467 HC PT EVAL MOD COMPLEXITY 4 12/18/2023 Romi Shabazz, PT GP 1            PT G-Codes  AM-PAC 6 Clicks Score (PT): 24  PT Discharge Summary  Anticipated Discharge Disposition (PT): home    Romi Shabazz PT  12/18/2023

## 2023-12-18 NOTE — PROGRESS NOTES
Chief complaint : Weakness     Subjective .     History of present illness:  The patient is a 43 y.o. male who was admitted secondary to abdominal pain. Diagnosed pancreatitis secondary to alcohol.   Medical hx includes alcohol and opioid abuse.   Was sober for 10 years but drank on 11[birth year]3 for his birthday per chart review.   Pt oriented x4  Mother at bedside   Pt reported 10/10 pain in abdominal area   Pt gave permission to talk with mother due to pain  Pt mother reports pt is in recovery for opioid and alcohol addiction.   She is not aware of any length of sobriety pt has maintained.  Previous inpatient treatment programs  Hx of depression and anxiety related to substance use.   Denies previous psychiatric hospitalizations   Denies symptoms of psychosis, oumou, hypomania  UDS positive for cocaine   Interview limited     Today   Pt sedated, interview limited   Ativan was added as well   Pt still requiring frequent pain medication        Review of Systems   Review of systems could not be obtained due to   patient sedation status.    The following portions of the patient's history were reviewed and updated as appropriate: allergies, current medications, past family history, past medical history, past social history, past surgical history and problem list.    History    Past psychiatric history     History reviewed. No pertinent past medical history.     History reviewed. No pertinent family history.     Social History     Tobacco Use    Smoking status: Every Day     Packs/day: 0.50     Years: 15.00     Additional pack years: 0.00     Total pack years: 7.50     Types: Cigarettes    Smokeless tobacco: Never   Vaping Use    Vaping Use: Never used   Substance Use Topics    Alcohol use: Not Currently    Drug use: Not Currently          Medications Prior to Admission   Medication Sig Dispense Refill Last Dose    indomethacin (INDOCIN) 50 MG capsule Take 1 capsule by mouth 3 (Three) Times a Day As Needed (pain). 30  "capsule 0     ondansetron (ZOFRAN) 4 MG tablet Take 1 tablet by mouth Every 6 (Six) Hours As Needed for Nausea or Vomiting. 60 tablet 0     oxyCODONE-acetaminophen (Percocet) 5-325 MG per tablet Take 1 tablet by mouth Every 6 (Six) Hours As Needed for Moderate Pain. 14 tablet 0     pantoprazole (PROTONIX) 40 MG EC tablet Take 1 tablet by mouth Every Morning. 30 tablet 0     polyethylene glycol (MIRALAX) 17 g packet Take 17 g by mouth Daily As Needed (Use if senna-docusate is ineffective). 30 each 0     sennosides-docusate (PERICOLACE) 8.6-50 MG per tablet Take 2 tablets by mouth 2 (Two) Times a Day. 30 tablet 0         Scheduled Meds:  fentaNYL, 1 patch, Transdermal, Q72H   And  Check Fentanyl Patch Placement, 1 each, Does not apply, Q12H  enoxaparin, 40 mg, Subcutaneous, Q24H  pantoprazole, 40 mg, Intravenous, Q AM  sodium chloride, 10 mL, Intravenous, Q12H         Continuous Infusions:  dextrose 5 % and sodium chloride 0.45 %, 100 mL/hr, Last Rate: 100 mL/hr (12/17/23 2200)        PRN Meds:    dextrose    dextrose    glucagon (human recombinant)    HYDROmorphone    LORazepam    ondansetron    sodium chloride    sodium chloride    sodium chloride      Allergies:  Patient has no known allergies.      Objective     Vital Signs   /63 (BP Location: Left arm, Patient Position: Lying)   Pulse 86   Temp 98.7 °F (37.1 °C) (Oral)   Resp 11   Ht 182.9 cm (72.01\")   Wt 59 kg (130 lb 1.1 oz)   SpO2 100%   BMI 17.64 kg/m²     Physical Exam:    Muscle strength and tone: moderate, ue's, equal bilaterally  Abnormal Movements: none observed   Gait: brandon     General Appearance:    Resting in bed eyes closed        Mental Status Exam:   Hygiene:   good  Cooperation:   unable to cooperate   Eye Contact:  Closed  Psychomotor Behavior:  Slow  Affect:  Restricted  Mood: anxious  Speech:  Minimal  Thought Process:  Goal directed  Thought Content:  Mood congruent  Suicidal:  None  Homicidal:  None  Hallucinations:  " None  Delusion:  None  Memory:  Intact  Orientation:  Person, Place, Time, and Situation  Reliability:   brandon  Insight:  Fair and Poor  Judgement:   fair   Impulse Control:  Fair  Physical/Medical Issues:  Yes          Medications and allergies reviewed     Result Review:  I have personally reviewed the results from the time of this admission to 12/18/2023 09:06 EST and agree with these findings:  [x]  Laboratory  []  Microbiology  []  Radiology  [x]  EKG/Telemetry   []  Cardiology/Vascular   []  Pathology  []  Old records  []  Other:      Assessment & Plan       Acute pancreatitis    Feeding difficulties    Severe malnutrition     Assessment: Substance abuse, anxiety related to medical condition      Treatment Plan: Psychiatry consulted to evaluate for anxiety.   Pt anxious related to pain and medical condition.   Severe pain 10/10   History of substance abuse   UDS positive for cocaine and opioids on admission   Per chart review pt having issues with constipation amitriptyline has anticholinergic activity, risk of ileus with this med and pain medications Amitriptyline dc  Initially increased xanax, ativan is being given now, will dc xanax   Pt lethargic, still requiring frequent pain medication   Will see when medically stable   Pt would likely benefit from CD treatment if agreeable   Interview limited due to pain, sedation   Will continue to provide support   Will follow prn     Treatment Plan discussed with: Patient and RN         I discussed the patients findings and my recommendations with patient and nursing staff    I have reviewed and approved the behavioral health treatment plans and problem list. Yes  Thank you for the consult   Referring MD has access to consult report and progress notes in EMR     Nataliia Damon DNP, APRN  12/18/23  09:06 EST

## 2023-12-18 NOTE — PROGRESS NOTES
"Nutrition Services    Patient Name: Dayron Manley  YOB: 1980  MRN: [id number]  Admission date: 11/30/2023    PROGRESS NOTE      Encounter Information: Progress note to check on TF. TF infusing as ordered, at goal.        PO Diet: NPO Diet NPO Type: Sips with Meds, Ice Chips   PO Supplements: None ordered   PO Intake:  NPO       Current nutrition support: Isosource 1.5 at 70 mL/hr + 10 mL q2 water flush    Nutrition support review: Infusing as ordered       Labs (reviewed below): Reviewed, management per attending. D5 fluids continue.         GI Function:  BM 12/12, ongoing constipation; GI following   Residuals N/A related to J-tube        Nutrition Intervention Updates: Continue TF at goal rate of 70mL/hour, with 10mL water flush q 2 hours.       Results from last 7 days   Lab Units 12/17/23  0332 12/16/23  0616 12/14/23  0401   SODIUM mmol/L 137 136 137   POTASSIUM mmol/L 4.1 4.3 4.4   CHLORIDE mmol/L 100 98 100   CO2 mmol/L 27.0 26.0 26.0   BUN mg/dL 18 13 9   CREATININE mg/dL 0.83 0.77 0.71*   CALCIUM mg/dL 8.7 9.4 9.5   BILIRUBIN mg/dL 0.4 0.4 0.3   ALK PHOS U/L 93 95 105   ALT (SGPT) U/L 27 20 14   AST (SGOT) U/L 25 26 16   GLUCOSE mg/dL 128* 107* 111*     Results from last 7 days   Lab Units 12/17/23  0332 12/16/23  0616   MAGNESIUM mg/dL  --  2.0   PHOSPHORUS mg/dL  --  4.4   HEMOGLOBIN g/dL 12.7* 13.5   HEMATOCRIT % 39.9 41.6     No results found for: \"COVID19\"  Lab Results   Component Value Date    HGBA1C 5.10 11/30/2023     RD to follow up per protocol.    Electronically signed by:  Asya Zavala RD  12/18/23 14:15 EST    "

## 2023-12-18 NOTE — PROGRESS NOTES
Hospitalist Progress Note   Dayron Manley : 1980 MRN:[id number] LOS:17     Principal Problem: Acute pancreatitis     Reason for follow up: All the medical problems listed below    Summary     Acute pancreatitis.    Subatance abuse     Assessment / Plan     Acute pancreatitis likely alcohol induced  Pancreatic pseudocyst  - CT scan done on 12/15/2023 showed creasing the site of pseudocyst 10.2 x 8.6 cm on the   current study as compared to 4.8 x 5.7  CT done on 2023   showing acute pancreatitis with fluid collection within the pancreatic neck likely representing pseudocyst.  Recommend free fluid in the pelvis related to pancreatitis and tracking inflammatory changes.  Bowels not dilated  -ESOPHAGOGASTRODUODENOSCOPY with NJ tube placement. Per GI - will continue nasojejunal tube feeds.  Continue full liquid diet.  Continue PPI and Creon.  Continue pain control with po hydromorphone  as needed  -Case management following for discharge planning to home with home health care     SINGH  Likely from volume depletion -improved     Constipation   Likely from pain medication continue bowel regimen  MiraLAX and senna     Substance abuse  Anxiety related to medical condition  Psychiatry team consult appreciated increased dose of Xanax; discontinued amitriptyline UDS was positive for cocaine and opiates on admission    Disposition: HH    Subjective / Review of systems     Review of Systems   Better     Objective / Physical Exam   Vital signs:  Temp: 98 °F (36.7 °C)  BP: 104/59  Heart Rate: 82  Resp: 17  SpO2: 100 %  Weight: 59 kg (130 lb 1.1 oz)    Admission Weight: Weight: 61.2 kg (134 lb 14.7 oz)  Current Weight: Weight: 59 kg (130 lb 1.1 oz)    Input/Output in last 24 hours:    Intake/Output Summary (Last 24 hours) at 2023 1313  Last data filed at 2023 1200  Gross per 24 hour   Intake 2385 ml   Output 1225 ml   Net 1160 ml      Physical Exam   Physical Exam  HENT:      Head: Normocephalic and  atraumatic.      Nose: Nose normal.   Eyes:      Extraocular Movements: Extraocular movements intact.      Conjunctivae/sclera: Conjunctivae normal.      Pupils: Pupils are equal, round, and reactive to light.   Cardiovascular:      Rate and Rhythm: normal       Pulses: Normal pulses.      Heart sounds: Normal heart sounds.   Pulmonary:      Effort: normal      Breath sounds: normal   Abdominal:    Tenderness +  Musculoskeletal:         General: Normal range of motion.      Cervical back: Normal range of motion and neck supple.   Skin:     General: Skin is dry.   Neurological:      General: No focal deficit present.      Mental Status: alert.   Psychiatric:         Mood and Affect: Mood normal.        Radiology and Labs     Results from last 7 days   Lab Units 12/17/23  0332 12/16/23  0616 12/14/23  0401 12/13/23  0420 12/12/23  0523   WBC 10*3/mm3 8.10 7.80 7.50 5.50 3.80   HEMATOCRIT % 39.9 41.6 41.4 35.7* 35.8*   PLATELETS 10*3/mm3 420 382 414 385 414      Results from last 7 days   Lab Units 12/17/23  0332 12/16/23  0616 12/14/23  0401 12/13/23  0420 12/12/23  0523   SODIUM mmol/L 137 136 137 136 139   POTASSIUM mmol/L 4.1 4.3 4.4 4.2 4.0   CHLORIDE mmol/L 100 98 100 102 102   CO2 mmol/L 27.0 26.0 26.0 27.0 29.0   BUN mg/dL 18 13 9 10 10   CREATININE mg/dL 0.83 0.77 0.71* 0.74* 0.77      Current medications   Scheduled Meds: fentaNYL, 1 patch, Transdermal, Q72H   And  Check Fentanyl Patch Placement, 1 each, Does not apply, Q12H  enoxaparin, 40 mg, Subcutaneous, Q24H  pantoprazole, 40 mg, Intravenous, Q AM  sodium chloride, 10 mL, Intravenous, Q12H      Continuous Infusions: dextrose 5 % and sodium chloride 0.45 %, 100 mL/hr, Last Rate: 100 mL/hr (12/18/23 0907)        Reviewed all other data in the last 24 hours, including but not limited to vitals, labs, microbiology, imaging and pertinent notes from other providers.     Igor Edwards MD   [facility]  12/18/23   13:13 EST

## 2023-12-19 ENCOUNTER — READMISSION MANAGEMENT (OUTPATIENT)
Dept: CALL CENTER | Facility: HOSPITAL | Age: 43
End: 2023-12-19
Payer: COMMERCIAL

## 2023-12-19 VITALS
WEIGHT: 136.91 LBS | BODY MASS INDEX: 18.54 KG/M2 | DIASTOLIC BLOOD PRESSURE: 66 MMHG | SYSTOLIC BLOOD PRESSURE: 94 MMHG | HEIGHT: 72 IN | OXYGEN SATURATION: 100 % | TEMPERATURE: 98.1 F | RESPIRATION RATE: 17 BRPM | HEART RATE: 98 BPM

## 2023-12-19 LAB
ALBUMIN SERPL-MCNC: 3 G/DL (ref 3.5–5.2)
ALBUMIN/GLOB SERPL: 1 G/DL
ALP SERPL-CCNC: 79 U/L (ref 39–117)
ALT SERPL W P-5'-P-CCNC: 20 U/L (ref 1–41)
AMYLASE SERPL-CCNC: 546 U/L (ref 28–100)
ANION GAP SERPL CALCULATED.3IONS-SCNC: 12 MMOL/L (ref 5–15)
ANISOCYTOSIS BLD QL: NORMAL
AST SERPL-CCNC: 24 U/L (ref 1–40)
BASOPHILS # BLD AUTO: 0 10*3/MM3 (ref 0–0.2)
BASOPHILS NFR BLD AUTO: 0.4 % (ref 0–1.5)
BILIRUB SERPL-MCNC: 0.2 MG/DL (ref 0–1.2)
BUN SERPL-MCNC: 11 MG/DL (ref 6–20)
BUN/CREAT SERPL: 16.4 (ref 7–25)
CALCIUM SPEC-SCNC: 8.5 MG/DL (ref 8.6–10.5)
CHLORIDE SERPL-SCNC: 100 MMOL/L (ref 98–107)
CO2 SERPL-SCNC: 24 MMOL/L (ref 22–29)
CREAT SERPL-MCNC: 0.67 MG/DL (ref 0.76–1.27)
CRP SERPL-MCNC: 3.1 MG/DL (ref 0–0.5)
DEPRECATED RDW RBC AUTO: 39.4 FL (ref 37–54)
EGFRCR SERPLBLD CKD-EPI 2021: 119.6 ML/MIN/1.73
EOSINOPHIL # BLD AUTO: 0.3 10*3/MM3 (ref 0–0.4)
EOSINOPHIL NFR BLD AUTO: 4.5 % (ref 0.3–6.2)
ERYTHROCYTE [DISTWIDTH] IN BLOOD BY AUTOMATED COUNT: 13.3 % (ref 12.3–15.4)
GLOBULIN UR ELPH-MCNC: 3 GM/DL
GLUCOSE BLDC GLUCOMTR-MCNC: 106 MG/DL (ref 70–105)
GLUCOSE BLDC GLUCOMTR-MCNC: 126 MG/DL (ref 70–105)
GLUCOSE BLDC GLUCOMTR-MCNC: 90 MG/DL (ref 70–105)
GLUCOSE SERPL-MCNC: 107 MG/DL (ref 65–99)
HCT VFR BLD AUTO: 35.2 % (ref 37.5–51)
HGB BLD-MCNC: 11.3 G/DL (ref 13–17.7)
LIPASE SERPL-CCNC: 1195 U/L (ref 13–60)
LYMPHOCYTES # BLD AUTO: 1.5 10*3/MM3 (ref 0.7–3.1)
LYMPHOCYTES NFR BLD AUTO: 21.7 % (ref 19.6–45.3)
MAGNESIUM SERPL-MCNC: 2.1 MG/DL (ref 1.6–2.6)
MCH RBC QN AUTO: 27 PG (ref 26.6–33)
MCHC RBC AUTO-ENTMCNC: 32.2 G/DL (ref 31.5–35.7)
MCV RBC AUTO: 83.8 FL (ref 79–97)
MICROCYTES BLD QL: NORMAL
MONOCYTES # BLD AUTO: 0.7 10*3/MM3 (ref 0.1–0.9)
MONOCYTES NFR BLD AUTO: 10 % (ref 5–12)
NEUTROPHILS NFR BLD AUTO: 4.3 10*3/MM3 (ref 1.7–7)
NEUTROPHILS NFR BLD AUTO: 63.4 % (ref 42.7–76)
NRBC BLD AUTO-RTO: 0.1 /100 WBC (ref 0–0.2)
PHOSPHATE SERPL-MCNC: 3.5 MG/DL (ref 2.5–4.5)
PLATELET # BLD AUTO: 309 10*3/MM3 (ref 140–450)
PMV BLD AUTO: 8.8 FL (ref 6–12)
POIKILOCYTOSIS BLD QL SMEAR: NORMAL
POTASSIUM SERPL-SCNC: 4.4 MMOL/L (ref 3.5–5.2)
PROT SERPL-MCNC: 6 G/DL (ref 6–8.5)
RBC # BLD AUTO: 4.2 10*6/MM3 (ref 4.14–5.8)
SMALL PLATELETS BLD QL SMEAR: ADEQUATE
SODIUM SERPL-SCNC: 136 MMOL/L (ref 136–145)
WBC MORPH BLD: NORMAL
WBC NRBC COR # BLD AUTO: 6.7 10*3/MM3 (ref 3.4–10.8)

## 2023-12-19 PROCEDURE — 86140 C-REACTIVE PROTEIN: CPT | Performed by: NURSE PRACTITIONER

## 2023-12-19 PROCEDURE — 82150 ASSAY OF AMYLASE: CPT | Performed by: NURSE PRACTITIONER

## 2023-12-19 PROCEDURE — 84100 ASSAY OF PHOSPHORUS: CPT | Performed by: INTERNAL MEDICINE

## 2023-12-19 PROCEDURE — 82948 REAGENT STRIP/BLOOD GLUCOSE: CPT

## 2023-12-19 PROCEDURE — 83735 ASSAY OF MAGNESIUM: CPT | Performed by: INTERNAL MEDICINE

## 2023-12-19 PROCEDURE — 85007 BL SMEAR W/DIFF WBC COUNT: CPT | Performed by: INTERNAL MEDICINE

## 2023-12-19 PROCEDURE — 85025 COMPLETE CBC W/AUTO DIFF WBC: CPT | Performed by: INTERNAL MEDICINE

## 2023-12-19 PROCEDURE — 83690 ASSAY OF LIPASE: CPT | Performed by: NURSE PRACTITIONER

## 2023-12-19 PROCEDURE — 80053 COMPREHEN METABOLIC PANEL: CPT | Performed by: INTERNAL MEDICINE

## 2023-12-19 RX ADMIN — OXYCODONE HYDROCHLORIDE 10 MG: 5 TABLET ORAL at 13:23

## 2023-12-19 RX ADMIN — Medication 10 ML: at 06:06

## 2023-12-19 RX ADMIN — Medication 10 ML: at 09:10

## 2023-12-19 RX ADMIN — OXYCODONE HYDROCHLORIDE 10 MG: 5 TABLET ORAL at 09:09

## 2023-12-19 RX ADMIN — PANTOPRAZOLE SODIUM 40 MG: 40 INJECTION, POWDER, FOR SOLUTION INTRAVENOUS at 06:06

## 2023-12-19 RX ADMIN — OXYCODONE HYDROCHLORIDE 10 MG: 5 TABLET ORAL at 04:30

## 2023-12-19 NOTE — CASE MANAGEMENT/SOCIAL WORK
Case Management Discharge Note      Final Note: Home with [facility] for NJ tube feeding supplies         Selected Continued Care - Discharged on 12/19/2023 Admission date: 11/30/2023 - Discharge disposition: Home or Self Care     Transportation Services  Private: Car    Final Discharge Disposition Code: 01 - home or self-care

## 2023-12-19 NOTE — CASE MANAGEMENT/SOCIAL WORK
Continued Stay Note  [facility]     Patient Name: Dayron Manley  MRN: [id number]  Today's Date: 12/19/2023    Admit Date: 11/30/2023    Plan: Return home alone. Meds to Bed. [facility] for NJ tube feedings   Discharge Plan       Row Name 12/19/23 0749       Plan    Plan Comments Mr. Manley  has discharge orders for today. CM contacted Lluvia with [facility] and informed her of  discharge orders and asked when NJT education will be attempted today and when tube feeding supplies will be delivered. Lluvia said delivery of pump and education will be done today around 1200 noon and he can be hooked up to their pump when he discharges. CM informed nursing.                        Phone communication or documentation only - no physical contact with patient or family.   Lorena DUGAN,RN Case Manager  [facility]  Phone: Desk- [phone] cell- [phone]

## 2023-12-19 NOTE — CASE MANAGEMENT/SOCIAL WORK
Continued Stay Note   [facility]     Patient Name: Dayron Manley  MRN: [id number]  Today's Date: 12/19/2023    Admit Date: 11/30/2023    Plan: [place] home alone. [facility] for NJ tube feedings.   Discharge Plan       Row Name 12/19/23 1409       Plan    Plan [place] home alone. [facility] for NJ tube feedings.    Plan Comments [facility] met with pt at bedside today  to educate on NJ tube feeds. Pt participated in education per Lluvia from [facility]. Feeding tube pump delivered to pt at bedside.             Jody Herrera RN      Office phone: [phone]  Office fax: [phone]

## 2023-12-19 NOTE — NURSING NOTE
Pulled Lorazepam at 2039, however, pt was upset and did not want to take the medication at this time. So it was not administered until 2236.    constant

## 2023-12-19 NOTE — NURSING NOTE
Patient discharged today to home with feeding pump, IV pole and 4 bags of feedings. . Parent (mom) accompanied. Transported home via private transport with mother. Participated in discharge teaching with [facility] on NJ care and feeds. Vss upon discharge. Pt had signed up for meds to beds. Discharge order was put in on 12/18/23 prematurely before  could verify home care. Due to this, pharmacy delivered meds to beds to patient on night shift. Did not notify staff of delivery. Pt sat in his room all night with fentanyl patches, roxicodone  and zofrans. Staff unaware. When discharged 12/19/23, this nurse found the medications to be on patient after speaking with pharmacy. Pt had secretly taken 7 roxicodone throughout the night. When confronted pt while standing outside, just stated that he hadnt touched anything. He also sat in room while this nurse discussed picking up the meds downstairs and never once said a word about already being delivered. Reported to safety and manager aware.

## 2023-12-19 NOTE — DISCHARGE SUMMARY
Discharge Note   Dayron Manley 1980 [id number]     Date of Admission:11/30/2023     Date of Discharge: 12/19/23     Admission Diagnosis: Acute pancreatitis [K85.90]  SINGH (acute kidney injury) [N17.9]  Acute pancreatitis, unspecified complication status, unspecified pancreatitis type [K85.90]  Left upper quadrant abdominal pain [R10.12]     Discharge Diagnosis:     Acute pancreatitis    Feeding difficulties    Severe malnutrition       Consults: GI    Hospital Course:     Dayron Manley is a 43 y.o. male with no significant past medical history who presented to [facility] on 11/30/2023 with abdominal pain.  Of note, patient was recently admitted 11/19 and discharged on 11/24 after being treated for acute pancreatitis.  Pt used to be a heavy drinker a decade ago. He quit drinking excessively about 10 years ago and only drinks rarely on occasions with the last drink being on his birthday, [birth year].  CT abdomen shows acute pancreatitis with new fluid collection seen within the pancreatic neck and body likely representing pseudocyst. SINGH present and improved with IV fluid.  GI consulted and NJ tube is inserted for nutrition purposes.  Patient has been on IV pain medication throughout his hospital stay for severe abdominal pain later controlled fairly with fentanyl patch and oral oxycodone. The pseudocyst drainage cannot be done during the hospitalization as it is not mature enough to drain per the GI team and rec to continue with NJ tube feeing on dc.  Patient is discharged to home with NJ tube feeding - and follow up with GI team in 2 weeks for reassessment for pseudocyst drainage.          Vitals:    12/19/23 1151   BP: 94/66   Pulse: 98   Resp:    Temp: 98.1 °F (36.7 °C)   SpO2: 100%        Disposition: home      Discharged Condition: fair    Activity: activity as tolerated    Diet:  No active diet order       Labs:    Results from last 7 days   Lab Units 12/19/23  0652 12/17/23  0338  12/16/23  0616 12/14/23  0401 12/13/23  0420   WBC 10*3/mm3 6.70 8.10 7.80 7.50 5.50   HEMOGLOBIN g/dL 11.3* 12.7* 13.5 13.3 11.8*   HEMATOCRIT % 35.2* 39.9 41.6 41.4 35.7*   PLATELETS 10*3/mm3 309 420 382 414 385       Results from last 7 days   Lab Units 12/19/23  0652 12/17/23  0332 12/16/23  0616 12/14/23  0401 12/13/23  0420   SODIUM mmol/L 136 137 136 137 136   POTASSIUM mmol/L 4.4 4.1 4.3 4.4 4.2   CHLORIDE mmol/L 100 100 98 100 102   CO2 mmol/L 24.0 27.0 26.0 26.0 27.0   BUN mg/dL 11 18 13 9 10   CREATININE mg/dL 0.67* 0.83 0.77 0.71* 0.74*                  Discharge Medications        New Medications        Instructions Start Date   fentaNYL 12 MCG/HR  Commonly known as: DURAGESIC   1 patch, Transdermal, Every 72 Hours      oxyCODONE 10 MG tablet  Commonly known as: ROXICODONE   10 mg, Oral, Every 4 Hours PRN             Continue These Medications        Instructions Start Date   ondansetron 4 MG tablet  Commonly known as: ZOFRAN   4 mg, Oral, Every 6 Hours PRN      pantoprazole 40 MG EC tablet  Commonly known as: PROTONIX   40 mg, Oral, Every Early Morning      polyethylene glycol 17 g packet  Commonly known as: MIRALAX   17 g, Oral, Daily PRN      Stool Softener Plus Laxative 8.6-50 MG per tablet  Generic drug: sennosides-docusate   2 tablets, Oral, 2 Times Daily             Stop These Medications      indomethacin 50 MG capsule  Commonly known as: INDOCIN     oxyCODONE-acetaminophen 5-325 MG per tablet  Commonly known as: Percocet               Spent at least 35 minutes in the management of patient's care including but not limited to physical exam, review of vital signs, labs, cultures and imaging studies, discussing the hospital stay along with plan of care at home, preparation and coordinating of discharge, arranging follow up care and referrals as indicated. Plan also discussed with ANDREAS.    Igor Edwards MD  Hospitalist  12/19/23   16:27 EST

## 2023-12-19 NOTE — PLAN OF CARE
Problem: Pain Acute  Goal: Acceptable Pain Control and Functional Ability  Outcome: Ongoing, Progressing  Intervention: Prevent or Manage Pain  Recent Flowsheet Documentation  Taken 12/18/2023 2000 by Pearl Pearson RN  Sensory Stimulation Regulation: television on  Sleep/Rest Enhancement:   awakenings minimized   noise level reduced   room darkened  Medication Review/Management: medications reviewed  Intervention: Optimize Psychosocial Wellbeing  Recent Flowsheet Documentation  Taken 12/18/2023 2000 by Pearl Pearson, RN  Supportive Measures: active listening utilized  Diversional Activities:   television   smartphone     Problem: Aspiration (Enteral Nutrition)  Goal: Absence of Aspiration Signs and Symptoms  Outcome: Ongoing, Progressing  Intervention: Minimize Aspiration Risk  Recent Flowsheet Documentation  Taken 12/18/2023 2000 by Pearl Pearson, RN  Head of Bed (HOB) Positioning: HOB at 30 degrees  Enteral Feeding Safety: tubing labeled as enteral feeding     Problem: Adult Inpatient Plan of Care  Goal: Readiness for Transition of Care  Outcome: Ongoing, Progressing   Goal Outcome Evaluation:    Pt did well overnight. Pain medication only administered 2 times during the shift. Pt rested comfortably. Will continue to monitor.

## 2023-12-20 ENCOUNTER — HOSPITAL ENCOUNTER (OUTPATIENT)
Facility: HOSPITAL | Age: 43
Setting detail: HOSPITAL OUTPATIENT SURGERY
Discharge: HOME | End: 2023-12-20
Attending: INTERNAL MEDICINE | Admitting: INTERNAL MEDICINE
Payer: COMMERCIAL

## 2023-12-20 ENCOUNTER — APPOINTMENT (OUTPATIENT)
Dept: GENERAL RADIOLOGY | Facility: HOSPITAL | Age: 43
End: 2023-12-20
Payer: COMMERCIAL

## 2023-12-20 ENCOUNTER — ANESTHESIA (OUTPATIENT)
Dept: GASTROENTEROLOGY | Facility: HOSPITAL | Age: 43
End: 2023-12-20
Payer: COMMERCIAL

## 2023-12-20 ENCOUNTER — ANESTHESIA EVENT (OUTPATIENT)
Dept: GASTROENTEROLOGY | Facility: HOSPITAL | Age: 43
End: 2023-12-20
Payer: COMMERCIAL

## 2023-12-20 VITALS
HEART RATE: 86 BPM | DIASTOLIC BLOOD PRESSURE: 74 MMHG | OXYGEN SATURATION: 97 % | WEIGHT: 140.65 LBS | BODY MASS INDEX: 19.05 KG/M2 | TEMPERATURE: 98.3 F | RESPIRATION RATE: 13 BRPM | HEIGHT: 72 IN | SYSTOLIC BLOOD PRESSURE: 114 MMHG

## 2023-12-20 PROCEDURE — 25010000002 FENTANYL CITRATE (PF) 100 MCG/2ML SOLUTION: Performed by: NURSE ANESTHETIST, CERTIFIED REGISTERED

## 2023-12-20 PROCEDURE — 25810000003 SODIUM CHLORIDE 0.9 % SOLUTION: Performed by: NURSE ANESTHETIST, CERTIFIED REGISTERED

## 2023-12-20 PROCEDURE — 25010000002 FENTANYL CITRATE (PF) 50 MCG/ML SOLUTION

## 2023-12-20 PROCEDURE — 25010000002 PROPOFOL 500 MG/50ML EMULSION: Performed by: NURSE ANESTHETIST, CERTIFIED REGISTERED

## 2023-12-20 PROCEDURE — 25010000002 PROPOFOL 200 MG/20ML EMULSION: Performed by: NURSE ANESTHETIST, CERTIFIED REGISTERED

## 2023-12-20 PROCEDURE — 76000 FLUOROSCOPY <1 HR PHYS/QHP: CPT

## 2023-12-20 RX ORDER — FENTANYL CITRATE 50 UG/ML
25 INJECTION, SOLUTION INTRAMUSCULAR; INTRAVENOUS
Status: DISCONTINUED | OUTPATIENT
Start: 2023-12-20 | End: 2023-12-20

## 2023-12-20 RX ORDER — SODIUM CHLORIDE 9 MG/ML
INJECTION, SOLUTION INTRAVENOUS CONTINUOUS PRN
Status: DISCONTINUED | OUTPATIENT
Start: 2023-12-20 | End: 2023-12-20 | Stop reason: SURG

## 2023-12-20 RX ORDER — FENTANYL CITRATE 50 UG/ML
INJECTION, SOLUTION INTRAMUSCULAR; INTRAVENOUS AS NEEDED
Status: DISCONTINUED | OUTPATIENT
Start: 2023-12-20 | End: 2023-12-20 | Stop reason: SURG

## 2023-12-20 RX ORDER — LIDOCAINE HYDROCHLORIDE 20 MG/ML
INJECTION, SOLUTION EPIDURAL; INFILTRATION; INTRACAUDAL; PERINEURAL AS NEEDED
Status: DISCONTINUED | OUTPATIENT
Start: 2023-12-20 | End: 2023-12-20 | Stop reason: SURG

## 2023-12-20 RX ORDER — ONDANSETRON 2 MG/ML
4 INJECTION INTRAMUSCULAR; INTRAVENOUS ONCE AS NEEDED
Status: DISCONTINUED | OUTPATIENT
Start: 2023-12-20 | End: 2023-12-20 | Stop reason: HOSPADM

## 2023-12-20 RX ORDER — FENTANYL CITRATE 50 UG/ML
INJECTION, SOLUTION INTRAMUSCULAR; INTRAVENOUS
Status: COMPLETED
Start: 2023-12-20 | End: 2023-12-20

## 2023-12-20 RX ORDER — ACETAMINOPHEN 325 MG/1
650 TABLET ORAL ONCE AS NEEDED
Status: DISCONTINUED | OUTPATIENT
Start: 2023-12-20 | End: 2023-12-20 | Stop reason: HOSPADM

## 2023-12-20 RX ORDER — PROPOFOL 10 MG/ML
INJECTION, EMULSION INTRAVENOUS CONTINUOUS PRN
Status: DISCONTINUED | OUTPATIENT
Start: 2023-12-20 | End: 2023-12-20 | Stop reason: SURG

## 2023-12-20 RX ORDER — LABETALOL HYDROCHLORIDE 5 MG/ML
5 INJECTION, SOLUTION INTRAVENOUS
Status: DISCONTINUED | OUTPATIENT
Start: 2023-12-20 | End: 2023-12-20 | Stop reason: HOSPADM

## 2023-12-20 RX ORDER — FENTANYL CITRATE 50 UG/ML
50 INJECTION, SOLUTION INTRAMUSCULAR; INTRAVENOUS
Status: DISCONTINUED | OUTPATIENT
Start: 2023-12-20 | End: 2023-12-20 | Stop reason: HOSPADM

## 2023-12-20 RX ORDER — HYDRALAZINE HYDROCHLORIDE 20 MG/ML
5 INJECTION INTRAMUSCULAR; INTRAVENOUS
Status: DISCONTINUED | OUTPATIENT
Start: 2023-12-20 | End: 2023-12-20 | Stop reason: HOSPADM

## 2023-12-20 RX ORDER — PROPOFOL 10 MG/ML
INJECTION, EMULSION INTRAVENOUS AS NEEDED
Status: DISCONTINUED | OUTPATIENT
Start: 2023-12-20 | End: 2023-12-20 | Stop reason: SURG

## 2023-12-20 RX ORDER — IPRATROPIUM BROMIDE AND ALBUTEROL SULFATE 2.5; .5 MG/3ML; MG/3ML
3 SOLUTION RESPIRATORY (INHALATION) ONCE AS NEEDED
Status: DISCONTINUED | OUTPATIENT
Start: 2023-12-20 | End: 2023-12-20 | Stop reason: HOSPADM

## 2023-12-20 RX ADMIN — FENTANYL CITRATE 100 MCG: 50 INJECTION, SOLUTION INTRAMUSCULAR; INTRAVENOUS at 15:45

## 2023-12-20 RX ADMIN — FENTANYL CITRATE 50 MCG: 50 INJECTION, SOLUTION INTRAMUSCULAR; INTRAVENOUS at 16:19

## 2023-12-20 RX ADMIN — PROPOFOL 50 MG: 10 INJECTION, EMULSION INTRAVENOUS at 15:45

## 2023-12-20 RX ADMIN — LIDOCAINE HYDROCHLORIDE 100 MG: 20 INJECTION, SOLUTION EPIDURAL; INFILTRATION; INTRACAUDAL; PERINEURAL at 15:45

## 2023-12-20 RX ADMIN — PROPOFOL 175 MCG/KG/MIN: 10 INJECTION, EMULSION INTRAVENOUS at 15:46

## 2023-12-20 RX ADMIN — SODIUM CHLORIDE: 9 INJECTION, SOLUTION INTRAVENOUS at 15:40

## 2023-12-20 NOTE — OUTREACH NOTE
Prep Survey      Flowsheet Row Responses   [facility] facility patient discharged from? [facility]   Is LACE score < 7 ? No   Eligibility Readm Mgmt   Discharge diagnosis Acute pancreatitis with jejunal tube insertion   Does the patient have one of the following disease processes/diagnoses(primary or secondary)? Other   Does the patient have Home health ordered? No   Is there a DME ordered? No   Medication alerts for this patient NJ tube feeds   Prep survey completed? Yes            ANGELA JOEL - Registered Nurse

## 2023-12-20 NOTE — DISCHARGE INSTRUCTIONS
A responsible adult should stay with you and you should rest quietly for the rest of the day.    Do not drink alcohol, drive, operate any heavy machinery or power tools or make any legal/important decisions for the next 24 hours.     Progress your diet as tolerated.  If you begin to experience severe pain, increased shortness of breath, racing heartbeat or a fever above 101 F, seek immediate medical attention.     Follow up with MD as instructed. Call office for results in 3 to 5 days if needed.     Dr Mendoza [phone]    Impression:  Successful NJ tube placement  Retained gastric secretion and fluid was suction  Bulge was noticed from pseudocyst in the antrum and duodenal bulb     Recommendations:  Okay to start using NJ tube.  Continue with the supportive care for pancreatitis.  Patient will need EUS with Axios stent placement 2  weeks once cyst is mature, we may also consider MRCP to rule out any pancreatic duct obstruction.  He will follow-up in the office in 1 to 2 weeks

## 2023-12-20 NOTE — ANESTHESIA POSTPROCEDURE EVALUATION
Patient: Dayron Manley    Procedure Summary       Date: 12/20/23 Room / Location: [facility] ENDOSCOPY 2 / [facility] ENDOSCOPY    Anesthesia Start: 1540 Anesthesia Stop: 1605    Procedure: NASAL JEJUNAL FEEDING TUBE PLACEMENT Diagnosis: (CLOGGED NASOJEJUNAL TUBE)    Surgeons: Luan Mendoza MD Provider: Lakhwinder Hernandez MD    Anesthesia Type: general ASA Status: 3            Anesthesia Type: general    Vitals  Vitals Value Taken Time   /67 12/20/23 1629   Temp     Pulse 82 12/20/23 1633   Resp 13 12/20/23 1625   SpO2 98 % 12/20/23 1633   Vitals shown include unfiled device data.        Post Anesthesia Care and Evaluation    Patient location during evaluation: PACU  Patient participation: complete - patient participated  Level of consciousness: awake  Pain management: adequate    Airway patency: patent  Anesthetic complications: No anesthetic complications  PONV Status: none  Cardiovascular status: acceptable  Respiratory status: acceptable  Hydration status: acceptable    Comments: Patient seen and examined postoperatively; vital signs stable; SpO2 greater than or equal to 90%; cardiopulmonary status stable; nausea/vomiting adequately controlled; pain adequately controlled; no apparent anesthesia complications; patient discharged from anesthesia care when discharge criteria were met

## 2023-12-20 NOTE — ANESTHESIA PREPROCEDURE EVALUATION
Anesthesia Evaluation     Patient summary reviewed and Nursing notes reviewed                Airway   Mallampati: I  TM distance: >3 FB  Neck ROM: full  No difficulty expected  Dental - normal exam     Pulmonary - negative pulmonary ROS and normal exam   Cardiovascular - negative cardio ROS and normal exam        Neuro/Psych- negative ROS  GI/Hepatic/Renal/Endo - negative ROS     Musculoskeletal (-) negative ROS    Abdominal  - normal exam    Bowel sounds: normal.   Substance History - negative use     OB/GYN negative ob/gyn ROS         Other        ROS/Med Hx Other: PANCREATITIS  DURAGESIC PATCH              Anesthesia Plan    ASA 3     general     intravenous induction     Anesthetic plan, risks, benefits, and alternatives have been provided, discussed and informed consent has been obtained with: patient.  Pre-procedure education provided  Plan discussed with CRNA.    CODE STATUS:

## 2023-12-20 NOTE — OP NOTE
NASAL JEJUNAL FEEDING TUBE PLACEMENT Procedure Report    Patient Name:  Dayron Manley  YOB: 1980    Date of Surgery:  12/20/2023     Pre-Op Diagnosis:  CLOGGED NASOJEJUNAL TUBE     Patient here again with a clogged NJ tube  Procedure(s):  ESOPHAGOGASTRODUODENOSCOPY WITH JEJUNAL TUBE INSERTION     Staff:  Surgeon(s):  Luan Mendoza MD        Anesthesia: Monitored Anesthesia Care     Description of Procedure:  A description of the procedure as well as risks, benefits and alternative methods were explained to the patient who voiced understanding and signed the corresponding consent form. A physical exam was performed and vital signs were monitored throughout the procedure.     An upper GI endoscope was placed into the mouth and proceeded through the esophagus, stomach and second portion of the duodenum without difficulty. The scope was then retroflexed and the fundus was visualized. The procedure was not difficult and there were no immediate complications.  Scope advanced all the way to proximal jejunum.  Fluoroscopy was used, wire was placed through the scope, slowly withdrawn over the scope subsequently NJ tube was placed in left nares secured with bridle.  Patient Toller procedure very well.  Large amount of fluid was suctioned from the stomach bulge was noticed in the antrum and duodenal bulb area from large pseudocyst       Impression:  Successful NJ tube placement  Retained gastric secretion and fluid was suction  Bulge was noticed from pseudocyst in the antrum and duodenal bulb     Recommendations:  Okay to start using NJ tube.  Continue with the supportive care for pancreatitis.  Patient will need EUS with Axios stent placement 2  weeks once cyst is mature, we may also consider MRCP to rule out any pancreatic duct obstruction.  He will follow-up in the office in 1 to 2 weeks

## 2023-12-21 ENCOUNTER — READMISSION MANAGEMENT (OUTPATIENT)
Dept: CALL CENTER | Facility: HOSPITAL | Age: 43
End: 2023-12-21
Payer: COMMERCIAL

## 2023-12-21 NOTE — OUTREACH NOTE
Medical Week 1 Survey      Flowsheet Row Responses   [facility] facility patient discharged from? [facility]   Does the patient have one of the following disease processes/diagnoses(primary or secondary)? Other   Week 1 attempt successful? No   Unsuccessful attempts Attempt 1            DINORA OJEDA - Registered Nurse

## 2023-12-27 ENCOUNTER — READMISSION MANAGEMENT (OUTPATIENT)
Dept: CALL CENTER | Facility: HOSPITAL | Age: 43
End: 2023-12-27
Payer: COMMERCIAL

## 2023-12-27 NOTE — OUTREACH NOTE
Medical Week 1 Survey      Flowsheet Row Responses   [facility] facility patient discharged from? [facility]   Does the patient have one of the following disease processes/diagnoses(primary or secondary)? Other   Week 1 attempt successful? No   Unsuccessful attempts Attempt 2            Lizbet DEVINE - Licensed Nurse

## 2023-12-28 ENCOUNTER — ON CAMPUS - OUTPATIENT (OUTPATIENT)
Dept: URBAN - METROPOLITAN AREA HOSPITAL 77 | Facility: HOSPITAL | Age: 43
End: 2023-12-28
Payer: COMMERCIAL

## 2023-12-28 DIAGNOSIS — K85.20 ALCOHOL INDUCED ACUTE PANCREATITIS WITHOUT NECROSIS OR INFEC: ICD-10-CM

## 2023-12-28 DIAGNOSIS — K29.70 GASTRITIS, UNSPECIFIED, WITHOUT BLEEDING: ICD-10-CM

## 2023-12-28 DIAGNOSIS — K29.80 DUODENITIS WITHOUT BLEEDING: ICD-10-CM

## 2023-12-28 PROCEDURE — 43241 EGD TUBE/CATH INSERTION: CPT | Performed by: INTERNAL MEDICINE

## 2024-01-02 NOTE — H&P
Patient Care Team:  Nancy El APRN as PCP - General (Family Medicine)      Subjective .     History of present illness:    Dayron Manley is a 43 y.o. male who presents today for Procedure(s):  NASAL JEJUNAL FEEDING TUBE PLACEMENT for the indications listed below.     The updated Patient Profile was reviewed prior to the procedure, in conjunction with the Physical Exam, including medical conditions, surgical procedures, medications, allergies, family history and social history.     Pre-operatively, I reviewed the indication(s) for the procedure, the risks of the procedure [including but not limited to: unexpected bleeding possibly requiring hospitalization and/or unplanned repeat procedures, perforation possibly requiring surgical treatment, missed lesions and complications of sedation/MAC (also explained by anesthesia staff)].     I have evaluated the patient for risks associated with the planned anesthesia and the procedure to be performed and find the patient an acceptable candidate for IV sedation.    Multiple opportunities were provided for any questions or concerns, and all questions were answered satisfactorily before any anesthesia was administered. We will proceed with the planned procedure.      ASSESSMENT/PLAN:  43 years old male for NJ tube placement for acute pancreatitis with a pseudocyst      Past Medical History:  History reviewed. No pertinent past medical history.    Past Surgical History:  Past Surgical History:   Procedure Laterality Date    ENDOSCOPY N/A 12/8/2023    Procedure: ESOPHAGOGASTRODUODENOSCOPY with NJ tube placement;  Surgeon: Luan Mendoza MD;  Location: [facility] ENDOSCOPY;  Service: Gastroenterology;  Laterality: N/A;    ENDOSCOPY WITH JTUBE N/A 12/16/2023    Procedure: ESOPHAGOGASTRODUODENOSCOPY WITH JEJUNAL TUBE INSERTION;  Surgeon: Luan Mendoza MD;  Location: [facility] ENDOSCOPY;  Service: Gastroenterology;  Laterality: N/A;    JEJUNOSTOMY TUBE INSERTION N/A 12/20/2023     Procedure: NASAL JEJUNAL FEEDING TUBE PLACEMENT;  Surgeon: Luan Mendoza MD;  Location: [facility] ENDOSCOPY;  Service: Gastroenterology;  Laterality: N/A;  Post- NJ tube placement       Social History:  Social History     Tobacco Use    Smoking status: Every Day     Packs/day: 0.50     Years: 15.00     Additional pack years: 0.00     Total pack years: 7.50     Types: Cigarettes    Smokeless tobacco: Never   Vaping Use    Vaping Use: Never used   Substance Use Topics    Alcohol use: Not Currently    Drug use: Not Currently       Family History:  History reviewed. No pertinent family history.    Medications:  No medications prior to admission.       Scheduled Meds:  Continuous Infusions:No current facility-administered medications for this encounter.    PRN Meds:.    ALLERGIES:  Patient has no known allergies.        Objective     Vital Signs:        Physical Exam:      General Appearance:    Awake and alert, in no acute distress   Lungs:     Clear to auscultation bilaterally, respirations regular, even and unlabored    Heart:    Regular rhythm and normal rate, normal S1 and S2, no            murmur, no gallop, no rub   Abdomen:     Normal bowel sounds, soft, non-tender, no rebound or guarding, non-distended, no hepatosplenomegaly        Results Review:   I reviewed the patient's labs and imaging.  Lab Results (last 24 hours)       ** No results found for the last 24 hours. **            Imaging Results (Last 24 Hours)       ** No results found for the last 24 hours. **               I discussed the patients findings and my recommendations with the patient.  Luan Mendoza MD  01/01/24  22:38 EST

## 2024-01-10 ENCOUNTER — READMISSION MANAGEMENT (OUTPATIENT)
Dept: CALL CENTER | Facility: HOSPITAL | Age: 44
End: 2024-01-10
Payer: COMMERCIAL

## 2024-01-12 ENCOUNTER — ON CAMPUS - OUTPATIENT (OUTPATIENT)
Dept: URBAN - METROPOLITAN AREA HOSPITAL 77 | Facility: HOSPITAL | Age: 44
End: 2024-01-12

## 2024-01-12 ENCOUNTER — INPATIENT HOSPITAL (OUTPATIENT)
Dept: URBAN - METROPOLITAN AREA HOSPITAL 76 | Facility: HOSPITAL | Age: 44
End: 2024-01-12

## 2024-01-12 DIAGNOSIS — R10.9 UNSPECIFIED ABDOMINAL PAIN: ICD-10-CM

## 2024-01-12 DIAGNOSIS — K86.3 PSEUDOCYST OF PANCREAS: ICD-10-CM

## 2024-01-12 PROCEDURE — 99222 1ST HOSP IP/OBS MODERATE 55: CPT

## 2024-01-12 PROCEDURE — 43242 EGD US FINE NEEDLE BX/ASPIR: CPT | Performed by: INTERNAL MEDICINE

## 2024-01-13 ENCOUNTER — INPATIENT HOSPITAL (OUTPATIENT)
Dept: URBAN - METROPOLITAN AREA HOSPITAL 76 | Facility: HOSPITAL | Age: 44
End: 2024-01-13

## 2024-01-13 DIAGNOSIS — K86.3 PSEUDOCYST OF PANCREAS: ICD-10-CM

## 2024-01-13 DIAGNOSIS — R11.2 NAUSEA WITH VOMITING, UNSPECIFIED: ICD-10-CM

## 2024-01-13 DIAGNOSIS — R10.9 UNSPECIFIED ABDOMINAL PAIN: ICD-10-CM

## 2024-01-13 DIAGNOSIS — K85.20 ALCOHOL INDUCED ACUTE PANCREATITIS WITHOUT NECROSIS OR INFEC: ICD-10-CM

## 2024-01-13 PROCEDURE — 99232 SBSQ HOSP IP/OBS MODERATE 35: CPT | Performed by: INTERNAL MEDICINE

## 2024-01-14 ENCOUNTER — INPATIENT HOSPITAL (OUTPATIENT)
Dept: URBAN - METROPOLITAN AREA HOSPITAL 76 | Facility: HOSPITAL | Age: 44
End: 2024-01-14

## 2024-01-14 DIAGNOSIS — R11.2 NAUSEA WITH VOMITING, UNSPECIFIED: ICD-10-CM

## 2024-01-14 DIAGNOSIS — R10.9 UNSPECIFIED ABDOMINAL PAIN: ICD-10-CM

## 2024-01-14 DIAGNOSIS — K86.3 PSEUDOCYST OF PANCREAS: ICD-10-CM

## 2024-01-14 DIAGNOSIS — K85.20 ALCOHOL INDUCED ACUTE PANCREATITIS WITHOUT NECROSIS OR INFEC: ICD-10-CM

## 2024-01-14 PROCEDURE — 99232 SBSQ HOSP IP/OBS MODERATE 35: CPT | Performed by: INTERNAL MEDICINE

## 2024-01-15 ENCOUNTER — INPATIENT HOSPITAL (OUTPATIENT)
Dept: URBAN - METROPOLITAN AREA HOSPITAL 76 | Facility: HOSPITAL | Age: 44
End: 2024-01-15

## 2024-01-15 DIAGNOSIS — R11.2 NAUSEA WITH VOMITING, UNSPECIFIED: ICD-10-CM

## 2024-01-15 DIAGNOSIS — K86.3 PSEUDOCYST OF PANCREAS: ICD-10-CM

## 2024-01-15 DIAGNOSIS — K85.90 ACUTE PANCREATITIS WITHOUT NECROSIS OR INFECTION, UNSPECIFIE: ICD-10-CM

## 2024-01-15 DIAGNOSIS — R10.9 UNSPECIFIED ABDOMINAL PAIN: ICD-10-CM

## 2024-01-15 PROCEDURE — 99233 SBSQ HOSP IP/OBS HIGH 50: CPT | Performed by: INTERNAL MEDICINE

## 2024-01-16 ENCOUNTER — INPATIENT HOSPITAL (OUTPATIENT)
Dept: URBAN - METROPOLITAN AREA HOSPITAL 76 | Facility: HOSPITAL | Age: 44
End: 2024-01-16

## 2024-01-16 ENCOUNTER — TRANSCRIBE ORDERS (OUTPATIENT)
Dept: ADMINISTRATIVE | Facility: HOSPITAL | Age: 44
End: 2024-01-16
Payer: COMMERCIAL

## 2024-01-16 DIAGNOSIS — K85.90 ACUTE PANCREATITIS WITHOUT NECROSIS OR INFECTION, UNSPECIFIE: ICD-10-CM

## 2024-01-16 DIAGNOSIS — K85.20 ALCOHOL-INDUCED ACUTE PANCREATITIS WITHOUT INFECTION OR NECROSIS: Primary | ICD-10-CM

## 2024-01-16 DIAGNOSIS — K86.3 PSEUDOCYST OF PANCREAS: ICD-10-CM

## 2024-01-16 PROCEDURE — 99232 SBSQ HOSP IP/OBS MODERATE 35: CPT | Performed by: INTERNAL MEDICINE

## 2024-01-17 ENCOUNTER — INPATIENT HOSPITAL (OUTPATIENT)
Dept: URBAN - METROPOLITAN AREA HOSPITAL 76 | Facility: HOSPITAL | Age: 44
End: 2024-01-17

## 2024-01-17 DIAGNOSIS — R10.9 UNSPECIFIED ABDOMINAL PAIN: ICD-10-CM

## 2024-01-17 DIAGNOSIS — R11.2 NAUSEA WITH VOMITING, UNSPECIFIED: ICD-10-CM

## 2024-01-17 DIAGNOSIS — K86.3 PSEUDOCYST OF PANCREAS: ICD-10-CM

## 2024-01-17 DIAGNOSIS — K85.90 ACUTE PANCREATITIS WITHOUT NECROSIS OR INFECTION, UNSPECIFIE: ICD-10-CM

## 2024-01-17 PROCEDURE — 99232 SBSQ HOSP IP/OBS MODERATE 35: CPT

## 2024-03-01 ENCOUNTER — HOSPITAL ENCOUNTER (OUTPATIENT)
Dept: CT IMAGING | Facility: HOSPITAL | Age: 44
Discharge: HOME | End: 2024-03-01
Admitting: INTERNAL MEDICINE
Payer: COMMERCIAL

## 2024-03-01 DIAGNOSIS — K86.3 PSEUDOCYST OF PANCREAS: ICD-10-CM

## 2024-03-01 DIAGNOSIS — K85.20 ALCOHOL-INDUCED ACUTE PANCREATITIS WITHOUT INFECTION OR NECROSIS: ICD-10-CM

## 2024-03-01 PROCEDURE — 25510000001 IOPAMIDOL PER 1 ML: Performed by: INTERNAL MEDICINE

## 2024-03-01 PROCEDURE — 74177 CT ABD & PELVIS W/CONTRAST: CPT

## 2024-03-01 RX ADMIN — IOPAMIDOL 100 ML: 755 INJECTION, SOLUTION INTRAVENOUS at 13:55

## 2024-03-11 ENCOUNTER — ANESTHESIA EVENT (OUTPATIENT)
Dept: GASTROENTEROLOGY | Facility: HOSPITAL | Age: 44
End: 2024-03-11
Payer: COMMERCIAL

## 2024-03-11 ENCOUNTER — ON CAMPUS - OUTPATIENT (OUTPATIENT)
Dept: URBAN - METROPOLITAN AREA HOSPITAL 85 | Facility: HOSPITAL | Age: 44
End: 2024-03-11

## 2024-03-11 ENCOUNTER — ANESTHESIA (OUTPATIENT)
Dept: GASTROENTEROLOGY | Facility: HOSPITAL | Age: 44
End: 2024-03-11
Payer: COMMERCIAL

## 2024-03-11 ENCOUNTER — HOSPITAL ENCOUNTER (OUTPATIENT)
Facility: HOSPITAL | Age: 44
Setting detail: HOSPITAL OUTPATIENT SURGERY
Discharge: HOME | End: 2024-03-11
Attending: INTERNAL MEDICINE | Admitting: INTERNAL MEDICINE
Payer: COMMERCIAL

## 2024-03-11 VITALS
RESPIRATION RATE: 16 BRPM | DIASTOLIC BLOOD PRESSURE: 60 MMHG | WEIGHT: 130.29 LBS | TEMPERATURE: 98.6 F | BODY MASS INDEX: 17.65 KG/M2 | HEART RATE: 64 BPM | OXYGEN SATURATION: 100 % | HEIGHT: 72 IN | SYSTOLIC BLOOD PRESSURE: 92 MMHG

## 2024-03-11 DIAGNOSIS — R93.2 ABNORMAL FINDINGS ON DIAGNOSTIC IMAGING OF LIVER AND BILIARY: ICD-10-CM

## 2024-03-11 DIAGNOSIS — K85.20 ALCOHOL INDUCED ACUTE PANCREATITIS WITHOUT NECROSIS OR INFEC: ICD-10-CM

## 2024-03-11 LAB
DEPRECATED RDW RBC AUTO: 41.7 FL (ref 37–54)
ERYTHROCYTE [DISTWIDTH] IN BLOOD BY AUTOMATED COUNT: 14.6 % (ref 12.3–15.4)
HCT VFR BLD AUTO: 49.3 % (ref 37.5–51)
HGB BLD-MCNC: 15.5 G/DL (ref 13–17.7)
MCH RBC QN AUTO: 25 PG (ref 26.6–33)
MCHC RBC AUTO-ENTMCNC: 31.4 G/DL (ref 31.5–35.7)
MCV RBC AUTO: 79.4 FL (ref 79–97)
PLATELET # BLD AUTO: 286 10*3/MM3 (ref 140–450)
PMV BLD AUTO: 10.9 FL (ref 6–12)
RBC # BLD AUTO: 6.21 10*6/MM3 (ref 4.14–5.8)
WBC NRBC COR # BLD AUTO: 5.65 10*3/MM3 (ref 3.4–10.8)

## 2024-03-11 PROCEDURE — 43247 EGD REMOVE FOREIGN BODY: CPT | Performed by: INTERNAL MEDICINE

## 2024-03-11 PROCEDURE — 25010000002 PROPOFOL 10 MG/ML EMULSION: Performed by: ANESTHESIOLOGIST ASSISTANT

## 2024-03-11 PROCEDURE — 25810000003 SODIUM CHLORIDE 0.9 % SOLUTION: Performed by: INTERNAL MEDICINE

## 2024-03-11 PROCEDURE — 25010000002 FENTANYL CITRATE (PF) 100 MCG/2ML SOLUTION: Performed by: ANESTHESIOLOGIST ASSISTANT

## 2024-03-11 PROCEDURE — 25010000002 ONDANSETRON PER 1 MG: Performed by: ANESTHESIOLOGIST ASSISTANT

## 2024-03-11 PROCEDURE — 85027 COMPLETE CBC AUTOMATED: CPT | Performed by: INTERNAL MEDICINE

## 2024-03-11 RX ORDER — PANTOPRAZOLE SODIUM 40 MG/10ML
INJECTION, POWDER, LYOPHILIZED, FOR SOLUTION INTRAVENOUS
Status: COMPLETED
Start: 2024-03-11 | End: 2024-03-11

## 2024-03-11 RX ORDER — SODIUM CHLORIDE 9 MG/ML
50 INJECTION, SOLUTION INTRAVENOUS CONTINUOUS
Status: DISCONTINUED | OUTPATIENT
Start: 2024-03-11 | End: 2024-03-11 | Stop reason: HOSPADM

## 2024-03-11 RX ORDER — PANTOPRAZOLE SODIUM 40 MG/10ML
INJECTION, POWDER, LYOPHILIZED, FOR SOLUTION INTRAVENOUS AS NEEDED
Status: DISCONTINUED | OUTPATIENT
Start: 2024-03-11 | End: 2024-03-11 | Stop reason: SURG

## 2024-03-11 RX ORDER — ONDANSETRON 2 MG/ML
INJECTION INTRAMUSCULAR; INTRAVENOUS AS NEEDED
Status: DISCONTINUED | OUTPATIENT
Start: 2024-03-11 | End: 2024-03-11 | Stop reason: SURG

## 2024-03-11 RX ORDER — PROPOFOL 10 MG/ML
VIAL (ML) INTRAVENOUS AS NEEDED
Status: DISCONTINUED | OUTPATIENT
Start: 2024-03-11 | End: 2024-03-11 | Stop reason: SURG

## 2024-03-11 RX ORDER — FENTANYL CITRATE 50 UG/ML
INJECTION, SOLUTION INTRAMUSCULAR; INTRAVENOUS AS NEEDED
Status: DISCONTINUED | OUTPATIENT
Start: 2024-03-11 | End: 2024-03-11 | Stop reason: SURG

## 2024-03-11 RX ORDER — LIDOCAINE HYDROCHLORIDE 10 MG/ML
INJECTION, SOLUTION EPIDURAL; INFILTRATION; INTRACAUDAL; PERINEURAL AS NEEDED
Status: DISCONTINUED | OUTPATIENT
Start: 2024-03-11 | End: 2024-03-11 | Stop reason: SURG

## 2024-03-11 RX ADMIN — SODIUM CHLORIDE 50 ML/HR: 9 INJECTION, SOLUTION INTRAVENOUS at 06:52

## 2024-03-11 RX ADMIN — PROPOFOL 300 MG: 10 INJECTION, EMULSION INTRAVENOUS at 08:59

## 2024-03-11 RX ADMIN — FENTANYL CITRATE 50 MCG: 50 INJECTION, SOLUTION INTRAMUSCULAR; INTRAVENOUS at 08:59

## 2024-03-11 RX ADMIN — PANTOPRAZOLE SODIUM 40 MG: 40 INJECTION, POWDER, FOR SOLUTION INTRAVENOUS at 09:11

## 2024-03-11 RX ADMIN — ONDANSETRON 4 MG: 2 INJECTION INTRAMUSCULAR; INTRAVENOUS at 09:09

## 2024-03-11 RX ADMIN — FENTANYL CITRATE 50 MCG: 50 INJECTION, SOLUTION INTRAMUSCULAR; INTRAVENOUS at 09:03

## 2024-03-11 RX ADMIN — LIDOCAINE HYDROCHLORIDE 50 MG: 10 INJECTION, SOLUTION EPIDURAL; INFILTRATION; INTRACAUDAL; PERINEURAL at 08:59

## 2024-03-11 NOTE — OP NOTE
ENDOSCOPIC RETROGRADE CHOLANGIOPANCREATOGRAPHY Procedure Report    Patient Name:  Dayron Manley  YOB: 1980    Date of Surgery:  3/11/2024     Pre-Op Diagnosis:  Alcohol induced acute pancreatitis without necrosis or infection [K85.20]       43 years old male who has a large pancreatic pseudocyst status post of Axios stent placement with a drainage abscess cyst CT scan have shown resolution of the cyst doing much better here for stent removal  Procedure/CPT® Codes:      Procedure(s):  esopahgogastroduodenoscopy, axios stent removal    Staff:  Surgeon(s):  Luan Mendoza MD      Anesthesia: Monitored Anesthesia Care    Description of Procedure:  A description of the procedure as well as risks, benefits and alternative methods were explained to the patient who voiced understanding and signed the corresponding consent form. A physical exam was performed and vital signs were monitored throughout the procedure.    An upper GI endoscope was placed into the mouth and proceeded through the esophagus, stomach and second portion of the duodenum without difficulty. The scope was then retroflexed and the fundus was visualized. The procedure was not difficult and there were no immediate complications.    Findings  Esophageal mucosa looks normal gastric mucosa looks normal stent was seen in the body of the stomach area antral duodenal mucosa looks normal.  Stent was grasped with the snare and removed using a traction opposite to the gastric wall and was retrieved in intact position.  No bleeding was noticed at the cavity or around the stent area.  Patient tolerated procedure very well no major complication was noticed.    Impression:  1.  Status post of Axios stent removal without any bleeding.  Minimal oozing was noticed.  2.  Normal gastric duodenal mucosa.    Recommendations:  Patient was advised to completely abstain from drinking alcohol.  He will be placed on a PPI for short-term.  Continue to monitor  liver function test.  Follow the GI clinic as scheduled        Luan Mendoza MD     Date: 3/11/2024    Time: 09:11 EDT

## 2024-03-11 NOTE — DISCHARGE INSTRUCTIONS
A responsible adult should stay with you and you should rest quietly for the rest of the day.    Do not drink alcohol, drive, operate any heavy machinery or power tools or make any legal/important decisions for the next 24 hours.     Progress your diet as tolerated.  If you begin to experience severe pain, increased shortness of breath, racing heartbeat or a fever above 101 F, seek immediate medical attention.     Follow up with MD as instructed. Call office for results in 3 to 5 days if needed.    [phone]    Impression:  1.  Status post of Axios stent removal without any bleeding.  Minimal oozing was noticed.  2.  Normal gastric duodenal mucosa.     Recommendations:  Patient was advised to completely abstain from drinking alcohol.  He will be placed on a PPI for short-term.  Continue to monitor liver function test.  Follow the GI clinic as scheduled

## 2024-03-11 NOTE — H&P
Patient Care Team:  Nancy El APRN as PCP - General (Family Medicine)      Subjective .     History of present illness:    Dayron Manley is a 43 y.o. male who presents today for Procedure(s):  esopahgogastroduodenoscopy, axios stent removal for the indications listed below.     The updated Patient Profile was reviewed prior to the procedure, in conjunction with the Physical Exam, including medical conditions, surgical procedures, medications, allergies, family history and social history.     Pre-operatively, I reviewed the indication(s) for the procedure, the risks of the procedure [including but not limited to: unexpected bleeding possibly requiring hospitalization and/or unplanned repeat procedures, perforation possibly requiring surgical treatment, missed lesions and complications of sedation/MAC (also explained by anesthesia staff)].     I have evaluated the patient for risks associated with the planned anesthesia and the procedure to be performed and find the patient an acceptable candidate for IV sedation.    Multiple opportunities were provided for any questions or concerns, and all questions were answered satisfactorily before any anesthesia was administered. We will proceed with the planned procedure.      ASSESSMENT/PLAN:  EGD  43 years old male with a history of Axios stent here for EGD stent removal      Past Medical History:  Past Medical History:   Diagnosis Date    Pancreatitis        Past Surgical History:  Past Surgical History:   Procedure Laterality Date    ENDOSCOPY N/A 12/8/2023    Procedure: ESOPHAGOGASTRODUODENOSCOPY with NJ tube placement;  Surgeon: Luan Mendoza MD;  Location: [facility] ENDOSCOPY;  Service: Gastroenterology;  Laterality: N/A;    ENDOSCOPY WITH JTUBE N/A 12/16/2023    Procedure: ESOPHAGOGASTRODUODENOSCOPY WITH JEJUNAL TUBE INSERTION;  Surgeon: Luan Mendoza MD;  Location: [facility] ENDOSCOPY;  Service: Gastroenterology;  Laterality: N/A;    JEJUNOSTOMY TUBE INSERTION  N/A 12/20/2023    Procedure: NASAL JEJUNAL FEEDING TUBE PLACEMENT;  Surgeon: Luan Mendoza MD;  Location: [facility] ENDOSCOPY;  Service: Gastroenterology;  Laterality: N/A;  Post- NJ tube placement       Social History:  Social History     Tobacco Use    Smoking status: Every Day     Current packs/day: 0.50     Average packs/day: 0.5 packs/day for 15.0 years (7.5 ttl pk-yrs)     Types: Cigarettes    Smokeless tobacco: Never   Vaping Use    Vaping status: Never Used   Substance Use Topics    Alcohol use: Not Currently    Drug use: Not Currently       Family History:  History reviewed. No pertinent family history.    Medications:  Medications Prior to Admission   Medication Sig Dispense Refill Last Dose    HYDROcodone-acetaminophen (NORCO) 5-325 MG per tablet Take 1 tablet by mouth four times a day as needed 40 tablet 0 Past Week    omeprazole (priLOSEC) 40 MG capsule Take 1 capsule by mouth Daily. (Patient taking differently: Take 1 capsule by mouth As Needed.) 90 capsule 4 Past Week    pantoprazole (PROTONIX) 40 MG EC tablet Take 1 tablet by mouth Every Morning. (Patient taking differently: Take 1 tablet by mouth As Needed.) 30 tablet 0 Past Week    polyethylene glycol (MIRALAX) 17 g packet Take 17 g by mouth Daily As Needed (Use if senna-docusate is ineffective). 30 each 0 Past Month    sennosides-docusate (PERICOLACE) 8.6-50 MG per tablet Take 2 tablets by mouth 2 (Two) Times a Day. (Patient taking differently: Take 2 tablets by mouth As Needed.) 30 tablet 0 Past Month       Scheduled Meds:   Continuous Infusions:sodium chloride, 50 mL/hr, Last Rate: 50 mL/hr (03/11/24 0652)      PRN Meds:.    ALLERGIES:  Patient has no known allergies.        Objective     Vital Signs:   Temp:  [98.6 °F (37 °C)] 98.6 °F (37 °C)  Heart Rate:  [79] 79  Resp:  [15] 15  BP: (112)/(82) 112/82    Physical Exam:      General Appearance:    Awake and alert, in no acute distress   Lungs:     Clear to auscultation bilaterally, respirations  regular, even and unlabored    Heart:    Regular rhythm and normal rate, normal S1 and S2, no            murmur, no gallop, no rub   Abdomen:     Normal bowel sounds, soft, non-tender, no rebound or guarding, non-distended, no hepatosplenomegaly        Results Review:   I reviewed the patient's labs and imaging.  Lab Results (last 24 hours)       Procedure Component Value Units Date/Time    CBC (No Diff) [[id number]] Collected: 03/11/24 0637    Specimen: Blood from Arm, Right Updated: 03/11/24 0642            Imaging Results (Last 24 Hours)       ** No results found for the last 24 hours. **               I discussed the patients findings and my recommendations with the patient.  Luan Mendoza MD  03/11/24  09:12 EDT

## 2024-03-11 NOTE — ANESTHESIA POSTPROCEDURE EVALUATION
Patient: Dayron Manley    Procedure Summary       Date: 03/11/24 Room / Location: [facility] ENDOSCOPY 2 / [facility] ENDOSCOPY    Anesthesia Start: 0853 Anesthesia Stop: 0913    Procedure: esopahgogastroduodenoscopy, axios stent removal Diagnosis:       Alcohol induced acute pancreatitis without necrosis or infection      (Alcohol induced acute pancreatitis without necrosis or infection [K85.20])    Surgeons: Luan Mendoza MD Provider: Tara Cardenas MD    Anesthesia Type: general ASA Status: 3            Anesthesia Type: general    Vitals  Vitals Value Taken Time   BP 92/60 03/11/24 0939   Temp     Pulse 69 03/11/24 0945   Resp 16 03/11/24 0939   SpO2 97 % 03/11/24 0945   Vitals shown include unfiled device data.        Post Anesthesia Care and Evaluation    Patient location during evaluation: PACU  Patient participation: complete - patient participated  Level of consciousness: awake and alert  Pain management: satisfactory to patient    Airway patency: patent  Anesthetic complications: No anesthetic complications  PONV Status: none  Cardiovascular status: acceptable  Respiratory status: acceptable  Hydration status: acceptable

## 2024-03-11 NOTE — ANESTHESIA PREPROCEDURE EVALUATION
Anesthesia Evaluation     Patient summary reviewed and Nursing notes reviewed   no history of anesthetic complications:   NPO Solid Status: > 8 hours  NPO Liquid Status: > 8 hours           Airway   Mallampati: I  TM distance: >3 FB  Neck ROM: full  No difficulty expected  Dental      Comment: Multiple missing upper teeth- no loose    Pulmonary - normal exam   (+) a smoker Current,  Cardiovascular - negative cardio ROS and normal exam        Neuro/Psych- negative ROS  GI/Hepatic/Renal/Endo - negative ROS     Musculoskeletal (-) negative ROS    Abdominal  - normal exam    Bowel sounds: normal.   Substance History - negative use     OB/GYN negative ob/gyn ROS         Other - negative ROS       ROS/Med Hx Other: PANCREATITIS  DURAGESIC PATCH              Anesthesia Plan    ASA 3     general   total IV anesthesia  intravenous induction     Anesthetic plan, risks, benefits, and alternatives have been provided, discussed and informed consent has been obtained with: patient.  Pre-procedure education provided  Plan discussed with CRNA and CAA.    CODE STATUS:

## (undated) DEVICE — SNAR POLYP HOTSNARE/BRAIDED OVL/MINI 7F 2.8X10MM 230CM 1P/U

## (undated) DEVICE — PK ENDO GI 50

## (undated) DEVICE — BITEBLOCK ENDO W/STRAP 60F A/ LF DISP

## (undated) DEVICE — NASO-JEJUNAL FEEDING TUBE: Brand: KANGAROO

## (undated) DEVICE — NASO-JEJUNAL FEEDING TUBE WITH ENFIT CONNECTION: Brand: COVIDIEN

## (undated) DEVICE — SNAR POLYP HOTSNARE/BRAIDED OVL/LG 7F 2.8X24MM 230CM 1P/U